# Patient Record
Sex: MALE | Race: WHITE | NOT HISPANIC OR LATINO | Employment: OTHER | RURAL
[De-identification: names, ages, dates, MRNs, and addresses within clinical notes are randomized per-mention and may not be internally consistent; named-entity substitution may affect disease eponyms.]

---

## 2021-02-09 LAB — GLUCOSE SERPL-MCNC: 98 MG/DL

## 2021-05-20 RX ORDER — ATENOLOL 25 MG/1
25 TABLET ORAL DAILY
COMMUNITY
End: 2021-07-07

## 2021-05-20 RX ORDER — TRAZODONE HYDROCHLORIDE 150 MG/1
150 TABLET ORAL NIGHTLY
COMMUNITY
End: 2021-08-10 | Stop reason: SDUPTHER

## 2021-05-20 RX ORDER — HYDROCODONE BITARTRATE AND ACETAMINOPHEN 10; 325 MG/1; MG/1
1 TABLET ORAL EVERY 6 HOURS
COMMUNITY
End: 2021-11-11 | Stop reason: SDUPTHER

## 2021-05-20 RX ORDER — LOSARTAN POTASSIUM AND HYDROCHLOROTHIAZIDE 12.5; 5 MG/1; MG/1
1 TABLET ORAL DAILY
COMMUNITY
End: 2021-08-09

## 2021-05-20 RX ORDER — ESCITALOPRAM OXALATE 20 MG/1
20 TABLET ORAL DAILY
COMMUNITY
End: 2021-07-07

## 2021-05-20 RX ORDER — CYCLOBENZAPRINE HCL 10 MG
10 TABLET ORAL EVERY 8 HOURS
COMMUNITY
End: 2021-07-07

## 2021-05-24 ENCOUNTER — OFFICE VISIT (OUTPATIENT)
Dept: PAIN MEDICINE | Facility: CLINIC | Age: 66
End: 2021-05-24
Payer: MEDICARE

## 2021-05-24 VITALS
BODY MASS INDEX: 34.27 KG/M2 | RESPIRATION RATE: 18 BRPM | SYSTOLIC BLOOD PRESSURE: 127 MMHG | WEIGHT: 267 LBS | HEIGHT: 74 IN | HEART RATE: 68 BPM | DIASTOLIC BLOOD PRESSURE: 78 MMHG

## 2021-05-24 DIAGNOSIS — R10.84 GENERALIZED ABDOMINAL PAIN: Primary | Chronic | ICD-10-CM

## 2021-05-24 DIAGNOSIS — Z79.899 ENCOUNTER FOR LONG-TERM (CURRENT) USE OF OTHER MEDICATIONS: ICD-10-CM

## 2021-05-24 DIAGNOSIS — M79.10 MYALGIA: Chronic | ICD-10-CM

## 2021-05-24 DIAGNOSIS — M47.812 CERVICAL SPONDYLOSIS WITHOUT MYELOPATHY: Chronic | ICD-10-CM

## 2021-05-24 LAB
CTP QC/QA: YES
POC (AMP) AMPHETAMINE: NEGATIVE
POC (BAR) BARBITURATES: NEGATIVE
POC (BUP) BUPRENORPHINE: NEGATIVE
POC (BZO) BENZODIAZEPINES: NEGATIVE
POC (COC) COCAINE: NEGATIVE
POC (MDMA) METHYLENEDIOXYMETHAMPHETAMINE 3,4: NEGATIVE
POC (MET) METHAMPHETAMINE: NEGATIVE
POC (MOP) OPIATES: ABNORMAL
POC (MTD) METHADONE: NEGATIVE
POC (OXY) OXYCODONE: NEGATIVE
POC (PCP) PHENCYCLIDINE: NEGATIVE
POC (TCA) TRICYCLIC ANTIDEPRESSANTS: NEGATIVE
POC TEMPERATURE (URINE): 92
POC THC: NEGATIVE

## 2021-05-24 PROCEDURE — 99214 OFFICE O/P EST MOD 30 MIN: CPT | Mod: S$PBB,,, | Performed by: PHYSICIAN ASSISTANT

## 2021-05-24 PROCEDURE — 99214 PR OFFICE/OUTPT VISIT, EST, LEVL IV, 30-39 MIN: ICD-10-PCS | Mod: S$PBB,,, | Performed by: PHYSICIAN ASSISTANT

## 2021-05-24 PROCEDURE — 80305 DRUG TEST PRSMV DIR OPT OBS: CPT | Mod: PBBFAC | Performed by: PHYSICIAN ASSISTANT

## 2021-05-24 PROCEDURE — 99214 OFFICE O/P EST MOD 30 MIN: CPT | Mod: PBBFAC | Performed by: PHYSICIAN ASSISTANT

## 2021-05-24 RX ORDER — HYDROCODONE BITARTRATE AND ACETAMINOPHEN 10; 325 MG/1; MG/1
1 TABLET ORAL EVERY 6 HOURS
Qty: 120 TABLET | Refills: 0 | Status: SHIPPED | OUTPATIENT
Start: 2021-07-27 | End: 2021-08-26

## 2021-05-24 RX ORDER — HYDROCODONE BITARTRATE AND ACETAMINOPHEN 10; 325 MG/1; MG/1
1 TABLET ORAL EVERY 6 HOURS
Qty: 120 TABLET | Refills: 0 | Status: SHIPPED | OUTPATIENT
Start: 2021-05-28 | End: 2021-06-27

## 2021-05-24 RX ORDER — HYDROCODONE BITARTRATE AND ACETAMINOPHEN 10; 325 MG/1; MG/1
1 TABLET ORAL EVERY 6 HOURS
Qty: 120 TABLET | Refills: 0 | Status: SHIPPED | OUTPATIENT
Start: 2021-06-25 | End: 2021-07-25

## 2021-08-10 ENCOUNTER — OFFICE VISIT (OUTPATIENT)
Dept: FAMILY MEDICINE | Facility: CLINIC | Age: 66
End: 2021-08-10
Payer: MEDICARE

## 2021-08-10 VITALS
OXYGEN SATURATION: 99 % | BODY MASS INDEX: 34.27 KG/M2 | HEIGHT: 74 IN | RESPIRATION RATE: 18 BRPM | WEIGHT: 267 LBS | DIASTOLIC BLOOD PRESSURE: 72 MMHG | HEART RATE: 88 BPM | TEMPERATURE: 98 F | SYSTOLIC BLOOD PRESSURE: 124 MMHG

## 2021-08-10 DIAGNOSIS — G47.00 INSOMNIA, UNSPECIFIED TYPE: ICD-10-CM

## 2021-08-10 DIAGNOSIS — M47.812 CERVICAL SPONDYLOSIS WITHOUT MYELOPATHY: ICD-10-CM

## 2021-08-10 DIAGNOSIS — I10 HYPERTENSION, UNSPECIFIED TYPE: Primary | ICD-10-CM

## 2021-08-10 DIAGNOSIS — R45.4 OUTBURSTS OF ANGER: ICD-10-CM

## 2021-08-10 DIAGNOSIS — E78.5 HYPERLIPIDEMIA, UNSPECIFIED HYPERLIPIDEMIA TYPE: ICD-10-CM

## 2021-08-10 PROCEDURE — 99213 OFFICE O/P EST LOW 20 MIN: CPT | Mod: ,,, | Performed by: NURSE PRACTITIONER

## 2021-08-10 PROCEDURE — 99213 PR OFFICE/OUTPT VISIT, EST, LEVL III, 20-29 MIN: ICD-10-PCS | Mod: ,,, | Performed by: NURSE PRACTITIONER

## 2021-08-10 RX ORDER — TRAZODONE HYDROCHLORIDE 150 MG/1
150 TABLET ORAL NIGHTLY
Qty: 90 TABLET | Refills: 1 | Status: SHIPPED | OUTPATIENT
Start: 2021-08-10 | End: 2022-02-10 | Stop reason: SDUPTHER

## 2021-08-10 RX ORDER — CYCLOBENZAPRINE HCL 10 MG
10 TABLET ORAL EVERY 8 HOURS PRN
Qty: 90 TABLET | Refills: 2 | Status: SHIPPED | OUTPATIENT
Start: 2021-08-10 | End: 2021-12-15

## 2021-08-10 RX ORDER — ATENOLOL 25 MG/1
25 TABLET ORAL DAILY
Qty: 90 TABLET | Refills: 1 | Status: SHIPPED | OUTPATIENT
Start: 2021-08-10 | End: 2022-02-10 | Stop reason: SDUPTHER

## 2021-08-10 RX ORDER — LOSARTAN POTASSIUM AND HYDROCHLOROTHIAZIDE 12.5; 5 MG/1; MG/1
1 TABLET ORAL DAILY
Qty: 90 TABLET | Refills: 1 | Status: SHIPPED | OUTPATIENT
Start: 2021-08-10 | End: 2022-02-10 | Stop reason: SDUPTHER

## 2021-08-10 RX ORDER — ESCITALOPRAM OXALATE 20 MG/1
TABLET ORAL
Qty: 90 TABLET | Refills: 1 | Status: SHIPPED | OUTPATIENT
Start: 2021-08-10 | End: 2022-02-10 | Stop reason: SDUPTHER

## 2021-08-10 RX ORDER — LOVASTATIN 40 MG/1
40 TABLET ORAL NIGHTLY
Qty: 90 TABLET | Refills: 1 | Status: SHIPPED | OUTPATIENT
Start: 2021-08-10 | End: 2022-02-10 | Stop reason: SDUPTHER

## 2021-08-25 ENCOUNTER — OFFICE VISIT (OUTPATIENT)
Dept: PAIN MEDICINE | Facility: CLINIC | Age: 66
End: 2021-08-25
Payer: MEDICARE

## 2021-08-25 VITALS
HEART RATE: 71 BPM | WEIGHT: 261.63 LBS | DIASTOLIC BLOOD PRESSURE: 80 MMHG | SYSTOLIC BLOOD PRESSURE: 118 MMHG | HEIGHT: 74 IN | BODY MASS INDEX: 33.58 KG/M2

## 2021-08-25 DIAGNOSIS — Z79.899 ENCOUNTER FOR LONG-TERM (CURRENT) USE OF OTHER MEDICATIONS: Primary | Chronic | ICD-10-CM

## 2021-08-25 DIAGNOSIS — R10.84 GENERALIZED ABDOMINAL PAIN: Chronic | ICD-10-CM

## 2021-08-25 LAB
CTP QC/QA: YES
POC (AMP) AMPHETAMINE: NEGATIVE
POC (BAR) BARBITURATES: NEGATIVE
POC (BUP) BUPRENORPHINE: NEGATIVE
POC (BZO) BENZODIAZEPINES: NEGATIVE
POC (COC) COCAINE: NEGATIVE
POC (MDMA) METHYLENEDIOXYMETHAMPHETAMINE 3,4: NEGATIVE
POC (MET) METHAMPHETAMINE: NEGATIVE
POC (MOP) OPIATES: ABNORMAL
POC (MTD) METHADONE: NEGATIVE
POC (OXY) OXYCODONE: NEGATIVE
POC (PCP) PHENCYCLIDINE: NEGATIVE
POC (TCA) TRICYCLIC ANTIDEPRESSANTS: NEGATIVE
POC TEMPERATURE (URINE): 94
POC THC: NEGATIVE

## 2021-08-25 PROCEDURE — 99214 PR OFFICE/OUTPT VISIT, EST, LEVL IV, 30-39 MIN: ICD-10-PCS | Mod: S$PBB,,, | Performed by: PAIN MEDICINE

## 2021-08-25 PROCEDURE — 99214 OFFICE O/P EST MOD 30 MIN: CPT | Mod: PBBFAC | Performed by: PAIN MEDICINE

## 2021-08-25 PROCEDURE — 99214 OFFICE O/P EST MOD 30 MIN: CPT | Mod: S$PBB,,, | Performed by: PAIN MEDICINE

## 2021-08-25 PROCEDURE — 80305 DRUG TEST PRSMV DIR OPT OBS: CPT | Mod: PBBFAC | Performed by: PAIN MEDICINE

## 2021-08-25 RX ORDER — HYDROCODONE BITARTRATE AND ACETAMINOPHEN 10; 325 MG/1; MG/1
1 TABLET ORAL EVERY 6 HOURS PRN
Qty: 120 TABLET | Refills: 0 | Status: SHIPPED | OUTPATIENT
Start: 2021-08-26 | End: 2021-09-25

## 2021-08-25 RX ORDER — HYDROCODONE BITARTRATE AND ACETAMINOPHEN 10; 325 MG/1; MG/1
1 TABLET ORAL EVERY 6 HOURS PRN
Qty: 120 TABLET | Refills: 0 | Status: SHIPPED | OUTPATIENT
Start: 2021-09-24 | End: 2021-09-20 | Stop reason: SDUPTHER

## 2021-08-25 RX ORDER — HYDROCODONE BITARTRATE AND ACETAMINOPHEN 10; 325 MG/1; MG/1
1 TABLET ORAL EVERY 6 HOURS PRN
Qty: 120 TABLET | Refills: 0 | Status: SHIPPED | OUTPATIENT
Start: 2021-10-25 | End: 2021-09-20 | Stop reason: SDUPTHER

## 2021-09-20 ENCOUNTER — OFFICE VISIT (OUTPATIENT)
Dept: FAMILY MEDICINE | Facility: CLINIC | Age: 66
End: 2021-09-20
Payer: MEDICARE

## 2021-09-20 VITALS
WEIGHT: 262 LBS | SYSTOLIC BLOOD PRESSURE: 124 MMHG | DIASTOLIC BLOOD PRESSURE: 86 MMHG | BODY MASS INDEX: 33.62 KG/M2 | OXYGEN SATURATION: 99 % | HEART RATE: 74 BPM | RESPIRATION RATE: 18 BRPM | TEMPERATURE: 98 F | HEIGHT: 74 IN

## 2021-09-20 DIAGNOSIS — Z00.00 ENCOUNTER FOR SUBSEQUENT ANNUAL WELLNESS VISIT (AWV) IN MEDICARE PATIENT: Primary | ICD-10-CM

## 2021-09-20 DIAGNOSIS — I10 HYPERTENSION, UNSPECIFIED TYPE: ICD-10-CM

## 2021-09-20 DIAGNOSIS — E78.5 HYPERLIPIDEMIA, UNSPECIFIED HYPERLIPIDEMIA TYPE: ICD-10-CM

## 2021-09-20 DIAGNOSIS — Z12.5 ENCOUNTER FOR SCREENING FOR MALIGNANT NEOPLASM OF PROSTATE: ICD-10-CM

## 2021-09-20 PROCEDURE — 80061 LIPID PANEL: CPT | Mod: ,,, | Performed by: CLINICAL MEDICAL LABORATORY

## 2021-09-20 PROCEDURE — G0439 PR MEDICARE ANNUAL WELLNESS SUBSEQUENT VISIT: ICD-10-PCS | Mod: ,,, | Performed by: NURSE PRACTITIONER

## 2021-09-20 PROCEDURE — 80061 LIPID PANEL: ICD-10-PCS | Mod: ,,, | Performed by: CLINICAL MEDICAL LABORATORY

## 2021-09-20 PROCEDURE — G0103 PSA SCREENING: HCPCS | Mod: ,,, | Performed by: CLINICAL MEDICAL LABORATORY

## 2021-09-20 PROCEDURE — G0439 PPPS, SUBSEQ VISIT: HCPCS | Mod: ,,, | Performed by: NURSE PRACTITIONER

## 2021-09-20 PROCEDURE — G0103 PSA, SCREENING: ICD-10-PCS | Mod: ,,, | Performed by: CLINICAL MEDICAL LABORATORY

## 2021-09-21 LAB
CHOLEST SERPL-MCNC: 160 MG/DL (ref 0–200)
CHOLEST/HDLC SERPL: 3 {RATIO}
HDLC SERPL-MCNC: 53 MG/DL (ref 40–60)
LDLC SERPL CALC-MCNC: 87 MG/DL
LDLC/HDLC SERPL: 1.6 {RATIO}
NONHDLC SERPL-MCNC: 107 MG/DL
PSA SERPL-MCNC: 0.35 NG/ML (ref 0–4.1)
TRIGL SERPL-MCNC: 101 MG/DL (ref 35–150)
VLDLC SERPL-MCNC: 20 MG/DL

## 2021-11-15 ENCOUNTER — OFFICE VISIT (OUTPATIENT)
Dept: PAIN MEDICINE | Facility: CLINIC | Age: 66
End: 2021-11-15
Payer: MEDICARE

## 2021-11-15 VITALS
WEIGHT: 257 LBS | HEART RATE: 66 BPM | SYSTOLIC BLOOD PRESSURE: 125 MMHG | RESPIRATION RATE: 18 BRPM | HEIGHT: 74 IN | BODY MASS INDEX: 32.98 KG/M2 | DIASTOLIC BLOOD PRESSURE: 71 MMHG

## 2021-11-15 DIAGNOSIS — R10.84 GENERALIZED ABDOMINAL PAIN: Primary | Chronic | ICD-10-CM

## 2021-11-15 DIAGNOSIS — Z79.899 ENCOUNTER FOR LONG-TERM (CURRENT) USE OF OTHER MEDICATIONS: ICD-10-CM

## 2021-11-15 DIAGNOSIS — M47.812 CERVICAL SPONDYLOSIS WITHOUT MYELOPATHY: Chronic | ICD-10-CM

## 2021-11-15 PROCEDURE — 99214 PR OFFICE/OUTPT VISIT, EST, LEVL IV, 30-39 MIN: ICD-10-PCS | Mod: S$PBB,,, | Performed by: PHYSICIAN ASSISTANT

## 2021-11-15 PROCEDURE — 99214 OFFICE O/P EST MOD 30 MIN: CPT | Mod: PBBFAC | Performed by: PHYSICIAN ASSISTANT

## 2021-11-15 PROCEDURE — 99214 OFFICE O/P EST MOD 30 MIN: CPT | Mod: S$PBB,,, | Performed by: PHYSICIAN ASSISTANT

## 2021-11-15 PROCEDURE — 80305 DRUG TEST PRSMV DIR OPT OBS: CPT | Mod: PBBFAC | Performed by: PHYSICIAN ASSISTANT

## 2021-11-15 RX ORDER — HYDROCODONE BITARTRATE AND ACETAMINOPHEN 10; 325 MG/1; MG/1
1 TABLET ORAL EVERY 6 HOURS
Qty: 120 TABLET | Refills: 0 | Status: SHIPPED | OUTPATIENT
Start: 2022-01-21 | End: 2022-02-15 | Stop reason: SDUPTHER

## 2021-11-15 RX ORDER — HYDROCODONE BITARTRATE AND ACETAMINOPHEN 10; 325 MG/1; MG/1
1 TABLET ORAL EVERY 6 HOURS
Qty: 120 TABLET | Refills: 0 | Status: SHIPPED | OUTPATIENT
Start: 2021-11-24 | End: 2021-12-24

## 2021-11-15 RX ORDER — HYDROCODONE BITARTRATE AND ACETAMINOPHEN 10; 325 MG/1; MG/1
1 TABLET ORAL EVERY 6 HOURS
Qty: 120 TABLET | Refills: 0 | Status: SHIPPED | OUTPATIENT
Start: 2021-12-23 | End: 2022-01-22

## 2021-12-15 DIAGNOSIS — M47.812 CERVICAL SPONDYLOSIS WITHOUT MYELOPATHY: ICD-10-CM

## 2021-12-15 RX ORDER — CYCLOBENZAPRINE HCL 10 MG
TABLET ORAL
Qty: 90 TABLET | Refills: 2 | Status: SHIPPED | OUTPATIENT
Start: 2021-12-15 | End: 2022-02-10 | Stop reason: SDUPTHER

## 2022-02-09 NOTE — PROGRESS NOTES
2/10/2022     SOFIA Fitzpatrick   [facility]  [address]  [place] [address]     PATIENT NAME: Simon Voss  : 1955  DATE: 2/10/22  MRN: [id number]      Billing Provider: SOFIA Fitzpatrick  Level of Service:   Patient PCP Information     Provider PCP Type    SOFIA Fitzpatrick General          Reason for Visit / Chief Complaint: Hypertension (6 month follow up, labs, refills) and Hyperlipidemia       Update PCP  Update Chief Complaint         History of Present Illness / Problem Focused Workflow     Simon Voss presents to the clinic 6 month follow up hypertension hyperlipidemia anxiety/depression    Review of Systems     Review of Systems   Constitutional: Negative for appetite change, fatigue and fever.   Eyes: Negative for visual disturbance.   Respiratory: Negative for cough, chest tightness, shortness of breath and wheezing.    Cardiovascular: Negative for chest pain, palpitations, leg swelling and claudication.   Gastrointestinal: Positive for abdominal pain. Negative for change in bowel habit, nausea, vomiting and change in bowel habit.   Genitourinary: Negative for difficulty urinating and frequency.   Neurological: Negative for weakness and headaches.        Medical / Social / Family History     Past Medical History:   Diagnosis Date    Anxiety     Cervical spondylosis without myelopathy     Chronic pain syndrome     Depression     Generalized abdominal pain     High cholesterol     Hypertension        Past Surgical History:   Procedure Laterality Date    APPENDECTOMY         Social History    reports that he has quit smoking. His smokeless tobacco use includes snuff and chew. He reports previous alcohol use. He reports that he does not use drugs.    Family History  's family history includes Cancer in his mother; Diabetes in his maternal grandmother.    Medications and Allergies     Medications  Outpatient Medications  "Marked as Taking for the 2/10/22 encounter (Office Visit) with SOFIA Fitzpatrick   Medication Sig Dispense Refill    HYDROcodone-acetaminophen (NORCO)  mg per tablet Take 1 tablet by mouth every 6 (six) hours. 120 tablet 0    [DISCONTINUED] atenoloL (TENORMIN) 25 MG tablet Take 1 tablet (25 mg total) by mouth once daily. 90 tablet 1    [DISCONTINUED] cyclobenzaprine (FLEXERIL) 10 MG tablet TAKE ONE TABLET BY MOUTH EVERY 8 HOURS AS NEEDED 90 tablet 2    [DISCONTINUED] EScitalopram oxalate (LEXAPRO) 20 MG tablet Take 1.5 tablets by mouth daily x6 weeks, then decrease back to 1 tablet daily 90 tablet 1    [DISCONTINUED] losartan-hydrochlorothiazide 50-12.5 mg (HYZAAR) 50-12.5 mg per tablet Take 1 tablet by mouth once daily. 90 tablet 1    [DISCONTINUED] lovastatin (MEVACOR) 40 MG tablet Take 1 tablet (40 mg total) by mouth nightly. 90 tablet 1    [DISCONTINUED] traZODone (DESYREL) 150 MG tablet Take 1 tablet (150 mg total) by mouth every evening. 90 tablet 1       Allergies  Review of patient's allergies indicates:  No Known Allergies    Physical Examination     Vitals:    02/10/22 0817   BP: 130/80   BP Location: Right arm   Patient Position: Sitting   BP Method: Medium (Manual)   Pulse: 90   Resp: 18   Temp: 98.8 °F (37.1 °C)   TempSrc: Oral   SpO2: 99%   Weight: 117.5 kg (259 lb)   Height: 6' 2" (1.88 m)      Physical Exam  Vitals and nursing note reviewed.   Constitutional:       Appearance: Normal appearance. He is obese.   HENT:      Right Ear: Tympanic membrane, ear canal and external ear normal.      Left Ear: Tympanic membrane, ear canal and external ear normal.      Nose: Nose normal.      Mouth/Throat:      Mouth: Mucous membranes are moist.   Eyes:      Extraocular Movements: Extraocular movements intact.      Conjunctiva/sclera: Conjunctivae normal.      Pupils: Pupils are equal, round, and reactive to light.   Cardiovascular:      Rate and Rhythm: Normal rate and regular rhythm. "      Pulses: Normal pulses.      Heart sounds: Normal heart sounds.   Pulmonary:      Effort: Pulmonary effort is normal.      Breath sounds: Normal breath sounds.   Musculoskeletal:         General: Normal range of motion.      Cervical back: Normal range of motion.   Skin:     General: Skin is warm and dry.   Neurological:      General: No focal deficit present.      Mental Status: He is alert and oriented to person, place, and time.   Psychiatric:         Mood and Affect: Mood normal.         Behavior: Behavior normal.          CMP  Glucose   Date Value Ref Range Status   02/09/2021 98 mg/dL Final     Lab Results   Component Value Date    CHOL 160 09/20/2021     Lab Results   Component Value Date    HDL 53 09/20/2021     Lab Results   Component Value Date    LDLCALC 87 09/20/2021     Lab Results   Component Value Date    TRIG 101 09/20/2021     Lab Results   Component Value Date    CHOLHDL 3.0 09/20/2021     Lab Results   Component Value Date    PSA 0.352 09/20/2021       Assessment and Plan (including Health Maintenance)      Problem List  Smart Sets  Document Outside HM   :    Plan:   - work on diet, specifically cutting back bread, breaded things, cereal, pasta, potatoes, rice  - try to exercise at least 150min a week divided however you want  - if you can do weight, even very light weight do them  - try not to use to much salt, if any, remember that things that are preserved or frozen often contain large amounts of salt as a preserve      Health Maintenance Due   Topic Date Due    Hepatitis C Screening  Never done    Sign Pain Contract  Never done    Abdominal Aortic Aneurysm Screening  Never done    Influenza Vaccine (1) Never done    Pneumococcal Vaccines (Age 65+) (2 of 2 - PPSV23) 11/10/2021       Problem List Items Addressed This Visit        Neuro    Cervical spondylosis without myelopathy (Chronic)    Relevant Medications    cyclobenzaprine (FLEXERIL) 10 MG tablet      Other Visit Diagnoses      Hypertension, unspecified type    -  Primary    Relevant Medications    atenoloL (TENORMIN) 25 MG tablet    losartan-hydrochlorothiazide 50-12.5 mg (HYZAAR) 50-12.5 mg per tablet    Other Relevant Orders    CBC Auto Differential    Comprehensive Metabolic Panel    Hyperlipidemia, unspecified hyperlipidemia type        Relevant Medications    lovastatin (MEVACOR) 40 MG tablet    Other Relevant Orders    Lipid Panel    CK    Outbursts of anger        Relevant Medications    EScitalopram oxalate (LEXAPRO) 20 MG tablet    Insomnia, unspecified type        Relevant Medications    traZODone (DESYREL) 150 MG tablet        Hypertension, unspecified type  -     CBC Auto Differential; Future; Expected date: 02/10/2022  -     Comprehensive Metabolic Panel; Future; Expected date: 02/10/2022  -     atenoloL (TENORMIN) 25 MG tablet; Take 1 tablet (25 mg total) by mouth once daily.  Dispense: 90 tablet; Refill: 1  -     losartan-hydrochlorothiazide 50-12.5 mg (HYZAAR) 50-12.5 mg per tablet; Take 1 tablet by mouth once daily.  Dispense: 90 tablet; Refill: 1    Hyperlipidemia, unspecified hyperlipidemia type  -     Lipid Panel; Future; Expected date: 02/10/2022  -     CK; Future; Expected date: 02/10/2022  -     lovastatin (MEVACOR) 40 MG tablet; Take 1 tablet (40 mg total) by mouth nightly.  Dispense: 90 tablet; Refill: 1    Cervical spondylosis without myelopathy  -     cyclobenzaprine (FLEXERIL) 10 MG tablet; Take 1 tablet (10 mg total) by mouth every 8 (eight) hours as needed.  Dispense: 90 tablet; Refill: 0    Outbursts of anger  -     EScitalopram oxalate (LEXAPRO) 20 MG tablet; Take 1.5 tablets by mouth daily x6 weeks, then decrease back to 1 tablet daily  Dispense: 90 tablet; Refill: 1    Insomnia, unspecified type  -     traZODone (DESYREL) 150 MG tablet; Take 1 tablet (150 mg total) by mouth every evening.  Dispense: 90 tablet; Refill: 1       Health Maintenance Topics with due status: Not Due       Topic Last Completion  Date    TETANUS VACCINE 11/10/2020    PROSTATE-SPECIFIC ANTIGEN 09/20/2021    Lipid Panel 09/20/2021       Procedures     Future Appointments   Date Time Provider Department Center   2/17/2022 10:15 AM NANDO Wagner [facility]   8/10/2022  8:00 AM SOFIA Fitz[place] [facility] [place]   9/21/2022  8:30 AM AWV NURSE [facility]        Follow up in about 6 months (around 8/10/2022), or if symptoms worsen or fail to improve.     Signature:  SOFIA Fitzpatrick    Date of encounter: 2/10/22

## 2022-02-10 ENCOUNTER — OFFICE VISIT (OUTPATIENT)
Dept: FAMILY MEDICINE | Facility: CLINIC | Age: 67
End: 2022-02-10
Payer: MEDICARE

## 2022-02-10 VITALS
HEIGHT: 74 IN | DIASTOLIC BLOOD PRESSURE: 80 MMHG | HEART RATE: 90 BPM | RESPIRATION RATE: 18 BRPM | TEMPERATURE: 99 F | OXYGEN SATURATION: 99 % | BODY MASS INDEX: 33.24 KG/M2 | WEIGHT: 259 LBS | SYSTOLIC BLOOD PRESSURE: 130 MMHG

## 2022-02-10 DIAGNOSIS — R73.01 ELEVATED FASTING GLUCOSE: ICD-10-CM

## 2022-02-10 DIAGNOSIS — G47.00 INSOMNIA, UNSPECIFIED TYPE: ICD-10-CM

## 2022-02-10 DIAGNOSIS — M47.812 CERVICAL SPONDYLOSIS WITHOUT MYELOPATHY: ICD-10-CM

## 2022-02-10 DIAGNOSIS — E78.5 HYPERLIPIDEMIA, UNSPECIFIED HYPERLIPIDEMIA TYPE: ICD-10-CM

## 2022-02-10 DIAGNOSIS — R45.4 OUTBURSTS OF ANGER: ICD-10-CM

## 2022-02-10 DIAGNOSIS — I10 HYPERTENSION, UNSPECIFIED TYPE: Primary | ICD-10-CM

## 2022-02-10 LAB
ALBUMIN SERPL BCP-MCNC: 3.8 G/DL (ref 3.5–5)
ALBUMIN/GLOB SERPL: 1.2 {RATIO}
ALP SERPL-CCNC: 46 U/L (ref 45–115)
ALT SERPL W P-5'-P-CCNC: 30 U/L (ref 16–61)
ANION GAP SERPL CALCULATED.3IONS-SCNC: 8 MMOL/L (ref 7–16)
AST SERPL W P-5'-P-CCNC: 19 U/L (ref 15–37)
BASOPHILS # BLD AUTO: 0.11 K/UL (ref 0–0.2)
BASOPHILS NFR BLD AUTO: 1.2 % (ref 0–1)
BILIRUB SERPL-MCNC: 0.5 MG/DL (ref 0–1.2)
BUN SERPL-MCNC: 20 MG/DL (ref 7–18)
BUN/CREAT SERPL: 14 (ref 6–20)
CALCIUM SERPL-MCNC: 8.7 MG/DL (ref 8.5–10.1)
CHLORIDE SERPL-SCNC: 104 MMOL/L (ref 98–107)
CHOLEST SERPL-MCNC: 156 MG/DL (ref 0–200)
CHOLEST/HDLC SERPL: 3.2 {RATIO}
CK SERPL-CCNC: 248 U/L (ref 39–308)
CO2 SERPL-SCNC: 29 MMOL/L (ref 21–32)
CREAT SERPL-MCNC: 1.4 MG/DL (ref 0.7–1.3)
DIFFERENTIAL METHOD BLD: ABNORMAL
EOSINOPHIL # BLD AUTO: 0.4 K/UL (ref 0–0.5)
EOSINOPHIL NFR BLD AUTO: 4.5 % (ref 1–4)
ERYTHROCYTE [DISTWIDTH] IN BLOOD BY AUTOMATED COUNT: 12.4 % (ref 11.5–14.5)
GLOBULIN SER-MCNC: 3.3 G/DL (ref 2–4)
GLUCOSE SERPL-MCNC: 116 MG/DL (ref 74–106)
HCT VFR BLD AUTO: 48.1 % (ref 40–54)
HDLC SERPL-MCNC: 49 MG/DL (ref 40–60)
HGB BLD-MCNC: 15.9 G/DL (ref 13.5–18)
IMM GRANULOCYTES # BLD AUTO: 0.06 K/UL (ref 0–0.04)
IMM GRANULOCYTES NFR BLD: 0.7 % (ref 0–0.4)
LDLC SERPL CALC-MCNC: 90 MG/DL
LDLC/HDLC SERPL: 1.8 {RATIO}
LYMPHOCYTES # BLD AUTO: 2.33 K/UL (ref 1–4.8)
LYMPHOCYTES NFR BLD AUTO: 26.2 % (ref 27–41)
MCH RBC QN AUTO: 30.1 PG (ref 27–31)
MCHC RBC AUTO-ENTMCNC: 33.1 G/DL (ref 32–36)
MCV RBC AUTO: 91.1 FL (ref 80–96)
MONOCYTES # BLD AUTO: 0.78 K/UL (ref 0–0.8)
MONOCYTES NFR BLD AUTO: 8.8 % (ref 2–6)
MPC BLD CALC-MCNC: 10.8 FL (ref 9.4–12.4)
NEUTROPHILS # BLD AUTO: 5.21 K/UL (ref 1.8–7.7)
NEUTROPHILS NFR BLD AUTO: 58.6 % (ref 53–65)
NONHDLC SERPL-MCNC: 107 MG/DL
NRBC # BLD AUTO: 0 X10E3/UL
NRBC, AUTO (.00): 0 %
PLATELET # BLD AUTO: 197 K/UL (ref 150–400)
POTASSIUM SERPL-SCNC: 4.1 MMOL/L (ref 3.5–5.1)
PROT SERPL-MCNC: 7.1 G/DL (ref 6.4–8.2)
RBC # BLD AUTO: 5.28 M/UL (ref 4.6–6.2)
SODIUM SERPL-SCNC: 137 MMOL/L (ref 136–145)
TRIGL SERPL-MCNC: 86 MG/DL (ref 35–150)
VLDLC SERPL-MCNC: 17 MG/DL
WBC # BLD AUTO: 8.89 K/UL (ref 4.5–11)

## 2022-02-10 PROCEDURE — 85025 COMPLETE CBC W/AUTO DIFF WBC: CPT | Mod: ,,, | Performed by: CLINICAL MEDICAL LABORATORY

## 2022-02-10 PROCEDURE — 99214 PR OFFICE/OUTPT VISIT, EST, LEVL IV, 30-39 MIN: ICD-10-PCS | Mod: ,,, | Performed by: NURSE PRACTITIONER

## 2022-02-10 PROCEDURE — 85025 CBC WITH DIFFERENTIAL: ICD-10-PCS | Mod: ,,, | Performed by: CLINICAL MEDICAL LABORATORY

## 2022-02-10 PROCEDURE — 80061 LIPID PANEL: CPT | Mod: ,,, | Performed by: CLINICAL MEDICAL LABORATORY

## 2022-02-10 PROCEDURE — 82550 CK: ICD-10-PCS | Mod: ,,, | Performed by: CLINICAL MEDICAL LABORATORY

## 2022-02-10 PROCEDURE — 80053 COMPREHENSIVE METABOLIC PANEL: ICD-10-PCS | Mod: ,,, | Performed by: CLINICAL MEDICAL LABORATORY

## 2022-02-10 PROCEDURE — 82550 ASSAY OF CK (CPK): CPT | Mod: ,,, | Performed by: CLINICAL MEDICAL LABORATORY

## 2022-02-10 PROCEDURE — 80053 COMPREHEN METABOLIC PANEL: CPT | Mod: ,,, | Performed by: CLINICAL MEDICAL LABORATORY

## 2022-02-10 PROCEDURE — 99214 OFFICE O/P EST MOD 30 MIN: CPT | Mod: ,,, | Performed by: NURSE PRACTITIONER

## 2022-02-10 PROCEDURE — 80061 LIPID PANEL: ICD-10-PCS | Mod: ,,, | Performed by: CLINICAL MEDICAL LABORATORY

## 2022-02-10 RX ORDER — ESCITALOPRAM OXALATE 20 MG/1
TABLET ORAL
Qty: 90 TABLET | Refills: 1 | Status: SHIPPED | OUTPATIENT
Start: 2022-02-10 | End: 2022-08-10 | Stop reason: SDUPTHER

## 2022-02-10 RX ORDER — ATENOLOL 25 MG/1
25 TABLET ORAL DAILY
Qty: 90 TABLET | Refills: 1 | Status: SHIPPED | OUTPATIENT
Start: 2022-02-10 | End: 2022-08-10 | Stop reason: SDUPTHER

## 2022-02-10 RX ORDER — TRAZODONE HYDROCHLORIDE 150 MG/1
150 TABLET ORAL NIGHTLY
Qty: 90 TABLET | Refills: 1 | Status: SHIPPED | OUTPATIENT
Start: 2022-02-10 | End: 2022-08-10 | Stop reason: SDUPTHER

## 2022-02-10 RX ORDER — LOSARTAN POTASSIUM AND HYDROCHLOROTHIAZIDE 12.5; 5 MG/1; MG/1
1 TABLET ORAL DAILY
Qty: 90 TABLET | Refills: 1 | Status: SHIPPED | OUTPATIENT
Start: 2022-02-10 | End: 2022-08-10 | Stop reason: SDUPTHER

## 2022-02-10 RX ORDER — LOVASTATIN 40 MG/1
40 TABLET ORAL NIGHTLY
Qty: 90 TABLET | Refills: 1 | Status: SHIPPED | OUTPATIENT
Start: 2022-02-10 | End: 2023-01-10 | Stop reason: SDUPTHER

## 2022-02-10 RX ORDER — CYCLOBENZAPRINE HCL 10 MG
10 TABLET ORAL EVERY 8 HOURS PRN
Qty: 90 TABLET | Refills: 0 | Status: SHIPPED | OUTPATIENT
Start: 2022-02-10 | End: 2022-04-26

## 2022-02-10 NOTE — PATIENT INSTRUCTIONS
Get labs prior to visit in 6 months work on diet, specifically cutting back bread, breaded things, cereal, pasta, potatoes, rice  - try to exercise at least 150min a week divided however you want  - if you can do weight, even very light weight do them  - try not to use to much salt, if any, remember that things that are preserved or frozen often contain large amounts of salt as a preserve

## 2022-02-11 PROCEDURE — 83036 HEMOGLOBIN GLYCOSYLATED A1C: CPT | Mod: ,,, | Performed by: CLINICAL MEDICAL LABORATORY

## 2022-02-11 PROCEDURE — 83036 HEMOGLOBIN A1C: ICD-10-PCS | Mod: ,,, | Performed by: CLINICAL MEDICAL LABORATORY

## 2022-02-11 NOTE — PROGRESS NOTES
Fasting glucose elevated and bun cr up, avoid nsaids keep blood pressure and glucose controlled, add A1C

## 2022-02-12 LAB
EST. AVERAGE GLUCOSE BLD GHB EST-MCNC: 107 MG/DL
HBA1C MFR BLD HPLC: 5.8 % (ref 4.5–6.6)

## 2022-02-14 NOTE — PROGRESS NOTES
Glucose elevated watch diet prediabetic.  - work on diet, specifically cutting back bread, breaded things, cereal, pasta, potatoes, rice  - try to exercise at least 150min a week divided however you want  - if you can do weight, even very light weight do them  - try not to use to much salt, if any, remember that things that are preserved or frozen often contain large amounts of salt as a preserve

## 2022-02-15 NOTE — PROGRESS NOTES
Disclaimer:  This note has been generated using voice recognition software.  There may be type of graft focal areas that have been missed during a proof reading      Subjective:      Patient ID: Simon Voss is a 66 y.o. male.    Chief Complaint: Abdominal Pain      Abdominal Pain  This is a chronic problem. The current episode started more than 1 year ago. The problem occurs daily. The problem has been waxing and waning. Associated symptoms include arthralgias. Pertinent negatives include no diarrhea, dysuria, fever, frequency or vomiting.   Muscle Pain  Associated symptoms include abdominal pain and arthralgias. Pertinent negatives include no change in bowel habit, chest pain, chills, coughing, diaphoresis, fever, rash, sore throat, vertigo or vomiting.     Review of Systems   Constitutional: Negative for appetite change, chills, diaphoresis, fever and unexpected weight change.   HENT: Negative for drooling, ear discharge, ear pain, facial swelling, nosebleeds, sore throat, trouble swallowing, voice change and goiter.    Eyes: Negative for photophobia, pain, discharge, redness and visual disturbance.   Respiratory: Negative for apnea, cough, choking, chest tightness, shortness of breath, wheezing and stridor.    Cardiovascular: Negative for chest pain, palpitations and leg swelling.   Gastrointestinal: Positive for abdominal pain. Negative for abdominal distention, change in bowel habit, diarrhea, rectal pain, vomiting, fecal incontinence and change in bowel habit.   Endocrine: Negative for cold intolerance, heat intolerance, polydipsia, polyphagia and polyuria.   Genitourinary: Negative for bladder incontinence, dysuria, flank pain and frequency.   Musculoskeletal: Positive for arthralgias, back pain, leg pain and neck stiffness.   Integumentary:  Negative for color change, pallor and rash.   Neurological: Negative for dizziness, vertigo, seizures, syncope, facial asymmetry, speech difficulty,  "light-headedness, disturbances in coordination, memory loss and coordination difficulties.   Hematological: Negative for adenopathy. Does not bruise/bleed easily.   Psychiatric/Behavioral: Negative for agitation, behavioral problems, confusion, decreased concentration, dysphoric mood, hallucinations, self-injury and suicidal ideas. The patient is not nervous/anxious and is not hyperactive.             Objective:  Vitals:    02/17/22 1036   BP: 119/86   Pulse: 78   Resp: 19   Weight: 118.8 kg (262 lb)   Height: 6' 2" (1.88 m)   PainSc:   7         Physical Exam  Vitals and nursing note reviewed. Exam conducted with a chaperone present.   Constitutional:       General: He is awake.      Appearance: Normal appearance. He is not diaphoretic.   HENT:      Head: Normocephalic and atraumatic.      Nose: Nose normal.      Mouth/Throat:      Mouth: Mucous membranes are moist.      Pharynx: Oropharynx is clear.   Eyes:      Conjunctiva/sclera: Conjunctivae normal.      Pupils: Pupils are equal, round, and reactive to light.   Cardiovascular:      Rate and Rhythm: Normal rate.   Pulmonary:      Effort: Pulmonary effort is normal. No respiratory distress.   Abdominal:      Palpations: Abdomen is soft.      Tenderness: There is abdominal tenderness.   Musculoskeletal:      Cervical back: Normal range of motion and neck supple.      Thoracic back: Tenderness present.      Lumbar back: Tenderness present. Decreased range of motion.   Skin:     General: Skin is warm and dry.   Neurological:      General: No focal deficit present.      Mental Status: He is alert and oriented to person, place, and time. Mental status is at baseline.      Cranial Nerves: Cranial nerves are intact. No cranial nerve deficit (II-XII).   Psychiatric:         Mood and Affect: Mood normal.         Behavior: Behavior normal. Behavior is cooperative.         Thought Content: Thought content normal.           No image results found.       Office Visit on " 02/10/2022   Component Date Value Ref Range Status    Sodium 02/10/2022 137  136 - 145 mmol/L Final    Potassium 02/10/2022 4.1  3.5 - 5.1 mmol/L Final    Chloride 02/10/2022 104  98 - 107 mmol/L Final    CO2 02/10/2022 29  21 - 32 mmol/L Final    Anion Gap 02/10/2022 8  7 - 16 mmol/L Final    Glucose 02/10/2022 116* 74 - 106 mg/dL Final    BUN 02/10/2022 20* 7 - 18 mg/dL Final    Creatinine 02/10/2022 1.40* 0.70 - 1.30 mg/dL Final    BUN/Creatinine Ratio 02/10/2022 14  6 - 20 Final    Calcium 02/10/2022 8.7  8.5 - 10.1 mg/dL Final    Total Protein 02/10/2022 7.1  6.4 - 8.2 g/dL Final    Albumin 02/10/2022 3.8  3.5 - 5.0 g/dL Final    Globulin 02/10/2022 3.3  2.0 - 4.0 g/dL Final    A/G Ratio 02/10/2022 1.2   Final    Bilirubin, Total 02/10/2022 0.5  0.0 - 1.2 mg/dL Final    Alk Phos 02/10/2022 46  45 - 115 U/L Final    ALT 02/10/2022 30  16 - 61 U/L Final    AST 02/10/2022 19  15 - 37 U/L Final    eGFR 02/10/2022 54* >=60 mL/min/1.73m² Final    Triglycerides 02/10/2022 86  35 - 150 mg/dL Final    Cholesterol 02/10/2022 156  0 - 200 mg/dL Final    HDL Cholesterol 02/10/2022 49  40 - 60 mg/dL Final    Cholesterol/HDL Ratio (Risk Factor) 02/10/2022 3.2   Final    Non-HDL 02/10/2022 107  mg/dL Final    LDL Calculated 02/10/2022 90  mg/dL Final    LDL/HDL 02/10/2022 1.8   Final    VLDL 02/10/2022 17  mg/dL Final    CK 02/10/2022 248  39 - 308 U/L Final    WBC 02/10/2022 8.89  4.50 - 11.00 K/uL Final    RBC 02/10/2022 5.28  4.60 - 6.20 M/uL Final    Hemoglobin 02/10/2022 15.9  13.5 - 18.0 g/dL Final    Hematocrit 02/10/2022 48.1  40.0 - 54.0 % Final    MCV 02/10/2022 91.1  80.0 - 96.0 fL Final    MCH 02/10/2022 30.1  27.0 - 31.0 pg Final    MCHC 02/10/2022 33.1  32.0 - 36.0 g/dL Final    RDW 02/10/2022 12.4  11.5 - 14.5 % Final    Platelet Count 02/10/2022 197  150 - 400 K/uL Final    MPV 02/10/2022 10.8  9.4 - 12.4 fL Final    Neutrophils % 02/10/2022 58.6  53.0 - 65.0 % Final     Lymphocytes % 02/10/2022 26.2* 27.0 - 41.0 % Final    Monocytes % 02/10/2022 8.8* 2.0 - 6.0 % Final    Eosinophils % 02/10/2022 4.5* 1.0 - 4.0 % Final    Basophils % 02/10/2022 1.2* 0.0 - 1.0 % Final    Immature Granulocytes % 02/10/2022 0.7* 0.0 - 0.4 % Final    nRBC, Auto 02/10/2022 0.0  <=0.0 % Final    Neutrophils, Abs 02/10/2022 5.21  1.80 - 7.70 K/uL Final    Lymphocytes, Absolute 02/10/2022 2.33  1.00 - 4.80 K/uL Final    Monocytes, Absolute 02/10/2022 0.78  0.00 - 0.80 K/uL Final    Eosinophils, Absolute 02/10/2022 0.40  0.00 - 0.50 K/uL Final    Basophils, Absolute 02/10/2022 0.11  0.00 - 0.20 K/uL Final    Immature Granulocytes, Absolute 02/10/2022 0.06* 0.00 - 0.04 K/uL Final    nRBC, Absolute 02/10/2022 0.00  <=0.00 x10e3/uL Final    Diff Type 02/10/2022 Auto   Final    Hemoglobin A1C 02/11/2022 5.8  4.5 - 6.6 % Final    Estimated Average Glucose 02/11/2022 107  mg/dL Final   Office Visit on 11/15/2021   Component Date Value Ref Range Status    POC Amphetamines 11/15/2021 Negative  Negative, Inconclusive Final    POC Barbiturates 11/15/2021 Negative  Negative, Inconclusive Final    POC Benzodiazepines 11/15/2021 Negative  Negative, Inconclusive Final    POC Cocaine 11/15/2021 Negative  Negative, Inconclusive Final    POC THC 11/15/2021 Negative  Negative, Inconclusive Final    POC Methadone 11/15/2021 Negative  Negative, Inconclusive Final    POC Methamphetamine 11/15/2021 Negative  Negative, Inconclusive Final    POC Opiates 11/15/2021 Presumptive Positive* Negative, Inconclusive Final    POC Oxycodone 11/15/2021 Negative  Negative, Inconclusive Final    POC Phencyclidine 11/15/2021 Negative  Negative, Inconclusive Final    POC Methylenedioxymethamphetamine * 11/15/2021 Negative  Negative, Inconclusive Final    POC Tricyclic Antidepressants 11/15/2021 Negative  Negative, Inconclusive Final    POC Buprenorphine 11/15/2021 Negative   Final     Acceptable  11/15/2021 Yes   Final    POC Temperature (Urine) 11/15/2021 94   Final   Office Visit on 09/20/2021   Component Date Value Ref Range Status    Glucose 02/09/2021 98  mg/dL Final    PSA Total 09/20/2021 0.352  0.000 - 4.100 ng/mL Final    Triglycerides 09/20/2021 101  35 - 150 mg/dL Final    Cholesterol 09/20/2021 160  0 - 200 mg/dL Final    HDL Cholesterol 09/20/2021 53  40 - 60 mg/dL Final    Cholesterol/HDL Ratio (Risk Factor) 09/20/2021 3.0   Final    Non-HDL 09/20/2021 107  mg/dL Final    LDL Calculated 09/20/2021 87  mg/dL Final    LDL/HDL 09/20/2021 1.6   Final    VLDL 09/20/2021 20  mg/dL Final   Office Visit on 08/25/2021   Component Date Value Ref Range Status    POC Amphetamines 08/25/2021 Negative  Negative, Inconclusive Final    POC Barbiturates 08/25/2021 Negative  Negative, Inconclusive Final    POC Benzodiazepines 08/25/2021 Negative  Negative, Inconclusive Final    POC Cocaine 08/25/2021 Negative  Negative, Inconclusive Final    POC THC 08/25/2021 Negative  Negative, Inconclusive Final    POC Methadone 08/25/2021 Negative  Negative, Inconclusive Final    POC Methamphetamine 08/25/2021 Negative  Negative, Inconclusive Final    POC Opiates 08/25/2021 Presumptive Positive* Negative, Inconclusive Final    POC Oxycodone 08/25/2021 Negative* Negative, Inconclusive Final    POC Phencyclidine 08/25/2021 Negative  Negative, Inconclusive Final    POC Methylenedioxymethamphetamine * 08/25/2021 Negative  Negative, Inconclusive Final    POC Tricyclic Antidepressants 08/25/2021 Negative  Negative, Inconclusive Final    POC Buprenorphine 08/25/2021 Negative   Final     Acceptable 08/25/2021 Yes   Final    POC Temperature (Urine) 08/25/2021 94   Final           Assessment:      1. Generalized abdominal pain    2. Cervical spondylosis without myelopathy    3. Encounter for long-term (current) use of other medications          Orders Placed This Encounter   Procedures    POCT  Urine Drug Screen Presump     Interpretive Information:     Negative:  No drug detected at the cut off level.   Positive:  This result represents presumptive positive for the   tested drug, other substances may yield a positive response other   than the analyte of interest. This result should be utilized for   diagnostic purpose only. Confirmation testing will be performed upon physician request only.            A's of Opioid Risk Assessment  Activity:Patient can perform ADL.   Analgesia:Patients pain is partially controlled by current medication. Patient has tried OTC medications such as Tylenol and Ibuprofen with out relief.   Adverse Effects: Patient denies constipation or sedation.  Aberrant Behavior:  reviewed with no aberrant drug seeking/taking behavior.  Overdose reversal drug naloxone discussed    Drug screen reviewed      Requested Prescriptions     Signed Prescriptions Disp Refills    HYDROcodone-acetaminophen (NORCO)  mg per tablet 120 tablet 0     Sig: Take 1 tablet by mouth every 6 (six) hours.    HYDROcodone-acetaminophen (NORCO)  mg per tablet 120 tablet 0     Sig: Take 1 tablet by mouth every 6 (six) hours.    HYDROcodone-acetaminophen (NORCO)  mg per tablet 120 tablet 0     Sig: Take 1 tablet by mouth every 6 (six) hours.         Plan:    He states he would like to continue with conservative management    He states current medications helping control his discomfort    No new complaints    Continue home exercise program as directed    Continue current medication    Follow-up 3 months    Dr. Kong, September 2022    Bring original prescription medication bottles/container/box with labels to each visit

## 2022-02-17 ENCOUNTER — OFFICE VISIT (OUTPATIENT)
Dept: PAIN MEDICINE | Facility: CLINIC | Age: 67
End: 2022-02-17
Payer: MEDICARE

## 2022-02-17 VITALS
DIASTOLIC BLOOD PRESSURE: 86 MMHG | BODY MASS INDEX: 33.62 KG/M2 | SYSTOLIC BLOOD PRESSURE: 119 MMHG | HEIGHT: 74 IN | RESPIRATION RATE: 19 BRPM | HEART RATE: 78 BPM | WEIGHT: 262 LBS

## 2022-02-17 DIAGNOSIS — R10.84 GENERALIZED ABDOMINAL PAIN: Primary | Chronic | ICD-10-CM

## 2022-02-17 DIAGNOSIS — M47.812 CERVICAL SPONDYLOSIS WITHOUT MYELOPATHY: Chronic | ICD-10-CM

## 2022-02-17 DIAGNOSIS — Z79.899 ENCOUNTER FOR LONG-TERM (CURRENT) USE OF OTHER MEDICATIONS: ICD-10-CM

## 2022-02-17 PROCEDURE — 99214 OFFICE O/P EST MOD 30 MIN: CPT | Mod: PBBFAC | Performed by: PHYSICIAN ASSISTANT

## 2022-02-17 PROCEDURE — 80305 DRUG TEST PRSMV DIR OPT OBS: CPT | Mod: PBBFAC | Performed by: PHYSICIAN ASSISTANT

## 2022-02-17 PROCEDURE — 99214 PR OFFICE/OUTPT VISIT, EST, LEVL IV, 30-39 MIN: ICD-10-PCS | Mod: S$PBB,,, | Performed by: PHYSICIAN ASSISTANT

## 2022-02-17 PROCEDURE — 99214 OFFICE O/P EST MOD 30 MIN: CPT | Mod: S$PBB,,, | Performed by: PHYSICIAN ASSISTANT

## 2022-02-17 RX ORDER — HYDROCODONE BITARTRATE AND ACETAMINOPHEN 10; 325 MG/1; MG/1
1 TABLET ORAL EVERY 6 HOURS
Qty: 120 TABLET | Refills: 0 | Status: SHIPPED | OUTPATIENT
Start: 2022-04-21 | End: 2022-05-17 | Stop reason: SDUPTHER

## 2022-02-17 RX ORDER — HYDROCODONE BITARTRATE AND ACETAMINOPHEN 10; 325 MG/1; MG/1
1 TABLET ORAL EVERY 6 HOURS
Qty: 120 TABLET | Refills: 0 | Status: SHIPPED | OUTPATIENT
Start: 2022-03-22 | End: 2022-04-21

## 2022-02-17 RX ORDER — HYDROCODONE BITARTRATE AND ACETAMINOPHEN 10; 325 MG/1; MG/1
1 TABLET ORAL EVERY 6 HOURS
Qty: 120 TABLET | Refills: 0 | Status: SHIPPED | OUTPATIENT
Start: 2022-02-18 | End: 2022-03-20

## 2022-02-17 NOTE — PATIENT INSTRUCTIONS
Patient Education       Abdominal Pain, Adult ED   General Information   You came to the Emergency Department (ED) for abdominal or belly pain. The doctor feels that the risk of a serious cause for your belly pain is low.  Many things can cause belly pain. Some are serious things like bleeding or an infection. Less serious things, like an upset stomach, can also cause belly pain.  The doctors may not be able to find all serious causes of belly pain the first time they see you. It is important that you follow up with your doctor. You may be waiting on some test results. The staff will notify you if there are concerning results.  What care is needed at home?   · Call your regular doctor to let them know you were in the ED. Make a follow-up appointment if you were told to.  · Keep a diary about your pain to help your doctor learn more about the cause. Write down the foods you eat to see if they may be the cause of your pain. Also write down what you were doing before and during the pain.  · Eat small meals more often. Eat more fiber if hard stools are a problem.  · Avoid foods or drinks that make your pain worse. Some people are bothered by:  ? Drinks that are fizzy or have caffeine.  ? Fried, greasy, or fatty foods.  ? Orange juice.  ? Milk or cheese can bother some peoples stomach as well.  · When you have pain, you can:  ? Try to have a bowel movement.  ? Lie down and rest.  ? Avoid solid foods for a few hours. If you are hungry, try liquids like broth or water. When you feel better, try mild foods like rice, crackers, bananas, applesauce, or toast.  · Dont take over-the-counter medicines, such as antacids or laxatives, unless they are ordered.  · Check with the doctor before you take any herbal medicines or supplements.  When do I need to get emergency help?   · Call for an ambulance right away if:   ? You have sudden severe belly pain, or the pain is constant.  ? You have trouble breathing or chest pain along  with your belly pain.  ? You start throwing up blood or pass a lot of blood in your stool.  ? Your belly becomes very hard or swollen.  ? You get a fever 102.2°F (39°C) or higher or shaking chills.  · Return to the ED if:   ? You have signs of severe fluid loss, such as:  § No urine for more than 8 hours.  § You feel very light-headed or like you are going to pass out.  § You feel weak, like you are going to fall.  ? Your pain gets worse, comes more often or moves to one area of the belly  ? You have an upset stomach or throwing up that isnt getting better and are having trouble keeping down food and drink.  ? Your stools are black or tar colored.  When do I need to call the doctor?   · If the pain is not gone or getting better in 1 to 2 days.  · You have a fever of 100.4°F (38°C) or higher, chills.  · You develop early signs of fluid loss, such as:  ? Your urine is very dark colored.  ? Your mouth is dry.  ? You have muscle cramps.  ? You have a lack of energy.  ? You feel light-headed when you get up.  · You have pain with passing urine or have blood in your urine.  · Your stools have a small amount (less than 1 teaspoon or 5 mL) of blood in them.  · You feel that something is not right in your belly.  · You have new or worsening symptoms.  Last Reviewed Date   2021-05-07  Consumer Information Use and Disclaimer   This information is not specific medical advice and does not replace information you receive from your health care provider. This is only a brief summary of general information. It does NOT include all information about conditions, illnesses, injuries, tests, procedures, treatments, therapies, discharge instructions or life-style choices that may apply to you. You must talk with your health care provider for complete information about your health and treatment options. This information should not be used to decide whether or not to accept your health care providers advice, instructions or  recommendations. Only your health care provider has the knowledge and training to provide advice that is right for you.  Copyright   Copyright © 2021 [employer] and its affiliates and/or licensors. All rights reserved.

## 2022-03-11 DIAGNOSIS — Z71.89 COMPLEX CARE COORDINATION: ICD-10-CM

## 2022-05-17 ENCOUNTER — OFFICE VISIT (OUTPATIENT)
Dept: PAIN MEDICINE | Facility: CLINIC | Age: 67
End: 2022-05-17
Payer: MEDICARE

## 2022-05-17 VITALS
DIASTOLIC BLOOD PRESSURE: 81 MMHG | HEART RATE: 77 BPM | BODY MASS INDEX: 33.62 KG/M2 | SYSTOLIC BLOOD PRESSURE: 138 MMHG | WEIGHT: 262 LBS | HEIGHT: 74 IN | RESPIRATION RATE: 18 BRPM

## 2022-05-17 DIAGNOSIS — R10.84 GENERALIZED ABDOMINAL PAIN: Primary | Chronic | ICD-10-CM

## 2022-05-17 DIAGNOSIS — M47.812 CERVICAL SPONDYLOSIS WITHOUT MYELOPATHY: Chronic | ICD-10-CM

## 2022-05-17 DIAGNOSIS — Z79.899 ENCOUNTER FOR LONG-TERM (CURRENT) USE OF OTHER MEDICATIONS: ICD-10-CM

## 2022-05-17 PROCEDURE — 99214 OFFICE O/P EST MOD 30 MIN: CPT | Mod: PBBFAC | Performed by: PHYSICIAN ASSISTANT

## 2022-05-17 PROCEDURE — 80305 DRUG TEST PRSMV DIR OPT OBS: CPT | Mod: PBBFAC | Performed by: PHYSICIAN ASSISTANT

## 2022-05-17 PROCEDURE — 99214 OFFICE O/P EST MOD 30 MIN: CPT | Mod: S$PBB,,, | Performed by: PHYSICIAN ASSISTANT

## 2022-05-17 PROCEDURE — 99214 PR OFFICE/OUTPT VISIT, EST, LEVL IV, 30-39 MIN: ICD-10-PCS | Mod: S$PBB,,, | Performed by: PHYSICIAN ASSISTANT

## 2022-05-17 RX ORDER — HYDROCODONE BITARTRATE AND ACETAMINOPHEN 10; 325 MG/1; MG/1
1 TABLET ORAL EVERY 6 HOURS
Qty: 120 TABLET | Refills: 0 | Status: SHIPPED | OUTPATIENT
Start: 2022-06-20 | End: 2022-07-20

## 2022-05-17 RX ORDER — HYDROCODONE BITARTRATE AND ACETAMINOPHEN 10; 325 MG/1; MG/1
1 TABLET ORAL EVERY 6 HOURS
Qty: 120 TABLET | Refills: 0 | Status: SHIPPED | OUTPATIENT
Start: 2022-07-20 | End: 2022-08-16 | Stop reason: SDUPTHER

## 2022-05-17 RX ORDER — HYDROCODONE BITARTRATE AND ACETAMINOPHEN 10; 325 MG/1; MG/1
1 TABLET ORAL EVERY 6 HOURS
Qty: 120 TABLET | Refills: 0 | Status: SHIPPED | OUTPATIENT
Start: 2022-05-20 | End: 2022-06-19

## 2022-05-17 NOTE — PROGRESS NOTES
Subjective:      Patient ID: Simon Voss is a 66 y.o. male.    Chief Complaint: Abdominal Pain, Joint Pain, and Neck Pain      Abdominal Pain  This is a chronic problem. The current episode started more than 1 year ago. The problem occurs daily. The problem has been unchanged. Associated symptoms include arthralgias. Pertinent negatives include no diarrhea, dysuria, fever, frequency or vomiting.   Muscle Pain  Associated symptoms include abdominal pain and arthralgias. Pertinent negatives include no change in bowel habit, chest pain, chills, coughing, diaphoresis, fever, rash, sore throat, vertigo or vomiting.     Review of Systems   Constitutional: Negative for activity change, appetite change, chills, diaphoresis and fever.   HENT: Negative for drooling, ear discharge, ear pain, facial swelling, mouth sores, nosebleeds, sore throat, trouble swallowing, voice change and goiter.    Eyes: Negative for photophobia, pain, discharge, redness and visual disturbance.   Respiratory: Negative for apnea, cough, choking, chest tightness, shortness of breath, wheezing and stridor.    Cardiovascular: Negative for chest pain, palpitations and leg swelling.   Gastrointestinal: Positive for abdominal pain. Negative for abdominal distention, change in bowel habit, diarrhea, rectal pain, vomiting, fecal incontinence and change in bowel habit.   Endocrine: Negative for cold intolerance, heat intolerance, polydipsia, polyphagia and polyuria.   Genitourinary: Negative for bladder incontinence, dysuria, flank pain and frequency.   Musculoskeletal: Positive for arthralgias, back pain, leg pain and neck stiffness.   Integumentary:  Negative for color change, pallor and rash.   Neurological: Negative for dizziness, vertigo, seizures, syncope, facial asymmetry, speech difficulty, light-headedness, disturbances in coordination, memory loss and coordination difficulties.   Hematological: Negative for adenopathy. Does not bruise/bleed  "easily.   Psychiatric/Behavioral: Negative for agitation, behavioral problems, confusion, decreased concentration, hallucinations, self-injury and suicidal ideas. The patient is not nervous/anxious and is not hyperactive.             Objective:  Vitals:    05/17/22 0949   BP: 138/81   Pulse: 77   Resp: 18   Weight: 118.8 kg (262 lb)   Height: 6' 2" (1.88 m)   PainSc:   7         Physical Exam  Vitals and nursing note reviewed. Exam conducted with a chaperone present.   Constitutional:       General: He is awake.      Appearance: Normal appearance. He is not diaphoretic.   HENT:      Head: Normocephalic and atraumatic.      Nose: Nose normal.      Mouth/Throat:      Mouth: Mucous membranes are moist.      Pharynx: Oropharynx is clear.   Eyes:      Conjunctiva/sclera: Conjunctivae normal.      Pupils: Pupils are equal, round, and reactive to light.   Cardiovascular:      Rate and Rhythm: Normal rate.   Pulmonary:      Effort: Pulmonary effort is normal. No respiratory distress.   Abdominal:      Palpations: Abdomen is soft.      Tenderness: There is abdominal tenderness.   Musculoskeletal:      Cervical back: Normal range of motion and neck supple.      Thoracic back: Tenderness present.      Lumbar back: Tenderness present. Decreased range of motion.   Skin:     General: Skin is warm and dry.   Neurological:      General: No focal deficit present.      Mental Status: He is alert and oriented to person, place, and time. Mental status is at baseline.      Cranial Nerves: Cranial nerves are intact. No cranial nerve deficit (II-XII).   Psychiatric:         Mood and Affect: Mood normal.         Behavior: Behavior normal. Behavior is cooperative.         Thought Content: Thought content normal.           No image results found.       Office Visit on 02/17/2022   Component Date Value Ref Range Status    POC Amphetamines 02/17/2022 Negative  Negative, Inconclusive Final    POC Barbiturates 02/17/2022 Negative  Negative, " Inconclusive Final    POC Benzodiazepines 02/17/2022 Negative  Negative, Inconclusive Final    POC Cocaine 02/17/2022 Negative  Negative, Inconclusive Final    POC THC 02/17/2022 Negative  Negative, Inconclusive Final    POC Methadone 02/17/2022 Negative  Negative, Inconclusive Final    POC Methamphetamine 02/17/2022 Negative  Negative, Inconclusive Final    POC Opiates 02/17/2022 Presumptive Positive (A) Negative, Inconclusive Final    POC Oxycodone 02/17/2022 Negative  Negative, Inconclusive Final    POC Phencyclidine 02/17/2022 Negative  Negative, Inconclusive Final    POC Methylenedioxymethamphetamine * 02/17/2022 Negative  Negative, Inconclusive Final    POC Tricyclic Antidepressants 02/17/2022 Negative  Negative, Inconclusive Final    POC Buprenorphine 02/17/2022 Negative   Final     Acceptable 02/17/2022 Yes   Final    POC Temperature (Urine) 02/17/2022 94   Final   Office Visit on 02/10/2022   Component Date Value Ref Range Status    Sodium 02/10/2022 137  136 - 145 mmol/L Final    Potassium 02/10/2022 4.1  3.5 - 5.1 mmol/L Final    Chloride 02/10/2022 104  98 - 107 mmol/L Final    CO2 02/10/2022 29  21 - 32 mmol/L Final    Anion Gap 02/10/2022 8  7 - 16 mmol/L Final    Glucose 02/10/2022 116 (A) 74 - 106 mg/dL Final    BUN 02/10/2022 20 (A) 7 - 18 mg/dL Final    Creatinine 02/10/2022 1.40 (A) 0.70 - 1.30 mg/dL Final    BUN/Creatinine Ratio 02/10/2022 14  6 - 20 Final    Calcium 02/10/2022 8.7  8.5 - 10.1 mg/dL Final    Total Protein 02/10/2022 7.1  6.4 - 8.2 g/dL Final    Albumin 02/10/2022 3.8  3.5 - 5.0 g/dL Final    Globulin 02/10/2022 3.3  2.0 - 4.0 g/dL Final    A/G Ratio 02/10/2022 1.2   Final    Bilirubin, Total 02/10/2022 0.5  0.0 - 1.2 mg/dL Final    Alk Phos 02/10/2022 46  45 - 115 U/L Final    ALT 02/10/2022 30  16 - 61 U/L Final    AST 02/10/2022 19  15 - 37 U/L Final    eGFR 02/10/2022 54 (A) >=60 mL/min/1.73m² Final    Triglycerides 02/10/2022 86   35 - 150 mg/dL Final    Cholesterol 02/10/2022 156  0 - 200 mg/dL Final    HDL Cholesterol 02/10/2022 49  40 - 60 mg/dL Final    Cholesterol/HDL Ratio (Risk Factor) 02/10/2022 3.2   Final    Non-HDL 02/10/2022 107  mg/dL Final    LDL Calculated 02/10/2022 90  mg/dL Final    LDL/HDL 02/10/2022 1.8   Final    VLDL 02/10/2022 17  mg/dL Final    CK 02/10/2022 248  39 - 308 U/L Final    WBC 02/10/2022 8.89  4.50 - 11.00 K/uL Final    RBC 02/10/2022 5.28  4.60 - 6.20 M/uL Final    Hemoglobin 02/10/2022 15.9  13.5 - 18.0 g/dL Final    Hematocrit 02/10/2022 48.1  40.0 - 54.0 % Final    MCV 02/10/2022 91.1  80.0 - 96.0 fL Final    MCH 02/10/2022 30.1  27.0 - 31.0 pg Final    MCHC 02/10/2022 33.1  32.0 - 36.0 g/dL Final    RDW 02/10/2022 12.4  11.5 - 14.5 % Final    Platelet Count 02/10/2022 197  150 - 400 K/uL Final    MPV 02/10/2022 10.8  9.4 - 12.4 fL Final    Neutrophils % 02/10/2022 58.6  53.0 - 65.0 % Final    Lymphocytes % 02/10/2022 26.2 (A) 27.0 - 41.0 % Final    Monocytes % 02/10/2022 8.8 (A) 2.0 - 6.0 % Final    Eosinophils % 02/10/2022 4.5 (A) 1.0 - 4.0 % Final    Basophils % 02/10/2022 1.2 (A) 0.0 - 1.0 % Final    Immature Granulocytes % 02/10/2022 0.7 (A) 0.0 - 0.4 % Final    nRBC, Auto 02/10/2022 0.0  <=0.0 % Final    Neutrophils, Abs 02/10/2022 5.21  1.80 - 7.70 K/uL Final    Lymphocytes, Absolute 02/10/2022 2.33  1.00 - 4.80 K/uL Final    Monocytes, Absolute 02/10/2022 0.78  0.00 - 0.80 K/uL Final    Eosinophils, Absolute 02/10/2022 0.40  0.00 - 0.50 K/uL Final    Basophils, Absolute 02/10/2022 0.11  0.00 - 0.20 K/uL Final    Immature Granulocytes, Absolute 02/10/2022 0.06 (A) 0.00 - 0.04 K/uL Final    nRBC, Absolute 02/10/2022 0.00  <=0.00 x10e3/uL Final    Diff Type 02/10/2022 Auto   Final    Hemoglobin A1C 02/11/2022 5.8  4.5 - 6.6 % Final    Estimated Average Glucose 02/11/2022 107  mg/dL Final           Assessment:      1. Generalized abdominal pain    2. Encounter for  long-term (current) use of other medications    3. Cervical spondylosis without myelopathy          Orders Placed This Encounter   Procedures    POCT Urine Drug Screen Presump     Interpretive Information:     Negative:  No drug detected at the cut off level.   Positive:  This result represents presumptive positive for the   tested drug, other substances may yield a positive response other   than the analyte of interest. This result should be utilized for   diagnostic purpose only. Confirmation testing will be performed upon physician request only.            A's of Opioid Risk Assessment  Activity:Patient can perform ADL.   Analgesia:Patients pain is partially controlled by current medication. Patient has tried OTC medications such as Tylenol and Ibuprofen with out relief.   Adverse Effects: Patient denies constipation or sedation.  Aberrant Behavior:  reviewed with no aberrant drug seeking/taking behavior.  Overdose reversal drug naloxone discussed    Drug screen reviewed      Requested Prescriptions     Signed Prescriptions Disp Refills    HYDROcodone-acetaminophen (NORCO)  mg per tablet 120 tablet 0     Sig: Take 1 tablet by mouth every 6 (six) hours.    HYDROcodone-acetaminophen (NORCO)  mg per tablet 120 tablet 0     Sig: Take 1 tablet by mouth every 6 (six) hours.    HYDROcodone-acetaminophen (NORCO)  mg per tablet 120 tablet 0     Sig: Take 1 tablet by mouth every 6 (six) hours.         Plan:    No new complaints he states he is doing quite well current medication    He states he continues his activities daily living quite well with current medication    He would like to continue with conservative management    Continue home exercise program as directed    Continue current medication    Follow-up 3 months    Dr. Kong, September 2022    Bring original prescription medication bottles/container/box with labels to each visit

## 2022-08-10 ENCOUNTER — OFFICE VISIT (OUTPATIENT)
Dept: FAMILY MEDICINE | Facility: CLINIC | Age: 67
End: 2022-08-10
Payer: MEDICARE

## 2022-08-10 VITALS
RESPIRATION RATE: 18 BRPM | WEIGHT: 257 LBS | BODY MASS INDEX: 32.98 KG/M2 | HEIGHT: 74 IN | TEMPERATURE: 98 F | HEART RATE: 98 BPM | SYSTOLIC BLOOD PRESSURE: 124 MMHG | OXYGEN SATURATION: 99 % | DIASTOLIC BLOOD PRESSURE: 78 MMHG

## 2022-08-10 DIAGNOSIS — I10 HYPERTENSION, UNSPECIFIED TYPE: Primary | ICD-10-CM

## 2022-08-10 DIAGNOSIS — M47.812 CERVICAL SPONDYLOSIS WITHOUT MYELOPATHY: ICD-10-CM

## 2022-08-10 DIAGNOSIS — R45.4 OUTBURSTS OF ANGER: ICD-10-CM

## 2022-08-10 DIAGNOSIS — E78.5 HYPERLIPIDEMIA, UNSPECIFIED HYPERLIPIDEMIA TYPE: ICD-10-CM

## 2022-08-10 DIAGNOSIS — G47.00 INSOMNIA, UNSPECIFIED TYPE: ICD-10-CM

## 2022-08-10 PROCEDURE — 99213 OFFICE O/P EST LOW 20 MIN: CPT | Mod: ,,, | Performed by: NURSE PRACTITIONER

## 2022-08-10 PROCEDURE — 99213 PR OFFICE/OUTPT VISIT, EST, LEVL III, 20-29 MIN: ICD-10-PCS | Mod: ,,, | Performed by: NURSE PRACTITIONER

## 2022-08-10 RX ORDER — ATENOLOL 25 MG/1
25 TABLET ORAL DAILY
Qty: 90 TABLET | Refills: 1 | Status: SHIPPED | OUTPATIENT
Start: 2022-08-10 | End: 2023-02-08 | Stop reason: SDUPTHER

## 2022-08-10 RX ORDER — LOSARTAN POTASSIUM AND HYDROCHLOROTHIAZIDE 12.5; 5 MG/1; MG/1
1 TABLET ORAL DAILY
Qty: 90 TABLET | Refills: 1 | Status: SHIPPED | OUTPATIENT
Start: 2022-08-10 | End: 2023-02-08 | Stop reason: SDUPTHER

## 2022-08-10 RX ORDER — TRAZODONE HYDROCHLORIDE 150 MG/1
150 TABLET ORAL NIGHTLY
Qty: 90 TABLET | Refills: 1 | Status: SHIPPED | OUTPATIENT
Start: 2022-08-10 | End: 2023-02-08 | Stop reason: SDUPTHER

## 2022-08-10 RX ORDER — CYCLOBENZAPRINE HCL 10 MG
10 TABLET ORAL EVERY 8 HOURS PRN
Qty: 90 TABLET | Refills: 1 | Status: SHIPPED | OUTPATIENT
Start: 2022-08-10 | End: 2022-10-10

## 2022-08-10 RX ORDER — ESCITALOPRAM OXALATE 20 MG/1
20 TABLET ORAL DAILY
Qty: 90 TABLET | Refills: 1 | Status: SHIPPED | OUTPATIENT
Start: 2022-08-10 | End: 2023-02-08 | Stop reason: SDUPTHER

## 2022-08-10 NOTE — PROGRESS NOTES
8/10/2022     SOFIA Fitzpatrick   [facility]  [address]  [place] [address]     PATIENT NAME: Simon Voss  : 1955  DATE: 8/10/22  MRN: [id number]      Billing Provider: SOFIA Fitzpatrick  Level of Service:   Patient PCP Information     Provider PCP Type    SOFIA Fitzpatrick General          Reason for Visit / Chief Complaint: Hypertension and Hyperlipidemia (6 month follow up, had fasting labs done earlier this week)       Update PCP  Update Chief Complaint         History of Present Illness / Problem Focused Workflow     Simon Voss presents to the clinic 6 month follow up hypertension and lipids had labs this week. He has been checking blood pressure well controlled is walking daily and cutting back on portions to help with weight loss.       Lab Results   Component Value Date    CHOL 131 2022    CHOL 156 02/10/2022    CHOL 160 2021     Lab Results   Component Value Date    HDL 45 2022    HDL 49 02/10/2022    HDL 53 2021     Lab Results   Component Value Date    LDLCALC 68 2022    LDLCALC 90 02/10/2022    LDLCALC 87 2021     Lab Results   Component Value Date    TRIG 90 2022    TRIG 86 02/10/2022    TRIG 101 2021     Lab Results   Component Value Date    CHOLHDL 2.9 2022    CHOLHDL 3.2 02/10/2022    CHOLHDL 3.0 2021     CMP  Sodium   Date Value Ref Range Status   2022 139 136 - 145 mmol/L Final     Potassium   Date Value Ref Range Status   2022 4.3 3.5 - 5.1 mmol/L Final     Chloride   Date Value Ref Range Status   2022 108 (H) 98 - 107 mmol/L Final     CO2   Date Value Ref Range Status   2022 27 21 - 32 mmol/L Final     Glucose   Date Value Ref Range Status   2022 113 (H) 74 - 106 mg/dL Final   2021 98 mg/dL Final     BUN   Date Value Ref Range Status   2022 22 (H) 7 - 18 mg/dL Final     Creatinine   Date Value Ref Range Status    08/08/2022 1.22 0.70 - 1.30 mg/dL Final     Calcium   Date Value Ref Range Status   08/08/2022 8.7 8.5 - 10.1 mg/dL Final     Total Protein   Date Value Ref Range Status   02/10/2022 7.1 6.4 - 8.2 g/dL Final     Albumin   Date Value Ref Range Status   02/10/2022 3.8 3.5 - 5.0 g/dL Final     Bilirubin, Total   Date Value Ref Range Status   02/10/2022 0.5 0.0 - 1.2 mg/dL Final     Alk Phos   Date Value Ref Range Status   02/10/2022 46 45 - 115 U/L Final     AST   Date Value Ref Range Status   02/10/2022 19 15 - 37 U/L Final     ALT   Date Value Ref Range Status   02/10/2022 30 16 - 61 U/L Final     Anion Gap   Date Value Ref Range Status   08/08/2022 8 7 - 16 mmol/L Final     eGFR   Date Value Ref Range Status   02/10/2022 54 (L) >=60 mL/min/1.73m² Final     Wt Readings from Last 3 Encounters:   08/10/22 0813 116.6 kg (257 lb)   05/17/22 0949 118.8 kg (262 lb)   02/17/22 1036 118.8 kg (262 lb)       Review of Systems     Review of Systems   Constitutional: Negative for appetite change, fatigue and fever.   Eyes: Negative for visual disturbance.   Respiratory: Negative for cough, chest tightness, shortness of breath and wheezing.    Cardiovascular: Negative for chest pain, palpitations, leg swelling and claudication.   Gastrointestinal: Positive for abdominal pain. Negative for change in bowel habit, nausea, vomiting and change in bowel habit.   Genitourinary: Negative for difficulty urinating and frequency.   Neurological: Negative for weakness and headaches.        Medical / Social / Family History     Past Medical History:   Diagnosis Date    Anxiety     Cervical spondylosis without myelopathy     Chronic pain syndrome     Depression     Generalized abdominal pain     High cholesterol     Hypertension        Past Surgical History:   Procedure Laterality Date    APPENDECTOMY         Social History    reports that he has quit smoking. His smokeless tobacco use includes snuff and chew. He reports previous  "alcohol use. He reports that he does not use drugs.    Family History  MrPer's family history includes Cancer in his mother; Diabetes in his maternal grandmother.    Medications and Allergies     Medications  Outpatient Medications Marked as Taking for the 8/10/22 encounter (Office Visit) with SOFIA Fitzpatrick   Medication Sig Dispense Refill    atenoloL (TENORMIN) 25 MG tablet Take 1 tablet (25 mg total) by mouth once daily. 90 tablet 1    cyclobenzaprine (FLEXERIL) 10 MG tablet TAKE ONE TABLET BY MOUTH EVERY 8 HOURS AS NEEDED 90 tablet 0    EScitalopram oxalate (LEXAPRO) 20 MG tablet Take 1.5 tablets by mouth daily x6 weeks, then decrease back to 1 tablet daily 90 tablet 1    HYDROcodone-acetaminophen (NORCO)  mg per tablet Take 1 tablet by mouth every 6 (six) hours. 120 tablet 0    losartan-hydrochlorothiazide 50-12.5 mg (HYZAAR) 50-12.5 mg per tablet Take 1 tablet by mouth once daily. 90 tablet 1    lovastatin (MEVACOR) 40 MG tablet Take 1 tablet (40 mg total) by mouth nightly. 90 tablet 1    traZODone (DESYREL) 150 MG tablet Take 1 tablet (150 mg total) by mouth every evening. 90 tablet 1       Allergies  Review of patient's allergies indicates:  No Known Allergies    Physical Examination     Vitals:    08/10/22 0813   BP: 124/78   BP Location: Left arm   Patient Position: Sitting   Pulse: 98   Resp: 18   Temp: 98.3 °F (36.8 °C)   TempSrc: Oral   SpO2: 99%   Weight: 116.6 kg (257 lb)   Height: 6' 2" (1.88 m)      Physical Exam  Vitals and nursing note reviewed.   Constitutional:       Appearance: Normal appearance. He is obese.   HENT:      Right Ear: Tympanic membrane, ear canal and external ear normal.      Left Ear: Tympanic membrane, ear canal and external ear normal.      Nose: Nose normal.      Mouth/Throat:      Mouth: Mucous membranes are moist.   Eyes:      Extraocular Movements: Extraocular movements intact.      Conjunctiva/sclera: Conjunctivae normal.      Pupils: Pupils are " equal, round, and reactive to light.   Cardiovascular:      Rate and Rhythm: Normal rate and regular rhythm.      Pulses: Normal pulses.      Heart sounds: Normal heart sounds.   Pulmonary:      Effort: Pulmonary effort is normal.      Breath sounds: Normal breath sounds.   Musculoskeletal:         General: Normal range of motion.      Cervical back: Normal range of motion.   Skin:     General: Skin is warm and dry.   Neurological:      General: No focal deficit present.      Mental Status: He is alert and oriented to person, place, and time.   Psychiatric:         Mood and Affect: Mood normal.         Behavior: Behavior normal.          Assessment and Plan (including Health Maintenance)      Problem List  Smart [id number]  Document Outside HM   :    Plan:   - work on diet, specifically cutting back bread, breaded things, cereal, pasta, potatoes, rice  - try to exercise at least 150min a week divided however you want  - if you can do weight, even very light weight do them  - try not to use to much salt, if any, remember that things that are preserved or frozen often contain large amounts of salt as a preserve      Health Maintenance Due   Topic Date Due    Sign Pain Contract  Never done    Abdominal Aortic Aneurysm Screening  Never done    Pneumococcal Vaccines (Age 65+) (2 - PPSV23 or PCV20) 11/10/2021       Problem List Items Addressed This Visit        Neuro    Cervical spondylosis without myelopathy (Chronic)      Other Visit Diagnoses     Hypertension, unspecified type    -  Primary    Hyperlipidemia, unspecified hyperlipidemia type        Outbursts of anger        Insomnia, unspecified type            Hypertension, unspecified type    Hyperlipidemia, unspecified hyperlipidemia type    Cervical spondylosis without myelopathy    Outbursts of anger    Insomnia, unspecified type       Health Maintenance Topics with due status: Not Due       Topic Last Completion Date    TETANUS VACCINE 11/10/2020    PROSTATE-SPECIFIC  ANTIGEN 09/20/2021    Lipid Panel 08/08/2022    Influenza Vaccine Not Due       Procedures     Future Appointments   Date Time Provider Department Center   8/17/2022 10:00 AM [facility]   9/21/2022  2:30 PM AWV NURSE, [facility] VERITO Jaquez        Follow up in about 6 months (around 2/10/2023), or if symptoms worsen or fail to improve.     Signature:  SOFIA Fitzpatrick    Date of encounter: 8/10/22

## 2022-08-16 NOTE — PROGRESS NOTES
Subjective:      Patient ID: Simon Voss is a 66 y.o. male.    Chief Complaint: Abdominal Pain, Neck Pain, and Back Pain      Abdominal Pain  This is a chronic problem. The current episode started more than 1 year ago. The problem occurs daily. The problem has been waxing and waning. Associated symptoms include arthralgias. Pertinent negatives include no diarrhea, dysuria, fever, frequency or headaches.   Pain  This is a chronic problem. The current episode started more than 1 year ago. The problem occurs daily. The problem has been waxing and waning. Associated symptoms include arthralgias and neck pain. Pertinent negatives include no fatigue, fever or headaches.     Review of Systems   Constitutional: Negative for activity change, appetite change, fatigue and fever.   HENT: Negative for drooling, ear discharge, ear pain, facial swelling, mouth sores, nosebleeds, trouble swallowing, voice change and goiter.    Eyes: Negative for photophobia, pain, discharge, redness and visual disturbance.   Respiratory: Negative for apnea, choking, chest tightness, shortness of breath, wheezing and stridor.    Cardiovascular: Negative for palpitations and leg swelling.   Gastrointestinal: Negative for abdominal distention, diarrhea, rectal pain and fecal incontinence.   Endocrine: Negative for cold intolerance, heat intolerance, polydipsia, polyphagia and polyuria.   Genitourinary: Negative for bladder incontinence, dysuria, flank pain and frequency.   Musculoskeletal: Positive for arthralgias, back pain, leg pain, neck pain and neck stiffness.   Integumentary:  Negative for color change and pallor.   Neurological: Negative for dizziness, seizures, syncope, facial asymmetry, speech difficulty, light-headedness, headaches, disturbances in coordination, memory loss and coordination difficulties.   Hematological: Negative for adenopathy. Does not bruise/bleed easily.   Psychiatric/Behavioral: Negative for agitation,  "behavioral problems, confusion, decreased concentration, hallucinations, self-injury and suicidal ideas. The patient is not nervous/anxious and is not hyperactive.            Past Surgical History:   Procedure Laterality Date    APPENDECTOMY            Objective:  Vitals:    08/17/22 1127   BP: 132/87   Pulse: 63   Resp: 18   SpO2: 99%   Weight: 116.6 kg (257 lb)   Height: 6' 2" (1.88 m)   PainSc:   7         Physical Exam  Vitals and nursing note reviewed. Exam conducted with a chaperone present.   Constitutional:       General: He is awake.      Appearance: Normal appearance. He is not diaphoretic.   HENT:      Head: Normocephalic and atraumatic.      Nose: Nose normal.      Mouth/Throat:      Mouth: Mucous membranes are moist.      Pharynx: Oropharynx is clear.   Eyes:      Conjunctiva/sclera: Conjunctivae normal.      Pupils: Pupils are equal, round, and reactive to light.   Cardiovascular:      Rate and Rhythm: Normal rate.   Pulmonary:      Effort: Pulmonary effort is normal. No respiratory distress.   Abdominal:      Palpations: Abdomen is soft.      Tenderness: There is abdominal tenderness.   Musculoskeletal:      Cervical back: Normal range of motion and neck supple.      Thoracic back: Tenderness present.      Lumbar back: Tenderness present. Decreased range of motion.   Skin:     General: Skin is warm and dry.   Neurological:      General: No focal deficit present.      Mental Status: He is alert and oriented to person, place, and time. Mental status is at baseline.      Cranial Nerves: No cranial nerve deficit (II-XII).   Psychiatric:         Mood and Affect: Mood normal.         Behavior: Behavior normal. Behavior is cooperative.         Thought Content: Thought content normal.           No image results found.       Lab Visit on 08/08/2022   Component Date Value Ref Range Status    Triglycerides 08/08/2022 90  35 - 150 mg/dL Final    Cholesterol 08/08/2022 131  0 - 200 mg/dL Final    HDL " Cholesterol 08/08/2022 45  40 - 60 mg/dL Final    Cholesterol/HDL Ratio (Risk Factor) 08/08/2022 2.9   Final    Non-HDL 08/08/2022 86  mg/dL Final    LDL Calculated 08/08/2022 68  mg/dL Final    LDL/HDL 08/08/2022 1.5   Final    VLDL 08/08/2022 18  mg/dL Final    Sodium 08/08/2022 139  136 - 145 mmol/L Final    Potassium 08/08/2022 4.3  3.5 - 5.1 mmol/L Final    Chloride 08/08/2022 108 (A) 98 - 107 mmol/L Final    CO2 08/08/2022 27  21 - 32 mmol/L Final    Anion Gap 08/08/2022 8  7 - 16 mmol/L Final    Glucose 08/08/2022 113 (A) 74 - 106 mg/dL Final    BUN 08/08/2022 22 (A) 7 - 18 mg/dL Final    Creatinine 08/08/2022 1.22  0.70 - 1.30 mg/dL Final    BUN/Creatinine Ratio 08/08/2022 18  6 - 20 Final    Calcium 08/08/2022 8.7  8.5 - 10.1 mg/dL Final    eGFR 08/08/2022 65  >=60 mL/min/1.73m² Final   Office Visit on 05/17/2022   Component Date Value Ref Range Status    POC Amphetamines 05/17/2022 Negative  Negative, Inconclusive Final    POC Barbiturates 05/17/2022 Negative  Negative, Inconclusive Final    POC Benzodiazepines 05/17/2022 Negative  Negative, Inconclusive Final    POC Cocaine 05/17/2022 Negative  Negative, Inconclusive Final    POC THC 05/17/2022 Negative  Negative, Inconclusive Final    POC Methadone 05/17/2022 Negative  Negative, Inconclusive Final    POC Methamphetamine 05/17/2022 Negative  Negative, Inconclusive Final    POC Opiates 05/17/2022 Presumptive Positive (A) Negative, Inconclusive Final    POC Oxycodone 05/17/2022 Negative  Negative, Inconclusive Final    POC Phencyclidine 05/17/2022 Negative  Negative, Inconclusive Final    POC Methylenedioxymethamphetamine * 05/17/2022 Negative  Negative, Inconclusive Final    POC Tricyclic Antidepressants 05/17/2022 Negative  Negative, Inconclusive Final    POC Buprenorphine 05/17/2022 Negative   Final     Acceptable 05/17/2022 Yes   Final    POC Temperature (Urine) 05/17/2022 92   Final           Assessment:       1. Generalized abdominal pain    2. Cervical spondylosis without myelopathy    3. Encounter for long-term (current) use of other medications          Orders Placed This Encounter   Procedures    POCT Urine Drug Screen Presump     Interpretive Information:     Negative:  No drug detected at the cut off level.   Positive:  This result represents presumptive positive for the   tested drug, other substances may yield a positive response other   than the analyte of interest. This result should be utilized for   diagnostic purpose only. Confirmation testing will be performed upon physician request only.            A's of Opioid Risk Assessment  Activity:Patient can perform ADL.   Analgesia:Patients pain is partially controlled by current medication. Patient has tried OTC medications such as Tylenol and Ibuprofen with out relief.   Adverse Effects: Patient denies constipation or sedation.  Aberrant Behavior:  reviewed with no aberrant drug seeking/taking behavior.  Overdose reversal drug naloxone discussed    Drug screen reviewed      Requested Prescriptions     Signed Prescriptions Disp Refills    HYDROcodone-acetaminophen (NORCO)  mg per tablet 120 tablet 0     Sig: Take 1 tablet by mouth every 6 (six) hours.    HYDROcodone-acetaminophen (NORCO)  mg per tablet 120 tablet 0     Sig: Take 1 tablet by mouth every 6 (six) hours.    HYDROcodone-acetaminophen (NORCO)  mg per tablet 120 tablet 0     Sig: Take 1 tablet by mouth every 6 (six) hours.         Plan:    Chronic abdominal pain After mesh placement after surgery    He states current medications helping control his chronic neck and joint pain chronic abdominal pain    He would like to continue with conservative management    Continue home exercise program as directed    Continue current medication    Follow-up 3 months    Dr. Kong, September 2022    Pill count    Physical therapy    Bring original prescription medication  bottles/container/box with labels to each visit

## 2022-08-17 ENCOUNTER — OFFICE VISIT (OUTPATIENT)
Dept: PAIN MEDICINE | Facility: CLINIC | Age: 67
End: 2022-08-17
Payer: MEDICARE

## 2022-08-17 VITALS
SYSTOLIC BLOOD PRESSURE: 132 MMHG | DIASTOLIC BLOOD PRESSURE: 87 MMHG | WEIGHT: 257 LBS | OXYGEN SATURATION: 99 % | HEART RATE: 63 BPM | RESPIRATION RATE: 18 BRPM | BODY MASS INDEX: 32.98 KG/M2 | HEIGHT: 74 IN

## 2022-08-17 DIAGNOSIS — R10.84 GENERALIZED ABDOMINAL PAIN: Primary | Chronic | ICD-10-CM

## 2022-08-17 DIAGNOSIS — Z79.899 ENCOUNTER FOR LONG-TERM (CURRENT) USE OF OTHER MEDICATIONS: ICD-10-CM

## 2022-08-17 DIAGNOSIS — M47.812 CERVICAL SPONDYLOSIS WITHOUT MYELOPATHY: Chronic | ICD-10-CM

## 2022-08-17 PROCEDURE — 99214 OFFICE O/P EST MOD 30 MIN: CPT | Mod: PBBFAC | Performed by: PHYSICIAN ASSISTANT

## 2022-08-17 PROCEDURE — 99214 PR OFFICE/OUTPT VISIT, EST, LEVL IV, 30-39 MIN: ICD-10-PCS | Mod: S$PBB,,, | Performed by: PHYSICIAN ASSISTANT

## 2022-08-17 PROCEDURE — 99214 OFFICE O/P EST MOD 30 MIN: CPT | Mod: S$PBB,,, | Performed by: PHYSICIAN ASSISTANT

## 2022-08-17 PROCEDURE — 80305 DRUG TEST PRSMV DIR OPT OBS: CPT | Mod: PBBFAC | Performed by: PHYSICIAN ASSISTANT

## 2022-08-17 RX ORDER — HYDROCODONE BITARTRATE AND ACETAMINOPHEN 10; 325 MG/1; MG/1
1 TABLET ORAL EVERY 6 HOURS
Qty: 120 TABLET | Refills: 0 | Status: SHIPPED | OUTPATIENT
Start: 2022-09-16 | End: 2022-10-16

## 2022-08-17 RX ORDER — HYDROCODONE BITARTRATE AND ACETAMINOPHEN 10; 325 MG/1; MG/1
1 TABLET ORAL EVERY 6 HOURS
Qty: 120 TABLET | Refills: 0 | Status: SHIPPED | OUTPATIENT
Start: 2022-10-18 | End: 2022-11-14 | Stop reason: SDUPTHER

## 2022-08-17 RX ORDER — HYDROCODONE BITARTRATE AND ACETAMINOPHEN 10; 325 MG/1; MG/1
1 TABLET ORAL EVERY 6 HOURS
Qty: 120 TABLET | Refills: 0 | Status: SHIPPED | OUTPATIENT
Start: 2022-08-19 | End: 2022-09-18

## 2022-10-09 DIAGNOSIS — Z71.89 COMPLEX CARE COORDINATION: ICD-10-CM

## 2022-11-14 NOTE — PROGRESS NOTES
Subjective:         Patient ID: Simon Voss is a 66 y.o. male.    Chief Complaint: Abdominal Pain      Pain  This is a chronic problem. The current episode started more than 1 year ago. The problem occurs daily. The problem has been unchanged. Associated symptoms include abdominal pain, arthralgias and neck pain. Pertinent negatives include no anorexia, chills, diaphoresis, fatigue, fever, headaches, swollen glands, urinary symptoms or vertigo.   Review of Systems   Constitutional:  Negative for activity change, appetite change, chills, diaphoresis, fatigue and fever.   HENT:  Negative for drooling, ear discharge, ear pain, facial swelling, mouth sores, nosebleeds, trouble swallowing, voice change and goiter.    Eyes:  Negative for photophobia, pain, discharge, redness and visual disturbance.   Respiratory:  Negative for apnea, choking, chest tightness, shortness of breath, wheezing and stridor.    Cardiovascular:  Negative for palpitations and leg swelling.   Gastrointestinal:  Positive for abdominal pain. Negative for abdominal distention, anorexia, diarrhea, rectal pain and fecal incontinence.   Endocrine: Negative for cold intolerance, heat intolerance, polydipsia, polyphagia and polyuria.   Genitourinary:  Negative for bladder incontinence, dysuria, flank pain and frequency.   Musculoskeletal:  Positive for arthralgias, back pain, leg pain, neck pain and neck stiffness.   Integumentary:  Negative for color change and pallor.   Neurological:  Negative for dizziness, vertigo, seizures, syncope, facial asymmetry, speech difficulty, light-headedness, headaches, coordination difficulties, memory loss and coordination difficulties.   Hematological:  Negative for adenopathy. Does not bruise/bleed easily.   Psychiatric/Behavioral:  Negative for agitation, behavioral problems, confusion, decreased concentration, hallucinations, self-injury and suicidal ideas. The patient is not nervous/anxious and is not  "hyperactive.          Past Medical History:   Diagnosis Date    Anxiety     Cervical spondylosis without myelopathy     Chronic pain syndrome     Depression     Generalized abdominal pain     High cholesterol     Hypertension      Past Surgical History:   Procedure Laterality Date    APPENDECTOMY       Social History     Socioeconomic History    Marital status:    Tobacco Use    Smoking status: Former    Smokeless tobacco: Current     Types: Snuff, Chew   Substance and Sexual Activity    Alcohol use: Not Currently    Drug use: Never    Sexual activity: Not Currently     Family History   Problem Relation Age of Onset    Cancer Mother     Diabetes Maternal Grandmother      Review of patient's allergies indicates:  No Known Allergies     Objective:  Vitals:    11/17/22 1022   BP: 128/80   Pulse: 72   Resp: 18   Weight: 114.3 kg (252 lb)   Height: 6' 2" (1.88 m)   PainSc:   7         Physical Exam  Vitals and nursing note reviewed. Exam conducted with a chaperone present.   Constitutional:       General: He is awake. He is not in acute distress.     Appearance: Normal appearance. He is not toxic-appearing or diaphoretic.   HENT:      Head: Normocephalic and atraumatic.      Nose: Nose normal.      Mouth/Throat:      Mouth: Mucous membranes are moist.      Pharynx: Oropharynx is clear.   Eyes:      Conjunctiva/sclera: Conjunctivae normal.      Pupils: Pupils are equal, round, and reactive to light.   Cardiovascular:      Rate and Rhythm: Normal rate.   Pulmonary:      Effort: Pulmonary effort is normal. No respiratory distress.   Abdominal:      Palpations: Abdomen is soft.      Tenderness: There is abdominal tenderness.   Musculoskeletal:      Cervical back: Normal range of motion and neck supple.      Thoracic back: Tenderness present.      Lumbar back: Tenderness present. Decreased range of motion.   Skin:     General: Skin is warm and dry.   Neurological:      General: No focal deficit present.      Mental " Status: He is alert and oriented to person, place, and time. Mental status is at baseline.      Cranial Nerves: No cranial nerve deficit (II-XII).   Psychiatric:         Mood and Affect: Mood normal.         Behavior: Behavior normal. Behavior is cooperative.         Thought Content: Thought content normal.         No image results found.       Office Visit on 08/17/2022   Component Date Value Ref Range Status    POC Amphetamines 08/17/2022 Negative  Negative, Inconclusive Final    POC Barbiturates 08/17/2022 Negative  Negative, Inconclusive Final    POC Benzodiazepines 08/17/2022 Negative  Negative, Inconclusive Final    POC Cocaine 08/17/2022 Negative  Negative, Inconclusive Final    POC THC 08/17/2022 Negative  Negative, Inconclusive Final    POC Methadone 08/17/2022 Negative  Negative, Inconclusive Final    POC Methamphetamine 08/17/2022 Negative  Negative, Inconclusive Final    POC Opiates 08/17/2022 Presumptive Positive (A)  Negative, Inconclusive Final    POC Oxycodone 08/17/2022 Negative  Negative, Inconclusive Final    POC Phencyclidine 08/17/2022 Negative  Negative, Inconclusive Final    POC Methylenedioxymethamphetamine * 08/17/2022 Negative  Negative, Inconclusive Final    POC Tricyclic Antidepressants 08/17/2022 Negative  Negative, Inconclusive Final    POC Buprenorphine 08/17/2022 Negative   Final     Acceptable 08/17/2022 Yes   Final    POC Temperature (Urine) 08/17/2022 94   Final   Lab Visit on 08/08/2022   Component Date Value Ref Range Status    Triglycerides 08/08/2022 90  35 - 150 mg/dL Final    Cholesterol 08/08/2022 131  0 - 200 mg/dL Final    HDL Cholesterol 08/08/2022 45  40 - 60 mg/dL Final    Cholesterol/HDL Ratio (Risk Factor) 08/08/2022 2.9   Final    Non-HDL 08/08/2022 86  mg/dL Final    LDL Calculated 08/08/2022 68  mg/dL Final    LDL/HDL 08/08/2022 1.5   Final    VLDL 08/08/2022 18  mg/dL Final    Sodium 08/08/2022 139  136 - 145 mmol/L Final    Potassium 08/08/2022 4.3   3.5 - 5.1 mmol/L Final    Chloride 08/08/2022 108 (H)  98 - 107 mmol/L Final    CO2 08/08/2022 27  21 - 32 mmol/L Final    Anion Gap 08/08/2022 8  7 - 16 mmol/L Final    Glucose 08/08/2022 113 (H)  74 - 106 mg/dL Final    BUN 08/08/2022 22 (H)  7 - 18 mg/dL Final    Creatinine 08/08/2022 1.22  0.70 - 1.30 mg/dL Final    BUN/Creatinine Ratio 08/08/2022 18  6 - 20 Final    Calcium 08/08/2022 8.7  8.5 - 10.1 mg/dL Final    eGFR 08/08/2022 65  >=60 mL/min/1.73m² Final         Orders Placed This Encounter   Procedures    POCT Urine Drug Screen Presump     Interpretive Information:     Negative:  No drug detected at the cut off level.   Positive:  This result represents presumptive positive for the   tested drug, other substances may yield a positive response other   than the analyte of interest. This result should be utilized for   diagnostic purpose only. Confirmation testing will be performed upon physician request only.          Requested Prescriptions     Signed Prescriptions Disp Refills    HYDROcodone-acetaminophen (NORCO)  mg per tablet 120 tablet 0     Sig: Take 1 tablet by mouth every 6 (six) hours.    HYDROcodone-acetaminophen (NORCO)  mg per tablet 120 tablet 0     Sig: Take 1 tablet by mouth every 6 (six) hours.    HYDROcodone-acetaminophen (NORCO)  mg per tablet 120 tablet 0     Sig: Take 1 tablet by mouth every 6 (six) hours.       Assessment:     1. Cervical spondylosis without myelopathy    2. Generalized abdominal pain    3. Encounter for long-term (current) use of other medications         A's of Opioid Risk Assessment  Activity:Patient can perform ADL.   Analgesia:Patients pain is partially controlled by current medication. Patient has tried OTC medications such as Tylenol and Ibuprofen with out relief.   Adverse Effects: Patient denies constipation or sedation.  Aberrant Behavior:  reviewed with no aberrant drug seeking/taking behavior.  Overdose reversal drug naloxone  discussed    Drug screen reviewed      Plan:    Chronic abdominal pain After mesh placement after surgery    No new complaints today he states current medication continues to help with his discomfort    He would like to continue with conservative management    Continue home exercise program as directed    Continue current medication    Follow-up 3 months    Dr. Kong, September 2022    Bring original prescription medication bottles/container/box with labels to each visit    Pill count    Physical therapy    Massage therapy declines

## 2022-11-17 ENCOUNTER — OFFICE VISIT (OUTPATIENT)
Dept: PAIN MEDICINE | Facility: CLINIC | Age: 67
End: 2022-11-17
Payer: MEDICARE

## 2022-11-17 VITALS
RESPIRATION RATE: 18 BRPM | DIASTOLIC BLOOD PRESSURE: 80 MMHG | HEIGHT: 74 IN | WEIGHT: 252 LBS | SYSTOLIC BLOOD PRESSURE: 128 MMHG | HEART RATE: 72 BPM | BODY MASS INDEX: 32.34 KG/M2

## 2022-11-17 DIAGNOSIS — M47.812 CERVICAL SPONDYLOSIS WITHOUT MYELOPATHY: Primary | Chronic | ICD-10-CM

## 2022-11-17 DIAGNOSIS — R10.84 GENERALIZED ABDOMINAL PAIN: Chronic | ICD-10-CM

## 2022-11-17 DIAGNOSIS — Z79.899 ENCOUNTER FOR LONG-TERM (CURRENT) USE OF OTHER MEDICATIONS: ICD-10-CM

## 2022-11-17 PROCEDURE — 80305 DRUG TEST PRSMV DIR OPT OBS: CPT | Mod: PBBFAC | Performed by: PHYSICIAN ASSISTANT

## 2022-11-17 PROCEDURE — 99214 OFFICE O/P EST MOD 30 MIN: CPT | Mod: PBBFAC | Performed by: PHYSICIAN ASSISTANT

## 2022-11-17 PROCEDURE — 99214 OFFICE O/P EST MOD 30 MIN: CPT | Mod: S$PBB,,, | Performed by: PHYSICIAN ASSISTANT

## 2022-11-17 PROCEDURE — 99214 PR OFFICE/OUTPT VISIT, EST, LEVL IV, 30-39 MIN: ICD-10-PCS | Mod: S$PBB,,, | Performed by: PHYSICIAN ASSISTANT

## 2022-11-17 RX ORDER — HYDROCODONE BITARTRATE AND ACETAMINOPHEN 10; 325 MG/1; MG/1
1 TABLET ORAL EVERY 6 HOURS
Qty: 120 TABLET | Refills: 0 | Status: SHIPPED | OUTPATIENT
Start: 2023-01-16 | End: 2023-02-15

## 2022-11-17 RX ORDER — HYDROCODONE BITARTRATE AND ACETAMINOPHEN 10; 325 MG/1; MG/1
1 TABLET ORAL EVERY 6 HOURS
Qty: 120 TABLET | Refills: 0 | Status: SHIPPED | OUTPATIENT
Start: 2022-11-17 | End: 2022-12-17

## 2022-11-17 RX ORDER — HYDROCODONE BITARTRATE AND ACETAMINOPHEN 10; 325 MG/1; MG/1
1 TABLET ORAL EVERY 6 HOURS
Qty: 120 TABLET | Refills: 0 | Status: SHIPPED | OUTPATIENT
Start: 2022-12-16 | End: 2023-01-15

## 2023-01-10 DIAGNOSIS — M47.812 CERVICAL SPONDYLOSIS WITHOUT MYELOPATHY: ICD-10-CM

## 2023-01-10 DIAGNOSIS — E78.5 HYPERLIPIDEMIA, UNSPECIFIED HYPERLIPIDEMIA TYPE: ICD-10-CM

## 2023-01-10 RX ORDER — LOVASTATIN 40 MG/1
40 TABLET ORAL NIGHTLY
Qty: 90 TABLET | Refills: 1 | Status: SHIPPED | OUTPATIENT
Start: 2023-01-10 | End: 2023-02-08 | Stop reason: SDUPTHER

## 2023-01-10 RX ORDER — CYCLOBENZAPRINE HCL 10 MG
10 TABLET ORAL 3 TIMES DAILY
Qty: 270 TABLET | Refills: 1 | Status: SHIPPED | OUTPATIENT
Start: 2023-01-10 | End: 2023-02-08 | Stop reason: SDUPTHER

## 2023-02-08 ENCOUNTER — OFFICE VISIT (OUTPATIENT)
Dept: FAMILY MEDICINE | Facility: CLINIC | Age: 68
End: 2023-02-08
Payer: MEDICARE

## 2023-02-08 VITALS
TEMPERATURE: 98 F | RESPIRATION RATE: 20 BRPM | DIASTOLIC BLOOD PRESSURE: 80 MMHG | HEART RATE: 72 BPM | HEIGHT: 74 IN | SYSTOLIC BLOOD PRESSURE: 122 MMHG | WEIGHT: 253.06 LBS | OXYGEN SATURATION: 98 % | BODY MASS INDEX: 32.48 KG/M2

## 2023-02-08 DIAGNOSIS — Z00.00 ENCOUNTER FOR SUBSEQUENT ANNUAL WELLNESS VISIT (AWV) IN MEDICARE PATIENT: Primary | ICD-10-CM

## 2023-02-08 DIAGNOSIS — G89.4 CHRONIC PAIN SYNDROME: ICD-10-CM

## 2023-02-08 DIAGNOSIS — Z12.5 ENCOUNTER FOR SCREENING FOR MALIGNANT NEOPLASM OF PROSTATE: ICD-10-CM

## 2023-02-08 DIAGNOSIS — M47.812 CERVICAL SPONDYLOSIS WITHOUT MYELOPATHY: Chronic | ICD-10-CM

## 2023-02-08 DIAGNOSIS — I10 HYPERTENSION, UNSPECIFIED TYPE: ICD-10-CM

## 2023-02-08 DIAGNOSIS — G47.00 INSOMNIA, UNSPECIFIED TYPE: ICD-10-CM

## 2023-02-08 DIAGNOSIS — E78.5 HYPERLIPIDEMIA, UNSPECIFIED HYPERLIPIDEMIA TYPE: ICD-10-CM

## 2023-02-08 DIAGNOSIS — F32.A DEPRESSION, UNSPECIFIED DEPRESSION TYPE: ICD-10-CM

## 2023-02-08 LAB
ALBUMIN SERPL BCP-MCNC: 4 G/DL (ref 3.5–5)
ALBUMIN/GLOB SERPL: 1.3 {RATIO}
ALP SERPL-CCNC: 52 U/L (ref 45–115)
ALT SERPL W P-5'-P-CCNC: 31 U/L (ref 16–61)
ANION GAP SERPL CALCULATED.3IONS-SCNC: 7 MMOL/L (ref 7–16)
AST SERPL W P-5'-P-CCNC: 23 U/L (ref 15–37)
BILIRUB SERPL-MCNC: 0.4 MG/DL (ref ?–1.2)
BUN SERPL-MCNC: 20 MG/DL (ref 7–18)
BUN/CREAT SERPL: 15 (ref 6–20)
CALCIUM SERPL-MCNC: 9.3 MG/DL (ref 8.5–10.1)
CHLORIDE SERPL-SCNC: 107 MMOL/L (ref 98–107)
CHOLEST SERPL-MCNC: 161 MG/DL (ref 0–200)
CHOLEST/HDLC SERPL: 3 {RATIO}
CO2 SERPL-SCNC: 29 MMOL/L (ref 21–32)
CREAT SERPL-MCNC: 1.32 MG/DL (ref 0.7–1.3)
EGFR (NO RACE VARIABLE) (RUSH/TITUS): 59 ML/MIN/1.73M²
GLOBULIN SER-MCNC: 3.2 G/DL (ref 2–4)
GLUCOSE SERPL-MCNC: 103 MG/DL (ref 74–106)
HDLC SERPL-MCNC: 53 MG/DL (ref 40–60)
LDLC SERPL CALC-MCNC: 91 MG/DL
LDLC/HDLC SERPL: 1.7 {RATIO}
NONHDLC SERPL-MCNC: 108 MG/DL
POTASSIUM SERPL-SCNC: 4.4 MMOL/L (ref 3.5–5.1)
PROT SERPL-MCNC: 7.2 G/DL (ref 6.4–8.2)
PSA SERPL-MCNC: 0.37 NG/ML
SODIUM SERPL-SCNC: 139 MMOL/L (ref 136–145)
TRIGL SERPL-MCNC: 86 MG/DL (ref 35–150)
VLDLC SERPL-MCNC: 17 MG/DL

## 2023-02-08 PROCEDURE — 80061 LIPID PANEL: ICD-10-PCS | Mod: ,,, | Performed by: CLINICAL MEDICAL LABORATORY

## 2023-02-08 PROCEDURE — G0439 PPPS, SUBSEQ VISIT: HCPCS | Mod: ,,, | Performed by: NURSE PRACTITIONER

## 2023-02-08 PROCEDURE — G0103 PSA SCREENING: HCPCS | Mod: ,,, | Performed by: CLINICAL MEDICAL LABORATORY

## 2023-02-08 PROCEDURE — G0439 PR MEDICARE ANNUAL WELLNESS SUBSEQUENT VISIT: ICD-10-PCS | Mod: ,,, | Performed by: NURSE PRACTITIONER

## 2023-02-08 PROCEDURE — 80053 COMPREHEN METABOLIC PANEL: CPT | Mod: ,,, | Performed by: CLINICAL MEDICAL LABORATORY

## 2023-02-08 PROCEDURE — 80061 LIPID PANEL: CPT | Mod: ,,, | Performed by: CLINICAL MEDICAL LABORATORY

## 2023-02-08 PROCEDURE — 80053 COMPREHENSIVE METABOLIC PANEL: ICD-10-PCS | Mod: ,,, | Performed by: CLINICAL MEDICAL LABORATORY

## 2023-02-08 PROCEDURE — G0103 PSA, SCREENING: ICD-10-PCS | Mod: ,,, | Performed by: CLINICAL MEDICAL LABORATORY

## 2023-02-08 RX ORDER — CYCLOBENZAPRINE HCL 10 MG
10 TABLET ORAL 3 TIMES DAILY
Qty: 270 TABLET | Refills: 1 | Status: SHIPPED | OUTPATIENT
Start: 2023-02-08 | End: 2023-06-06 | Stop reason: SDUPTHER

## 2023-02-08 RX ORDER — TRAZODONE HYDROCHLORIDE 150 MG/1
150 TABLET ORAL NIGHTLY
Qty: 90 TABLET | Refills: 1 | Status: SHIPPED | OUTPATIENT
Start: 2023-02-08 | End: 2023-12-11 | Stop reason: SDUPTHER

## 2023-02-08 RX ORDER — LOSARTAN POTASSIUM AND HYDROCHLOROTHIAZIDE 12.5; 5 MG/1; MG/1
1 TABLET ORAL DAILY
Qty: 90 TABLET | Refills: 1 | Status: SHIPPED | OUTPATIENT
Start: 2023-02-08 | End: 2023-06-06 | Stop reason: SDUPTHER

## 2023-02-08 RX ORDER — LOVASTATIN 40 MG/1
40 TABLET ORAL NIGHTLY
Qty: 90 TABLET | Refills: 1 | Status: SHIPPED | OUTPATIENT
Start: 2023-02-08 | End: 2023-06-06 | Stop reason: SDUPTHER

## 2023-02-08 RX ORDER — ESCITALOPRAM OXALATE 20 MG/1
20 TABLET ORAL DAILY
Qty: 90 TABLET | Refills: 1 | Status: SHIPPED | OUTPATIENT
Start: 2023-02-08 | End: 2023-06-06 | Stop reason: SDUPTHER

## 2023-02-08 RX ORDER — ATENOLOL 25 MG/1
25 TABLET ORAL DAILY
Qty: 90 TABLET | Refills: 1 | Status: SHIPPED | OUTPATIENT
Start: 2023-02-08 | End: 2023-06-06 | Stop reason: SDUPTHER

## 2023-02-08 NOTE — ASSESSMENT & PLAN NOTE
Well controlled continue current medications follow low sodium diet and get regular physical activity

## 2023-02-08 NOTE — PROGRESS NOTES
[facility] AWV   [facility]     PATIENT NAME: Simon Voss   : 1955    AGE: 67 y.o. DATE: 2023   MRN: [id number]        Reason for Visit / Chief Complaint: Medicare AWV (Medicare SUBS AWV )        Simon Voss presents for a Subsequent Medicare AWV today. He has questions about medical marijuana law discussed and will discuss with her pain treatment provider     The following components were reviewed and updated:    Medical/Social/Family History:  Past Medical History:   Diagnosis Date    Anxiety     Cervical spondylosis without myelopathy     Chronic pain syndrome     Depression     Generalized abdominal pain     High cholesterol     Hypertension         Family History   Problem Relation Age of Onset    Cancer Mother     Diabetes Maternal Grandmother         Social History     Tobacco Use   Smoking Status Former    Packs/day: 0.50    Years: 14.00    Pack years: 7.00    Types: Cigarettes    Start date:     Quit date:     Years since quittin.1   Smokeless Tobacco Current    Types: Snuff, Chew   Tobacco Comments    1/2 pack daily      Social History     Substance and Sexual Activity   Alcohol Use Not Currently       Family History   Problem Relation Age of Onset    Cancer Mother     Diabetes Maternal Grandmother        Past Surgical History:   Procedure Laterality Date    APPENDECTOMY           Allergies and Current Medications   Review of patient's allergies indicates:  No Known Allergies    Current Outpatient Medications:     HYDROcodone-acetaminophen (NORCO)  mg per tablet, Take 1 tablet by mouth every 6 (six) hours., Disp: 120 tablet, Rfl: 0    atenoloL (TENORMIN) 25 MG tablet, Take 1 tablet (25 mg total) by mouth once daily., Disp: 90 tablet, Rfl: 1    cyclobenzaprine (FLEXERIL) 10 MG tablet, Take 1 tablet (10 mg total) by mouth 3 (three) times daily., Disp: 270 tablet, Rfl: 1    EScitalopram oxalate (LEXAPRO) 20 MG tablet, Take 1 tablet (20 mg  total) by mouth once daily., Disp: 90 tablet, Rfl: 1    losartan-hydrochlorothiazide 50-12.5 mg (HYZAAR) 50-12.5 mg per tablet, Take 1 tablet by mouth once daily., Disp: 90 tablet, Rfl: 1    lovastatin (MEVACOR) 40 MG tablet, Take 1 tablet (40 mg total) by mouth nightly., Disp: 90 tablet, Rfl: 1    traZODone (DESYREL) 150 MG tablet, Take 1 tablet (150 mg total) by mouth every evening., Disp: 90 tablet, Rfl: 1    Health Risk Assessment   Fall Risk: No   Obesity: BMI 32.49     Advance Directive:  Does not have an advanced directive. Verbal education and written education included in today's AVS.   Depression: PHQ9  0   HTN:  yes, DASH diet, exercise, weight management, med compliance, home BP monitoring, and follow-up discussed.   T2DM: NA   Tobacco use: Former Smoker. Quit 1974. Current Smokeless User. Pt chews 1/2 pack daily. Pt instructed on tobacco cessation and written education given at [facility] today. Pt declined referral to [place] Quit[place] at this time and not ready to quit.   STI: NA    Alcohol misuse: Denies   Statin Use: Yes      Health Risk Assessment  What is your age?: 65-69  Are you male or female?: Male  During the past four weeks, how much have you been bothered by emotional problems such as feeling anxious, depressed, irritable, sad, or downhearted and blue?: Not at all  During the past five weeks, has your physical and/or emotional health limited your social activities with family, friends, neighbors, or groups?: Not at all  During the past four weeks, how much bodily pain have you generally had?: Moderate pain  During the past four weeks, was someone available to help if you needed and wanted help?: Yes, as much as I wanted  During the past four weeks, what was the hardest physical activity you could do for at least two minutes?: Moderate  Can you get to places out of walking distance without help?  (For example, can you travel alone on buses or taxis, or drive your own car?): Yes  Can you go  shopping for groceries or clothes without someone's help?: Yes  Can you prepare your own meals?: Yes  Can you do your own housework without help?: Yes  Because of any health problems, do you need the help of another person with your personal care needs such as eating, bathing, dressing, or getting around the house?: No  Can you handle your own money without help?: Yes  During the past four weeks, how would you rate your health in general?: Fair  How have things been going for you during the past four weeks?: Very well  Are you having difficulties driving your car?: No  Do you always fasten your seat belt when you are in a car?: Yes, usually  How often in the past four weeks have you been bothered by falling or dizzy when standing up?: Never  How often in the past four weeks have you been bothered by sexual problems?: Never  How often in the past four weeks have you been bothered by trouble eating well?: Never  How often in the past four weeks have you been bothered by teeth or denture problems?: Never  How often in the past four weeks have you been bothered with problems using the telephone?: Never  How often in the past four weeks have you been bothered by tiredness or fatigue?: Seldom  Have you fallen two or more times in the past year?: No  Are you afraid of falling?: No  Are you a smoker?: Yes, I'm not ready to quit  During the past four weeks, how many drinks of wine, beer, or other alcoholic beverages did you have?: No alcohol at all  Do you exercise for about 20 minutes three or more days a week?: Yes, most of the time  Have you been given any information to help you with hazards in your house that might hurt you?: No  Have you been given any information to help you with keeping track of your medications?: No  How often do you have trouble taking medicines the way you've been told to take them?: I always take them as prescribed  How confident are you that you can control and manage most of your health  problems?: Very confident  What is your race? (Check all that apply.):     Health Maintenance   Last eye exam: Pt reports has been years and declined referral today. Pt reports will set up his own appt when ready.   Last CV screen with lipids: 08/08/2022   Diabetes screening with fasting glucose or A1c: 02/11/2022   Colonoscopy: 2019. Pt reports had [place] and unsure of hospital name but told to repeat in 10 years and normal.   Flu Vaccine: Declined   Pneumonia vaccines: 13-11/10/2020, declined 23 or 20 today   COVID vaccine: Declined   Hep B vaccine: NA   DEXA: NA   Last pap/pelvic: NA   Last Mammogram: NA   Last PSA screen: 09/20/2021   AAA screening: Declined (once in lifetime for males 65-75 who have smoked > 100 cigarettes in lifetime)  HIV Screeing: NA  Hepatitis C Screen: Declined  Low Dose CT Scan: NA    Health Maintenance Topics with due status: Not Due       Topic Last Completion Date    TETANUS VACCINE 11/10/2020    Lipid Panel 08/08/2022     Health Maintenance Due   Topic Date Due    Sign Pain Contract  Never done    PROSTATE-SPECIFIC ANTIGEN  09/20/2022    Hemoglobin A1c (Prediabetes)  02/11/2023       Incontinence  Bowel: No  Bladder: No    Lab results available in Epic or see dates from [facility] above:   Lab Results   Component Value Date    CHOL 131 08/08/2022    CHOL 156 02/10/2022    CHOL 160 09/20/2021     Lab Results   Component Value Date    HDL 45 08/08/2022    HDL 49 02/10/2022    HDL 53 09/20/2021     Lab Results   Component Value Date    LDLCALC 68 08/08/2022    LDLCALC 90 02/10/2022    LDLCALC 87 09/20/2021     Lab Results   Component Value Date    TRIG 90 08/08/2022    TRIG 86 02/10/2022    TRIG 101 09/20/2021     Lab Results   Component Value Date    CHOLHDL 2.9 08/08/2022    CHOLHDL 3.2 02/10/2022    CHOLHDL 3.0 09/20/2021       Lab Results   Component Value Date    HGBA1C 5.8 02/11/2022       Sodium   Date Value Ref Range Status   08/08/2022 139 136 - 145 mmol/L Final  "    Potassium   Date Value Ref Range Status   08/08/2022 4.3 3.5 - 5.1 mmol/L Final     Chloride   Date Value Ref Range Status   08/08/2022 108 (H) 98 - 107 mmol/L Final     CO2   Date Value Ref Range Status   08/08/2022 27 21 - 32 mmol/L Final     Glucose   Date Value Ref Range Status   08/08/2022 113 (H) 74 - 106 mg/dL Final   02/09/2021 98 mg/dL Final     BUN   Date Value Ref Range Status   08/08/2022 22 (H) 7 - 18 mg/dL Final     Creatinine   Date Value Ref Range Status   08/08/2022 1.22 0.70 - 1.30 mg/dL Final     Calcium   Date Value Ref Range Status   08/08/2022 8.7 8.5 - 10.1 mg/dL Final     Total Protein   Date Value Ref Range Status   02/10/2022 7.1 6.4 - 8.2 g/dL Final     Albumin   Date Value Ref Range Status   02/10/2022 3.8 3.5 - 5.0 g/dL Final     Bilirubin, Total   Date Value Ref Range Status   02/10/2022 0.5 0.0 - 1.2 mg/dL Final     Alk Phos   Date Value Ref Range Status   02/10/2022 46 45 - 115 U/L Final     AST   Date Value Ref Range Status   02/10/2022 19 15 - 37 U/L Final     ALT   Date Value Ref Range Status   02/10/2022 30 16 - 61 U/L Final     Anion Gap   Date Value Ref Range Status   08/08/2022 8 7 - 16 mmol/L Final     eGFR   Date Value Ref Range Status   02/10/2022 54 (L) >=60 mL/min/1.73m² Final         Lab Results   Component Value Date    PSA 0.352 09/20/2021    (Delete this line if female pt)        Care Team   PCP: SOFIA Puente              Pain: Dr. Low      **See Completed Assessments for Annual Wellness visit within the encounter summary    The following assessments were completed & reviewed:  Depression Screening  Cognitive function Screening  Timed Get Up Test  Whisper Test  Vision Screen  Health Risk Assessment  Checklist of ADLs and IADLs      Objective  Vitals:    02/08/23 0828   BP: 122/80   Pulse: 72   Resp: 20   Temp: 98.1 °F (36.7 °C)   SpO2: 98%   Weight: 114.8 kg (253 lb 1 oz)   Height: 6' 2" (1.88 m)   PainSc:   7   PainLoc: Abdomen      Body mass index is " 32.49 kg/m².  Ideal body weight: 82.2 kg (181 lb 3.5 oz)       Physical Exam  Vitals and nursing note reviewed.   Constitutional:       Appearance: Normal appearance.   HENT:      Right Ear: Tympanic membrane, ear canal and external ear normal.      Left Ear: Tympanic membrane, ear canal and external ear normal.      Nose: Nose normal.      Mouth/Throat:      Mouth: Mucous membranes are moist.      Pharynx: Oropharynx is clear.   Eyes:      Conjunctiva/sclera: Conjunctivae normal.      Pupils: Pupils are equal, round, and reactive to light.   Cardiovascular:      Rate and Rhythm: Normal rate and regular rhythm.      Heart sounds: Normal heart sounds.   Pulmonary:      Effort: Pulmonary effort is normal.      Breath sounds: Normal breath sounds. No wheezing, rhonchi or rales.   Musculoskeletal:      Cervical back: Normal range of motion and neck supple. No rigidity or tenderness.   Lymphadenopathy:      Cervical: No cervical adenopathy.   Skin:     General: Skin is warm and dry.      Capillary Refill: Capillary refill takes less than 2 seconds.   Neurological:      General: No focal deficit present.      Mental Status: He is alert and oriented to person, place, and time.   Psychiatric:         Mood and Affect: Mood normal.         Behavior: Behavior normal.         Assessment:     1. Encounter for subsequent annual wellness visit (AWV) in Medicare patient    2. BMI 32.0-32.9,adult    3. Hyperlipidemia, unspecified hyperlipidemia type  - lovastatin (MEVACOR) 40 MG tablet; Take 1 tablet (40 mg total) by mouth nightly.  Dispense: 90 tablet; Refill: 1  - Lipid Panel; Future  - Lipid Panel    4. Hypertension, unspecified type  - losartan-hydrochlorothiazide 50-12.5 mg (HYZAAR) 50-12.5 mg per tablet; Take 1 tablet by mouth once daily.  Dispense: 90 tablet; Refill: 1  - atenoloL (TENORMIN) 25 MG tablet; Take 1 tablet (25 mg total) by mouth once daily.  Dispense: 90 tablet; Refill: 1  - Comprehensive Metabolic Panel;  Future  - Comprehensive Metabolic Panel    5. Cervical spondylosis without myelopathy  - cyclobenzaprine (FLEXERIL) 10 MG tablet; Take 1 tablet (10 mg total) by mouth 3 (three) times daily.  Dispense: 270 tablet; Refill: 1    6. Depression, unspecified depression type  - EScitalopram oxalate (LEXAPRO) 20 MG tablet; Take 1 tablet (20 mg total) by mouth once daily.  Dispense: 90 tablet; Refill: 1    7. Chronic pain syndrome    8. Insomnia, unspecified type  - traZODone (DESYREL) 150 MG tablet; Take 1 tablet (150 mg total) by mouth every evening.  Dispense: 90 tablet; Refill: 1    9. Encounter for screening for malignant neoplasm of prostate  - PSA, Screening; Future  - PSA, Screening       Problem List Items Addressed This Visit          Neuro    Cervical spondylosis without myelopathy (Chronic)    Current Assessment & Plan     Controlled with medication         Relevant Medications    cyclobenzaprine (FLEXERIL) 10 MG tablet    Chronic pain syndrome    Current Assessment & Plan     Symptoms stable controlled with medication followed by pain treatment            Psychiatric    Depression    Current Assessment & Plan     Stable tolerating medication well does not desire to discontinue at this time         Relevant Medications    EScitalopram oxalate (LEXAPRO) 20 MG tablet       Cardiac/Vascular    Hypertension    Current Assessment & Plan     Well controlled continue current medications follow low sodium diet and get regular physical activity         Relevant Medications    losartan-hydrochlorothiazide 50-12.5 mg (HYZAAR) 50-12.5 mg per tablet    atenoloL (TENORMIN) 25 MG tablet    Other Relevant Orders    Comprehensive Metabolic Panel     Other Visit Diagnoses       Encounter for subsequent annual wellness visit (AWV) in Medicare patient    -  Primary    BMI 32.0-32.9,adult        Hyperlipidemia, unspecified hyperlipidemia type        Relevant Medications    lovastatin (MEVACOR) 40 MG tablet    Other Relevant Orders     Lipid Panel    Insomnia, unspecified type        trazadone effective    Relevant Medications    traZODone (DESYREL) 150 MG tablet    Encounter for screening for malignant neoplasm of prostate        Relevant Orders    PSA, Screening                 Plan:  Encounter for subsequent annual wellness visit (AWV) in Medicare patient    BMI 32.0-32.9,adult    Hyperlipidemia, unspecified hyperlipidemia type  -     lovastatin (MEVACOR) 40 MG tablet; Take 1 tablet (40 mg total) by mouth nightly.  Dispense: 90 tablet; Refill: 1  -     Lipid Panel; Future; Expected date: 02/08/2023    Hypertension, unspecified type  -     losartan-hydrochlorothiazide 50-12.5 mg (HYZAAR) 50-12.5 mg per tablet; Take 1 tablet by mouth once daily.  Dispense: 90 tablet; Refill: 1  -     atenoloL (TENORMIN) 25 MG tablet; Take 1 tablet (25 mg total) by mouth once daily.  Dispense: 90 tablet; Refill: 1  -     Comprehensive Metabolic Panel; Future; Expected date: 02/08/2023    Cervical spondylosis without myelopathy  -     cyclobenzaprine (FLEXERIL) 10 MG tablet; Take 1 tablet (10 mg total) by mouth 3 (three) times daily.  Dispense: 270 tablet; Refill: 1    Depression, unspecified depression type  -     EScitalopram oxalate (LEXAPRO) 20 MG tablet; Take 1 tablet (20 mg total) by mouth once daily.  Dispense: 90 tablet; Refill: 1    Chronic pain syndrome    Insomnia, unspecified type  Comments:  trazadone effective  Orders:  -     traZODone (DESYREL) 150 MG tablet; Take 1 tablet (150 mg total) by mouth every evening.  Dispense: 90 tablet; Refill: 1    Encounter for screening for malignant neoplasm of prostate  -     PSA, Screening; Future; Expected date: 05/08/2023          Referrals:       Advised to call office if does not hear from anyone with referral appt within 2-3 weeks to check on status of referral. Voiced understanding.        Discussed and provided with a screening schedule and personal prevention plan in accordance with USPSTF age appropriate  recommendations and Medicare screening guidelines.   Education, counseling, and referrals were provided as needed.  After Visit Summary printed and given to patient which includes written education and a list of any referrals if indicated.     F/u plan for yearly AWV.    Signature:     SOFIA Fitzpatrick

## 2023-02-08 NOTE — PATIENT INSTRUCTIONS
Counseling and Referral of Other Preventative  (Italic type indicates deductible and co-insurance are waived)    Patient Name: Simon Voss  Today's Date: 2/8/2023    Health Maintenance         Date Due Completion Date    COVID-19 Vaccine (1) Never done ---    Sign Pain Contract Never done ---    Shingles Vaccine (1 of 2) Never done ---    Abdominal Aortic Aneurysm Screening Never done ---    Pneumococcal Vaccines (Age 65+) (2 - PPSV23 if available, else PCV20) 11/10/2021 11/10/2020    Influenza Vaccine (1) Never done ---    PROSTATE-SPECIFIC ANTIGEN 09/20/2022 9/20/2021    Override on 8/7/2020: Done    Hemoglobin A1c (Prediabetes) 02/11/2023 2/11/2022    Hepatitis C Screening 08/10/2023 (Originally 1955) ---    Colorectal Cancer Screening 10/01/2029 (Originally 1955) ---    Lipid Panel 08/08/2023 8/8/2022    Override on 8/7/2020: Done (chol: 151 tri: 90 hdl: 48 ldl: 85)    TETANUS VACCINE 11/10/2030 11/10/2020          No orders of the defined types were placed in this encounter.

## 2023-02-09 NOTE — PROGRESS NOTES
BUN Cr went up stay well hydrated avoid NSAIDS keep blood pressure well controlled repeat 3 month PSA normal and lipids controlled

## 2023-02-15 ENCOUNTER — OFFICE VISIT (OUTPATIENT)
Dept: PAIN MEDICINE | Facility: CLINIC | Age: 68
End: 2023-02-15
Payer: MEDICARE

## 2023-02-15 VITALS
SYSTOLIC BLOOD PRESSURE: 123 MMHG | BODY MASS INDEX: 32.47 KG/M2 | WEIGHT: 253 LBS | OXYGEN SATURATION: 98 % | RESPIRATION RATE: 18 BRPM | HEIGHT: 74 IN | HEART RATE: 79 BPM | DIASTOLIC BLOOD PRESSURE: 83 MMHG

## 2023-02-15 DIAGNOSIS — R10.84 GENERALIZED ABDOMINAL PAIN: Chronic | ICD-10-CM

## 2023-02-15 DIAGNOSIS — M79.10 MYALGIA: Chronic | ICD-10-CM

## 2023-02-15 DIAGNOSIS — Z79.899 ENCOUNTER FOR LONG-TERM (CURRENT) USE OF OTHER MEDICATIONS: Primary | ICD-10-CM

## 2023-02-15 LAB

## 2023-02-15 PROCEDURE — 99214 PR OFFICE/OUTPT VISIT, EST, LEVL IV, 30-39 MIN: ICD-10-PCS | Mod: S$PBB,,, | Performed by: PAIN MEDICINE

## 2023-02-15 PROCEDURE — 99214 OFFICE O/P EST MOD 30 MIN: CPT | Mod: S$PBB,,, | Performed by: PAIN MEDICINE

## 2023-02-15 PROCEDURE — 99214 OFFICE O/P EST MOD 30 MIN: CPT | Mod: PBBFAC | Performed by: PAIN MEDICINE

## 2023-02-15 PROCEDURE — 80305 DRUG TEST PRSMV DIR OPT OBS: CPT | Mod: PBBFAC | Performed by: PAIN MEDICINE

## 2023-02-15 PROCEDURE — G0481 DRUG TEST DEF 8-14 CLASSES: HCPCS | Performed by: PAIN MEDICINE

## 2023-02-15 RX ORDER — HYDROCODONE BITARTRATE AND ACETAMINOPHEN 10; 325 MG/1; MG/1
1 TABLET ORAL EVERY 6 HOURS PRN
Qty: 120 TABLET | Refills: 0 | Status: SHIPPED | OUTPATIENT
Start: 2023-03-17 | End: 2023-02-15 | Stop reason: CLARIF

## 2023-02-15 RX ORDER — HYDROCODONE BITARTRATE AND ACETAMINOPHEN 10; 325 MG/1; MG/1
1 TABLET ORAL EVERY 6 HOURS PRN
Qty: 120 TABLET | Refills: 0 | Status: SHIPPED | OUTPATIENT
Start: 2023-02-15 | End: 2023-02-15 | Stop reason: CLARIF

## 2023-02-15 RX ORDER — HYDROCODONE BITARTRATE AND ACETAMINOPHEN 10; 325 MG/1; MG/1
1 TABLET ORAL EVERY 6 HOURS PRN
Qty: 120 TABLET | Refills: 0 | Status: SHIPPED | OUTPATIENT
Start: 2023-02-15 | End: 2023-05-12 | Stop reason: SDUPTHER

## 2023-02-15 RX ORDER — HYDROCODONE BITARTRATE AND ACETAMINOPHEN 10; 325 MG/1; MG/1
1 TABLET ORAL EVERY 6 HOURS PRN
Qty: 120 TABLET | Refills: 0 | Status: SHIPPED | OUTPATIENT
Start: 2023-04-16 | End: 2023-02-15 | Stop reason: CLARIF

## 2023-02-15 NOTE — PROGRESS NOTES
Disclaimer: This note has been generated using voice-recognition software. There may be typographical errors that have been missed during proof-reading        Patient ID: Simon Voss is a 67 y.o. male.      Chief Complaint: Abdominal Pain and Joint Pain      67-year-old male returns for re-evaluation of chronic abdominal pain following mesh surgery and  a ruptured appendix with gangrenous bowel,  visceral scarring and neuropathic pain formation.  He has been maintained on #120 [place] for an extended period of time.  He is not a candidate for surgical intervention.  Physical therapy is not indicated.  His pain remains diffusely throughout the abdomen.  He denies any changes since his last office visit.  Norco is providing some relief.  His last refill was January 16, 2023 but is urine drug screen point care was negative for opiates,  therefore I will obtain a definitive UDS and await results prior to refilling additional opiates.            Pain Assessment  Pain Assessment: 0-10  Pain Score:   6  Pain Location: Abdomen  Pain Descriptors: Sharp  Pain Frequency: Intermittent  Pain Intervention(s): Home medication, Rest      A's of Opioid Risk Assessment  Activity:Patient can perform ADL.   Analgesia:Patients pain  controlled by current medication.   Adverse Effects: Patient denies constipation or sedation.  Aberrant Behavior:  reviewed with no aberrant drug seeking/taking behavior.      Patient denies any suicidal or homicidal ideations    Physical Therapy/Home Exercise: not applicable          Review of Systems   Constitutional: Negative.    HENT: Negative.     Eyes: Negative.    Respiratory: Negative.     Cardiovascular: Negative.    Gastrointestinal:  Positive for abdominal pain.   Endocrine: Negative.    Genitourinary: Negative.    Musculoskeletal: Negative.    Integumentary:  Negative.   Allergic/Immunologic: Negative.    Neurological: Negative.    Hematological: Negative.    Psychiatric/Behavioral:  Negative.             Past Medical History:   Diagnosis Date    Anxiety     Cervical spondylosis without myelopathy     Chronic pain syndrome     Depression     Generalized abdominal pain     High cholesterol     Hypertension      Past Surgical History:   Procedure Laterality Date    APPENDECTOMY       Social History     Socioeconomic History    Marital status:    Tobacco Use    Smoking status: Former     Packs/day: 0.50     Years: 14.00     Pack years: 7.00     Types: Cigarettes     Start date:      Quit date:      Years since quittin.1    Smokeless tobacco: Current     Types: Snuff, Chew    Tobacco comments:     1/2 pack daily   Substance and Sexual Activity    Alcohol use: Not Currently    Drug use: Yes     Types: Hydrocodone    Sexual activity: Not Currently     Family History   Problem Relation Age of Onset    Cancer Mother     Diabetes Maternal Grandmother      Review of patient's allergies indicates:  No Known Allergies      Objective:  Vitals:    02/15/23 1143   BP: 123/83   Pulse: 79   Resp: 18        Physical Exam  Vitals and nursing note reviewed.   Constitutional:       General: He is not in acute distress.     Appearance: Normal appearance. He is not ill-appearing, toxic-appearing or diaphoretic.   HENT:      Head: Normocephalic and atraumatic.      Nose: Nose normal.      Mouth/Throat:      Mouth: Mucous membranes are moist.   Eyes:      Extraocular Movements: Extraocular movements intact.      Pupils: Pupils are equal, round, and reactive to light.   Cardiovascular:      Rate and Rhythm: Normal rate and regular rhythm.      Heart sounds: Normal heart sounds.   Pulmonary:      Effort: Pulmonary effort is normal. No respiratory distress.      Breath sounds: Normal breath sounds. No stridor. No wheezing or rhonchi.   Abdominal:      General: Bowel sounds are normal.      Palpations: Abdomen is soft.   Musculoskeletal:         General: No swelling, tenderness or deformity. Normal range  of motion.      Cervical back: Normal and normal range of motion. No spasms or tenderness. No pain with movement. Normal range of motion.      Thoracic back: Normal.      Lumbar back: No spasms, tenderness or bony tenderness. Normal range of motion. Negative right straight leg raise test and negative left straight leg raise test. No scoliosis.      Right lower leg: No edema.      Left lower leg: No edema.   Skin:     General: Skin is warm.   Neurological:      General: No focal deficit present.      Mental Status: He is alert and oriented to person, place, and time. Mental status is at baseline.      Cranial Nerves: No cranial nerve deficit.      Sensory: Sensation is intact. No sensory deficit.      Motor: No weakness.      Coordination: Coordination normal.      Gait: Gait normal.      Deep Tendon Reflexes: Reflexes are normal and symmetric.   Psychiatric:         Mood and Affect: Mood normal.         Behavior: Behavior normal.         Assessment:      1. Encounter for long-term (current) use of other medications    2. Generalized abdominal pain    3. Myalgia          Plan:  1. reviewed  2.Addiction, Dependency, Tolerance, Opioid abuse-misuse, Death, Diversion Discussed. Overdose reversal drug Naloxone discussed.  3.Refill/Continue medications for pain control and function       Requested Prescriptions     Signed Prescriptions Disp Refills    HYDROcodone-acetaminophen (NORCO)  mg per tablet 120 tablet 0     Sig: Take 1 tablet by mouth every 6 (six) hours as needed for Pain.     4.Urine drug screen point of care obtained and consistent with prescribed medications and medication refill date    Orders Placed This Encounter   Procedures    Drug Screen Definitive 14, Urine     Standing Status:   Future     Number of Occurrences:   1     Standing Expiration Date:   4/15/2024     Order Specific Question:   Specimen Source     Answer:   Urine    Miscellaneous Test, Sendout CORDANT 14-Drug Urine Definitive  Confirmation     Standing Status:   Future     Number of Occurrences:   1     Standing Expiration Date:   4/16/2024     Order Specific Question:   What is the name of the test you wish to perform?     Answer:   CORDANT 14-Drug Urine Definitive Confirmation    POCT Urine Drug Screen Presump     Interpretive Information:     Negative:  No drug detected at the cut off level.   Positive:  This result represents presumptive positive for the   tested drug, other substances may yield a positive response other   than the analyte of interest. This result should be utilized for   diagnostic purpose only. Confirmation testing will be performed upon physician request only.         5. Patient is not a candidate for nerve block injections.  His pain is tolerable with prescribed opioids  6. Follow with NANDO Null in 3 months for re-evaluation and medication refill         report:  Reviewed and consistent with medication use as prescribed.      The total time spent for evaluation and management on 02/16/2023 including reviewing separately obtained history, performing a medically appropriate exam and evaluation, documenting clinical information in the health record, independently interpreting results and communicating them to the patient/family/caregiver, and ordering medications/tests/procedures was between 15-29 minutes.    The above plan and management options were discussed at length with patient. Patient is in agreement with the above and verbalized understanding. It will be communicated with the referring physician via electronic record, fax, or mail.

## 2023-02-21 LAB — BEAKER SEE SCANNED REPORT: NORMAL

## 2023-05-09 DIAGNOSIS — Z71.89 COMPLEX CARE COORDINATION: ICD-10-CM

## 2023-05-12 NOTE — PROGRESS NOTES
Subjective:         Patient ID: Simon Voss is a 67 y.o. male.    Chief Complaint: Abdominal Pain      Pain  This is a chronic problem. The current episode started more than 1 year ago. The problem occurs daily. The problem has been waxing and waning. Associated symptoms include abdominal pain, arthralgias and neck pain. Pertinent negatives include no chest pain, chills, diaphoresis, fatigue, fever, headaches, swollen glands, urinary symptoms or vertigo.   Review of Systems   Constitutional:  Negative for activity change, appetite change, chills, diaphoresis, fatigue and fever.   HENT:  Negative for drooling, ear discharge, ear pain, facial swelling, mouth sores, nosebleeds, trouble swallowing, voice change and goiter.    Eyes:  Negative for photophobia, pain, discharge, redness and visual disturbance.   Respiratory:  Negative for apnea, choking, chest tightness, shortness of breath, wheezing and stridor.    Cardiovascular:  Negative for chest pain, palpitations and leg swelling.   Gastrointestinal:  Positive for abdominal pain. Negative for abdominal distention, diarrhea, rectal pain and fecal incontinence.   Endocrine: Negative for cold intolerance, heat intolerance, polydipsia, polyphagia and polyuria.   Genitourinary:  Negative for bladder incontinence, dysuria, flank pain and frequency.   Musculoskeletal:  Positive for arthralgias, back pain, leg pain, neck pain and neck stiffness.   Integumentary:  Negative for color change and pallor.   Neurological:  Negative for dizziness, vertigo, seizures, syncope, facial asymmetry, speech difficulty, light-headedness, headaches, coordination difficulties, memory loss and coordination difficulties.   Hematological:  Negative for adenopathy. Does not bruise/bleed easily.   Psychiatric/Behavioral:  Negative for agitation, behavioral problems, confusion, decreased concentration, hallucinations, self-injury and suicidal ideas. The patient is not nervous/anxious and  "is not hyperactive.          Past Medical History:   Diagnosis Date    Anxiety     Cervical spondylosis without myelopathy     Chronic pain syndrome     Depression     Generalized abdominal pain     High cholesterol     Hypertension      Past Surgical History:   Procedure Laterality Date    APPENDECTOMY       Social History     Socioeconomic History    Marital status:    Tobacco Use    Smoking status: Former     Packs/day: 0.50     Years: 14.00     Pack years: 7.00     Types: Cigarettes     Start date:      Quit date:      Years since quittin.4    Smokeless tobacco: Current     Types: Snuff, Chew    Tobacco comments:     1/2 pack daily   Substance and Sexual Activity    Alcohol use: Not Currently    Drug use: Yes     Types: Hydrocodone    Sexual activity: Not Currently     Family History   Problem Relation Age of Onset    Cancer Mother     Diabetes Maternal Grandmother      Review of patient's allergies indicates:  No Known Allergies     Objective:  Vitals:    05/15/23 1019   BP: 134/78   Pulse: 79   Resp: 18   Weight: 112 kg (247 lb)   Height: 6' 2" (1.88 m)   PainSc:   7         Physical Exam  Vitals and nursing note reviewed. Exam conducted with a chaperone present.   Constitutional:       General: He is awake. He is not in acute distress.     Appearance: Normal appearance. He is not toxic-appearing or diaphoretic.   HENT:      Head: Normocephalic and atraumatic.      Nose: Nose normal.      Mouth/Throat:      Mouth: Mucous membranes are moist.      Pharynx: Oropharynx is clear.   Eyes:      Conjunctiva/sclera: Conjunctivae normal.      Pupils: Pupils are equal, round, and reactive to light.   Cardiovascular:      Rate and Rhythm: Normal rate.   Pulmonary:      Effort: Pulmonary effort is normal. No respiratory distress.   Abdominal:      Palpations: Abdomen is soft.      Tenderness: There is abdominal tenderness.   Musculoskeletal:      Cervical back: Normal range of motion and neck supple. "      Thoracic back: Tenderness present.      Lumbar back: Tenderness present. Decreased range of motion.   Skin:     General: Skin is warm and dry.   Neurological:      General: No focal deficit present.      Mental Status: He is alert and oriented to person, place, and time. Mental status is at baseline.      Cranial Nerves: No cranial nerve deficit (II-XII).   Psychiatric:         Mood and Affect: Mood normal.         Behavior: Behavior normal. Behavior is cooperative.         Thought Content: Thought content normal.         No image results found.       Office Visit on 02/15/2023   Component Date Value Ref Range Status    POC Amphetamines 02/15/2023 Negative  Negative, Inconclusive Final    POC Barbiturates 02/15/2023 Negative  Negative, Inconclusive Final    POC Benzodiazepines 02/15/2023 Negative  Negative, Inconclusive Final    POC Cocaine 02/15/2023 Negative  Negative, Inconclusive Final    POC THC 02/15/2023 Negative  Negative, Inconclusive Final    POC Methadone 02/15/2023 Negative  Negative, Inconclusive Final    POC Methamphetamine 02/15/2023 Negative  Negative, Inconclusive Final    POC Opiates 02/15/2023 Negative  Negative, Inconclusive Final    POC Oxycodone 02/15/2023 Negative  Negative, Inconclusive Final    POC Phencyclidine 02/15/2023 Negative  Negative, Inconclusive Final    POC Methylenedioxymethamphetamine * 02/15/2023 Negative  Negative, Inconclusive Final    POC Tricyclic Antidepressants 02/15/2023 Negative  Negative, Inconclusive Final    POC Buprenorphine 02/15/2023 Negative   Final     Acceptable 02/15/2023 Yes   Final    POC Temperature (Urine) 02/15/2023 94   Final    See Scanned Report 02/15/2023 See Scanned Result   Final   Office Visit on 02/08/2023   Component Date Value Ref Range Status    PSA Total 02/08/2023 0.367  <=4.000 ng/mL Final    Triglycerides 02/08/2023 86  35 - 150 mg/dL Final    Cholesterol 02/08/2023 161  0 - 200 mg/dL Final    HDL Cholesterol 02/08/2023  53  40 - 60 mg/dL Final    Cholesterol/HDL Ratio (Risk Factor) 02/08/2023 3.0   Final    Non-HDL 02/08/2023 108  mg/dL Final    LDL Calculated 02/08/2023 91  mg/dL Final    LDL/HDL 02/08/2023 1.7   Final    VLDL 02/08/2023 17  mg/dL Final    Sodium 02/08/2023 139  136 - 145 mmol/L Final    Potassium 02/08/2023 4.4  3.5 - 5.1 mmol/L Final    Chloride 02/08/2023 107  98 - 107 mmol/L Final    CO2 02/08/2023 29  21 - 32 mmol/L Final    Anion Gap 02/08/2023 7  7 - 16 mmol/L Final    Glucose 02/08/2023 103  74 - 106 mg/dL Final    BUN 02/08/2023 20 (H)  7 - 18 mg/dL Final    Creatinine 02/08/2023 1.32 (H)  0.70 - 1.30 mg/dL Final    BUN/Creatinine Ratio 02/08/2023 15  6 - 20 Final    Calcium 02/08/2023 9.3  8.5 - 10.1 mg/dL Final    Total Protein 02/08/2023 7.2  6.4 - 8.2 g/dL Final    Albumin 02/08/2023 4.0  3.5 - 5.0 g/dL Final    Globulin 02/08/2023 3.2  2.0 - 4.0 g/dL Final    A/G Ratio 02/08/2023 1.3   Final    Bilirubin, Total 02/08/2023 0.4  >0.0 - 1.2 mg/dL Final    Alk Phos 02/08/2023 52  45 - 115 U/L Final    ALT 02/08/2023 31  16 - 61 U/L Final    AST 02/08/2023 23  15 - 37 U/L Final    eGFR 02/08/2023 59 (L)  >=60 mL/min/1.73m² Final   Office Visit on 11/17/2022   Component Date Value Ref Range Status    POC Amphetamines 11/17/2022 Negative  Negative, Inconclusive Final    POC Barbiturates 11/17/2022 Negative  Negative, Inconclusive Final    POC Benzodiazepines 11/17/2022 Negative  Negative, Inconclusive Final    POC Cocaine 11/17/2022 Negative  Negative, Inconclusive Final    POC THC 11/17/2022 Negative  Negative, Inconclusive Final    POC Methadone 11/17/2022 Negative  Negative, Inconclusive Final    POC Methamphetamine 11/17/2022 Negative  Negative, Inconclusive Final    POC Opiates 11/17/2022 Presumptive Positive (A)  Negative, Inconclusive Final    POC Oxycodone 11/17/2022 Negative  Negative, Inconclusive Final    POC Phencyclidine 11/17/2022 Negative  Negative, Inconclusive Final    POC  Methylenedioxymethamphetamine * 11/17/2022 Negative  Negative, Inconclusive Final    POC Tricyclic Antidepressants 11/17/2022 Negative  Negative, Inconclusive Final    POC Buprenorphine 11/17/2022 Negative   Final     Acceptable 11/17/2022 Yes   Final    POC Temperature (Urine) 11/17/2022 92   Final         Orders Placed This Encounter   Procedures    POCT Urine Drug Screen Presump     Interpretive Information:     Negative:  No drug detected at the cut off level.   Positive:  This result represents presumptive positive for the   tested drug, other substances may yield a positive response other   than the analyte of interest. This result should be utilized for   diagnostic purpose only. Confirmation testing will be performed upon physician request only.          Requested Prescriptions     Signed Prescriptions Disp Refills    HYDROcodone-acetaminophen (NORCO)  mg per tablet 120 tablet 0     Sig: Take 1 tablet by mouth every 6 (six) hours as needed for Pain.    HYDROcodone-acetaminophen (NORCO)  mg per tablet 120 tablet 0     Sig: Take 1 tablet by mouth every 6 (six) hours as needed for Pain.    HYDROcodone-acetaminophen (NORCO)  mg per tablet 120 tablet 0     Sig: Take 1 tablet by mouth every 6 (six) hours as needed for Pain.       Assessment:     1. Generalized abdominal pain    2. Cervical spondylosis without myelopathy    3. Encounter for long-term (current) use of other medications         A's of Opioid Risk Assessment  Activity:Patient can perform ADL.   Analgesia:Patients pain is partially controlled by current medication. Patient has tried OTC medications such as Tylenol and Ibuprofen with out relief.   Adverse Effects: Patient denies constipation or sedation.  Aberrant Behavior:  reviewed with no aberrant drug seeking/taking behavior.  Overdose reversal drug naloxone discussed    Drug screen reviewed      Plan:    Narcan March 2023     Pharmacy closed on weekends    Can  pick his July prescription of July 14     Will use July 16 refill date next visit    Chronic abdominal pain After mesh placement after surgery    He states he is doing well current medication    He would like to continue with conservative management    Continue home exercise program as directed    Continue current medication    Follow-up 3 months    Dr. Kong, February 2024    Bring original prescription medication bottles/container/box with labels to each visit

## 2023-05-15 ENCOUNTER — OFFICE VISIT (OUTPATIENT)
Dept: PAIN MEDICINE | Facility: CLINIC | Age: 68
End: 2023-05-15
Payer: MEDICARE

## 2023-05-15 VITALS
DIASTOLIC BLOOD PRESSURE: 78 MMHG | HEIGHT: 74 IN | BODY MASS INDEX: 31.7 KG/M2 | WEIGHT: 247 LBS | SYSTOLIC BLOOD PRESSURE: 134 MMHG | HEART RATE: 79 BPM | RESPIRATION RATE: 18 BRPM

## 2023-05-15 DIAGNOSIS — R10.84 GENERALIZED ABDOMINAL PAIN: Primary | Chronic | ICD-10-CM

## 2023-05-15 DIAGNOSIS — M47.812 CERVICAL SPONDYLOSIS WITHOUT MYELOPATHY: Chronic | ICD-10-CM

## 2023-05-15 DIAGNOSIS — Z79.899 ENCOUNTER FOR LONG-TERM (CURRENT) USE OF OTHER MEDICATIONS: ICD-10-CM

## 2023-05-15 PROCEDURE — 99214 OFFICE O/P EST MOD 30 MIN: CPT | Mod: PBBFAC | Performed by: PHYSICIAN ASSISTANT

## 2023-05-15 PROCEDURE — 99214 OFFICE O/P EST MOD 30 MIN: CPT | Mod: S$PBB,,, | Performed by: PHYSICIAN ASSISTANT

## 2023-05-15 PROCEDURE — 80305 DRUG TEST PRSMV DIR OPT OBS: CPT | Mod: PBBFAC | Performed by: PHYSICIAN ASSISTANT

## 2023-05-15 PROCEDURE — 99214 PR OFFICE/OUTPT VISIT, EST, LEVL IV, 30-39 MIN: ICD-10-PCS | Mod: S$PBB,,, | Performed by: PHYSICIAN ASSISTANT

## 2023-05-15 RX ORDER — HYDROCODONE BITARTRATE AND ACETAMINOPHEN 10; 325 MG/1; MG/1
1 TABLET ORAL EVERY 6 HOURS PRN
Qty: 120 TABLET | Refills: 0 | Status: SHIPPED | OUTPATIENT
Start: 2023-07-14 | End: 2023-08-08 | Stop reason: SDUPTHER

## 2023-05-15 RX ORDER — HYDROCODONE BITARTRATE AND ACETAMINOPHEN 10; 325 MG/1; MG/1
1 TABLET ORAL EVERY 6 HOURS PRN
Qty: 120 TABLET | Refills: 0 | Status: SHIPPED | OUTPATIENT
Start: 2023-06-16 | End: 2023-08-08 | Stop reason: SDUPTHER

## 2023-05-15 RX ORDER — HYDROCODONE BITARTRATE AND ACETAMINOPHEN 10; 325 MG/1; MG/1
1 TABLET ORAL EVERY 6 HOURS PRN
Qty: 120 TABLET | Refills: 0 | Status: SHIPPED | OUTPATIENT
Start: 2023-05-17 | End: 2023-06-16

## 2023-06-06 ENCOUNTER — OFFICE VISIT (OUTPATIENT)
Dept: FAMILY MEDICINE | Facility: CLINIC | Age: 68
End: 2023-06-06
Payer: MEDICARE

## 2023-06-06 VITALS
WEIGHT: 250 LBS | TEMPERATURE: 98 F | HEIGHT: 74 IN | HEART RATE: 59 BPM | DIASTOLIC BLOOD PRESSURE: 79 MMHG | SYSTOLIC BLOOD PRESSURE: 119 MMHG | BODY MASS INDEX: 32.08 KG/M2 | OXYGEN SATURATION: 98 % | RESPIRATION RATE: 18 BRPM

## 2023-06-06 DIAGNOSIS — R73.03 PREDIABETES: ICD-10-CM

## 2023-06-06 DIAGNOSIS — E78.5 HYPERLIPIDEMIA, UNSPECIFIED HYPERLIPIDEMIA TYPE: ICD-10-CM

## 2023-06-06 DIAGNOSIS — F32.A DEPRESSION, UNSPECIFIED DEPRESSION TYPE: ICD-10-CM

## 2023-06-06 DIAGNOSIS — M47.812 CERVICAL SPONDYLOSIS WITHOUT MYELOPATHY: Chronic | ICD-10-CM

## 2023-06-06 DIAGNOSIS — I10 HYPERTENSION, UNSPECIFIED TYPE: Primary | ICD-10-CM

## 2023-06-06 LAB
ANION GAP SERPL CALCULATED.3IONS-SCNC: 2 MMOL/L (ref 7–16)
BASOPHILS # BLD AUTO: 0.1 K/UL (ref 0–0.2)
BASOPHILS NFR BLD AUTO: 1.4 % (ref 0–1)
BUN SERPL-MCNC: 26 MG/DL (ref 7–18)
BUN/CREAT SERPL: 20 (ref 6–20)
CALCIUM SERPL-MCNC: 8.9 MG/DL (ref 8.5–10.1)
CHLORIDE SERPL-SCNC: 108 MMOL/L (ref 98–107)
CK SERPL-CCNC: 316 U/L (ref 39–308)
CO2 SERPL-SCNC: 33 MMOL/L (ref 21–32)
CREAT SERPL-MCNC: 1.28 MG/DL (ref 0.7–1.3)
DIFFERENTIAL METHOD BLD: ABNORMAL
EGFR (NO RACE VARIABLE) (RUSH/TITUS): 61 ML/MIN/1.73M2
EOSINOPHIL # BLD AUTO: 0.36 K/UL (ref 0–0.5)
EOSINOPHIL NFR BLD AUTO: 4.9 % (ref 1–4)
ERYTHROCYTE [DISTWIDTH] IN BLOOD BY AUTOMATED COUNT: 12.6 % (ref 11.5–14.5)
EST. AVERAGE GLUCOSE BLD GHB EST-MCNC: 104 MG/DL
GLUCOSE SERPL-MCNC: 106 MG/DL (ref 74–106)
HBA1C MFR BLD HPLC: 5.7 % (ref 4.5–6.6)
HCT VFR BLD AUTO: 43.7 % (ref 40–54)
HGB BLD-MCNC: 14 G/DL (ref 13.5–18)
IMM GRANULOCYTES # BLD AUTO: 0.03 K/UL (ref 0–0.04)
IMM GRANULOCYTES NFR BLD: 0.4 % (ref 0–0.4)
LYMPHOCYTES # BLD AUTO: 2.19 K/UL (ref 1–4.8)
LYMPHOCYTES NFR BLD AUTO: 30 % (ref 27–41)
MCH RBC QN AUTO: 29.9 PG (ref 27–31)
MCHC RBC AUTO-ENTMCNC: 32 G/DL (ref 32–36)
MCV RBC AUTO: 93.4 FL (ref 80–96)
MONOCYTES # BLD AUTO: 0.69 K/UL (ref 0–0.8)
MONOCYTES NFR BLD AUTO: 9.5 % (ref 2–6)
MPC BLD CALC-MCNC: 11.3 FL (ref 9.4–12.4)
NEUTROPHILS # BLD AUTO: 3.92 K/UL (ref 1.8–7.7)
NEUTROPHILS NFR BLD AUTO: 53.8 % (ref 53–65)
NRBC # BLD AUTO: 0 X10E3/UL
NRBC, AUTO (.00): 0 %
PLATELET # BLD AUTO: 171 K/UL (ref 150–400)
POTASSIUM SERPL-SCNC: 4.2 MMOL/L (ref 3.5–5.1)
RBC # BLD AUTO: 4.68 M/UL (ref 4.6–6.2)
SODIUM SERPL-SCNC: 139 MMOL/L (ref 136–145)
WBC # BLD AUTO: 7.29 K/UL (ref 4.5–11)

## 2023-06-06 PROCEDURE — 82550 CK: ICD-10-PCS | Mod: ,,, | Performed by: CLINICAL MEDICAL LABORATORY

## 2023-06-06 PROCEDURE — 80048 BASIC METABOLIC PNL TOTAL CA: CPT | Mod: ,,, | Performed by: CLINICAL MEDICAL LABORATORY

## 2023-06-06 PROCEDURE — 99213 PR OFFICE/OUTPT VISIT, EST, LEVL III, 20-29 MIN: ICD-10-PCS | Mod: ,,, | Performed by: NURSE PRACTITIONER

## 2023-06-06 PROCEDURE — 83036 HEMOGLOBIN A1C: ICD-10-PCS | Mod: ,,, | Performed by: CLINICAL MEDICAL LABORATORY

## 2023-06-06 PROCEDURE — 99213 OFFICE O/P EST LOW 20 MIN: CPT | Mod: ,,, | Performed by: NURSE PRACTITIONER

## 2023-06-06 PROCEDURE — 82550 ASSAY OF CK (CPK): CPT | Mod: ,,, | Performed by: CLINICAL MEDICAL LABORATORY

## 2023-06-06 PROCEDURE — 80048 BASIC METABOLIC PANEL: ICD-10-PCS | Mod: ,,, | Performed by: CLINICAL MEDICAL LABORATORY

## 2023-06-06 PROCEDURE — 85025 COMPLETE CBC W/AUTO DIFF WBC: CPT | Mod: ,,, | Performed by: CLINICAL MEDICAL LABORATORY

## 2023-06-06 PROCEDURE — 85025 CBC WITH DIFFERENTIAL: ICD-10-PCS | Mod: ,,, | Performed by: CLINICAL MEDICAL LABORATORY

## 2023-06-06 PROCEDURE — 83036 HEMOGLOBIN GLYCOSYLATED A1C: CPT | Mod: ,,, | Performed by: CLINICAL MEDICAL LABORATORY

## 2023-06-06 RX ORDER — ESCITALOPRAM OXALATE 20 MG/1
20 TABLET ORAL DAILY
Qty: 90 TABLET | Refills: 1 | Status: SHIPPED | OUTPATIENT
Start: 2023-06-06 | End: 2023-12-11 | Stop reason: SDUPTHER

## 2023-06-06 RX ORDER — CYCLOBENZAPRINE HCL 10 MG
10 TABLET ORAL 3 TIMES DAILY
Qty: 270 TABLET | Refills: 1 | Status: SHIPPED | OUTPATIENT
Start: 2023-06-06

## 2023-06-06 RX ORDER — ATENOLOL 25 MG/1
25 TABLET ORAL DAILY
Qty: 90 TABLET | Refills: 1 | Status: SHIPPED | OUTPATIENT
Start: 2023-06-06 | End: 2023-12-11 | Stop reason: SDUPTHER

## 2023-06-06 RX ORDER — LOVASTATIN 40 MG/1
40 TABLET ORAL NIGHTLY
Qty: 90 TABLET | Refills: 1 | Status: SHIPPED | OUTPATIENT
Start: 2023-06-06 | End: 2023-12-11 | Stop reason: SDUPTHER

## 2023-06-06 RX ORDER — LOSARTAN POTASSIUM AND HYDROCHLOROTHIAZIDE 12.5; 5 MG/1; MG/1
1 TABLET ORAL DAILY
Qty: 90 TABLET | Refills: 1 | Status: SHIPPED | OUTPATIENT
Start: 2023-06-06 | End: 2023-12-11 | Stop reason: SDUPTHER

## 2023-06-06 NOTE — ASSESSMENT & PLAN NOTE
Blood pressure is controlled. Current medical regimen is effective;   Will adjust according to results and monitor.

## 2023-06-06 NOTE — PROGRESS NOTES
SOFIA Fitzpatrick   [facility]  [address]  [place] [address]     PATIENT NAME: Simon Voss  : 1955  DATE: 23  MRN: [id number]      Billing Provider: SOFIA Fitzpatrick  Level of Service:   Patient PCP Information       Provider PCP Type    SOFIA Fitzpatrick General            Reason for Visit / Chief Complaint: Hypertension, Hyperlipidemia, and Health Maintenance (Abdominal Aortic Aneurysm Screening Never done/Hemoglobin A1c (Prediabetes) due on 2023)   Health Maintenance Due   Topic Date Due    Abdominal Aortic Aneurysm Screening  Never done    Hemoglobin A1c (Prediabetes)  2023          Update PCP  Update Chief Complaint         History of Present Illness / Problem Focused Workflow     Simon Voss presents to the clinic follow up prediabetes hypertension hyperlipidemia and depression feeling well no complaints    Hemoglobin A1C   Date Value Ref Range Status   2022 5.8 4.5 - 6.6 % Final     Comment:       Normal:               <5.7%  Pre-Diabetic:       5.7% to 6.4%  Diabetic:             >6.4%  Diabetic Goal:     <7%        CMP  Sodium   Date Value Ref Range Status   2023 139 136 - 145 mmol/L Final     Potassium   Date Value Ref Range Status   2023 4.4 3.5 - 5.1 mmol/L Final     Chloride   Date Value Ref Range Status   2023 107 98 - 107 mmol/L Final     CO2   Date Value Ref Range Status   2023 29 21 - 32 mmol/L Final     Glucose   Date Value Ref Range Status   2023 103 74 - 106 mg/dL Final   2021 98 mg/dL Final     BUN   Date Value Ref Range Status   2023 20 (H) 7 - 18 mg/dL Final     Creatinine   Date Value Ref Range Status   2023 1.32 (H) 0.70 - 1.30 mg/dL Final     Calcium   Date Value Ref Range Status   2023 9.3 8.5 - 10.1 mg/dL Final     Total Protein   Date Value Ref Range Status   2023 7.2 6.4 - 8.2 g/dL Final     Albumin   Date Value Ref Range  Status   02/08/2023 4.0 3.5 - 5.0 g/dL Final     Bilirubin, Total   Date Value Ref Range Status   02/08/2023 0.4 >0.0 - 1.2 mg/dL Final     Alk Phos   Date Value Ref Range Status   02/08/2023 52 45 - 115 U/L Final     AST   Date Value Ref Range Status   02/08/2023 23 15 - 37 U/L Final     ALT   Date Value Ref Range Status   02/08/2023 31 16 - 61 U/L Final     Anion Gap   Date Value Ref Range Status   02/08/2023 7 7 - 16 mmol/L Final     eGFR   Date Value Ref Range Status   02/08/2023 59 (L) >=60 mL/min/1.73m² Final        Lab Results   Component Value Date    WBC 8.89 02/10/2022    RBC 5.28 02/10/2022    HGB 15.9 02/10/2022    HCT 48.1 02/10/2022    MCV 91.1 02/10/2022    MCH 30.1 02/10/2022    MCHC 33.1 02/10/2022    RDW 12.4 02/10/2022     02/10/2022    MPV 10.8 02/10/2022    LYMPH 26.2 (L) 02/10/2022    LYMPH 2.33 02/10/2022    MONO 8.8 (H) 02/10/2022    EOS 0.40 02/10/2022    BASO 0.11 02/10/2022    EOSINOPHIL 4.5 (H) 02/10/2022    BASOPHIL 1.2 (H) 02/10/2022        Lab Results   Component Value Date    CHOL 161 02/08/2023    CHOL 131 08/08/2022    CHOL 156 02/10/2022     Lab Results   Component Value Date    HDL 53 02/08/2023    HDL 45 08/08/2022    HDL 49 02/10/2022     Lab Results   Component Value Date    LDLCALC 91 02/08/2023    LDLCALC 68 08/08/2022    LDLCALC 90 02/10/2022     Lab Results   Component Value Date    TRIG 86 02/08/2023    TRIG 90 08/08/2022    TRIG 86 02/10/2022     Lab Results   Component Value Date    CHOLHDL 3.0 02/08/2023    CHOLHDL 2.9 08/08/2022    CHOLHDL 3.2 02/10/2022        Wt Readings from Last 3 Encounters:   06/06/23 0949 113.4 kg (250 lb)   05/15/23 1019 112 kg (247 lb)   02/15/23 1143 114.8 kg (253 lb)        BP Readings from Last 3 Encounters:   06/06/23 119/79   05/15/23 134/78   02/15/23 123/83        Review of Systems     Review of Systems   Constitutional:  Negative for appetite change, fatigue and fever.   Eyes:  Negative for visual disturbance.   Respiratory:   Negative for cough, chest tightness, shortness of breath and wheezing.    Cardiovascular:  Negative for chest pain, palpitations, leg swelling and claudication.   Gastrointestinal:  Positive for abdominal pain. Negative for change in bowel habit, nausea, vomiting and change in bowel habit.   Genitourinary:  Negative for difficulty urinating and frequency.   Neurological:  Negative for weakness and headaches.      Medical / Social / Family History     Past Medical History:   Diagnosis Date    Anxiety     Cervical spondylosis without myelopathy     Chronic pain syndrome     Depression     Generalized abdominal pain     High cholesterol     Hypertension        Past Surgical History:   Procedure Laterality Date    APPENDECTOMY         Social History    reports that he quit smoking about 49 years ago. His smoking use included cigarettes. He started smoking about 63 years ago. He has a 7.00 pack-year smoking history. His smokeless tobacco use includes snuff and chew. He reports that he does not currently use alcohol. He reports current drug use. Drug: Hydrocodone.    Family History  's family history includes Cancer in his mother; Diabetes in his maternal grandmother.    Medications and Allergies     Medications  Outpatient Medications Marked as Taking for the 6/6/23 encounter (Office Visit) with SOFIA Fitzpatrick   Medication Sig Dispense Refill    [START ON 6/16/2023] HYDROcodone-acetaminophen (NORCO)  mg per tablet Take 1 tablet by mouth every 6 (six) hours as needed for Pain. 120 tablet 0    [START ON 7/14/2023] HYDROcodone-acetaminophen (NORCO)  mg per tablet Take 1 tablet by mouth every 6 (six) hours as needed for Pain. 120 tablet 0    traZODone (DESYREL) 150 MG tablet Take 1 tablet (150 mg total) by mouth every evening. 90 tablet 1    [DISCONTINUED] atenoloL (TENORMIN) 25 MG tablet Take 1 tablet (25 mg total) by mouth once daily. 90 tablet 1    [DISCONTINUED] cyclobenzaprine (FLEXERIL)  "10 MG tablet Take 1 tablet (10 mg total) by mouth 3 (three) times daily. 270 tablet 1    [DISCONTINUED] EScitalopram oxalate (LEXAPRO) 20 MG tablet Take 1 tablet (20 mg total) by mouth once daily. 90 tablet 1    [DISCONTINUED] losartan-hydrochlorothiazide 50-12.5 mg (HYZAAR) 50-12.5 mg per tablet Take 1 tablet by mouth once daily. 90 tablet 1    [DISCONTINUED] lovastatin (MEVACOR) 40 MG tablet Take 1 tablet (40 mg total) by mouth nightly. 90 tablet 1       Allergies  Review of patient's allergies indicates:  No Known Allergies    Physical Examination     Vitals:    06/06/23 0949   BP: 119/79   BP Location: Left arm   Patient Position: Sitting   BP Method: Large (Automatic)   Pulse: (!) 59   Resp: 18   Temp: 97.9 °F (36.6 °C)   TempSrc: Oral   SpO2: 98%   Weight: 113.4 kg (250 lb)   Height: 6' 2" (1.88 m)      Physical Exam  Vitals and nursing note reviewed.   Constitutional:       Appearance: Normal appearance.   HENT:      Right Ear: Tympanic membrane, ear canal and external ear normal.      Left Ear: Tympanic membrane, ear canal and external ear normal.      Nose: Nose normal.      Mouth/Throat:      Mouth: Mucous membranes are moist.      Pharynx: Oropharynx is clear.   Eyes:      Conjunctiva/sclera: Conjunctivae normal.      Pupils: Pupils are equal, round, and reactive to light.   Cardiovascular:      Rate and Rhythm: Normal rate and regular rhythm.      Heart sounds: Normal heart sounds.   Pulmonary:      Effort: Pulmonary effort is normal.      Breath sounds: Normal breath sounds. No wheezing, rhonchi or rales.   Musculoskeletal:      Cervical back: Normal range of motion and neck supple. No rigidity or tenderness.   Lymphadenopathy:      Cervical: No cervical adenopathy.   Skin:     General: Skin is warm and dry.      Capillary Refill: Capillary refill takes less than 2 seconds.   Neurological:      General: No focal deficit present.      Mental Status: He is alert and oriented to person, place, and time. "   Psychiatric:         Mood and Affect: Mood normal.         Behavior: Behavior normal.        Assessment and Plan (including Health Maintenance)      Problem List  Smart Sets  Document Outside HM   :    Plan:     Patient Instructions   Will receheExercise 30 minutes daily  Try not to eat too much bread, pasta, rice, breaded tings, fried food  Sleep 7-9 hours a day as this will enhance your daily performance ck labs today continue lifestyle changes.   - work on diet, specifically cutting back bread, breaded things, cereal, pasta, potatoes, rice  - try to exercise at least 150min a week divided however you want  - if you can do weight, even very light weight do them  - keep an eye on sugars, fasting sugar goal , after eating no greater than 180  - A1C goal is around 7.0%  - continue current regiment and please note if any changes were made        Health Maintenance Due   Topic Date Due    Abdominal Aortic Aneurysm Screening  Never done    Hemoglobin A1c (Prediabetes)  02/11/2023       Problem List Items Addressed This Visit          Neuro    Cervical spondylosis without myelopathy (Chronic)    Relevant Medications    cyclobenzaprine (FLEXERIL) 10 MG tablet       Psychiatric    Depression    Current Assessment & Plan     Stable tolerating medication well does not desire to discontinue at this time         Relevant Medications    EScitalopram oxalate (LEXAPRO) 20 MG tablet       Cardiac/Vascular    Hypertension - Primary    Current Assessment & Plan     Blood pressure is controlled. Current medical regimen is effective;   Will adjust according to results and monitor.         Relevant Medications    losartan-hydrochlorothiazide 50-12.5 mg (HYZAAR) 50-12.5 mg per tablet    atenoloL (TENORMIN) 25 MG tablet    Other Relevant Orders    Basic Metabolic Panel    CBC Auto Differential       Endocrine    Prediabetes    Current Assessment & Plan     Labs today continue lifestyle modification         Relevant Orders     Hemoglobin A1C     Other Visit Diagnoses       Hyperlipidemia, unspecified hyperlipidemia type        Relevant Medications    lovastatin (MEVACOR) 40 MG tablet    Other Relevant Orders    CK          Hypertension, unspecified type  -     Basic Metabolic Panel; Future; Expected date: 06/06/2023  -     CBC Auto Differential; Future; Expected date: 06/06/2023  -     losartan-hydrochlorothiazide 50-12.5 mg (HYZAAR) 50-12.5 mg per tablet; Take 1 tablet by mouth once daily.  Dispense: 90 tablet; Refill: 1  -     atenoloL (TENORMIN) 25 MG tablet; Take 1 tablet (25 mg total) by mouth once daily.  Dispense: 90 tablet; Refill: 1    Hyperlipidemia, unspecified hyperlipidemia type  -     CK; Future; Expected date: 06/06/2023  -     lovastatin (MEVACOR) 40 MG tablet; Take 1 tablet (40 mg total) by mouth nightly.  Dispense: 90 tablet; Refill: 1    Depression, unspecified depression type  -     EScitalopram oxalate (LEXAPRO) 20 MG tablet; Take 1 tablet (20 mg total) by mouth once daily.  Dispense: 90 tablet; Refill: 1    Cervical spondylosis without myelopathy  -     cyclobenzaprine (FLEXERIL) 10 MG tablet; Take 1 tablet (10 mg total) by mouth 3 (three) times daily.  Dispense: 270 tablet; Refill: 1    Prediabetes  -     Hemoglobin A1C; Future; Expected date: 06/06/2023       Health Maintenance Topics with due status: Not Due       Topic Last Completion Date    TETANUS VACCINE 11/10/2020    PROSTATE-SPECIFIC ANTIGEN 02/08/2023    Lipid Panel 02/08/2023         Future Appointments   Date Time Provider Department Center   8/10/2023 10:30 AM NANDO Wagner [facility]   2/14/2024 10:00 AM AWV NURSE, [facility]        Follow up in about 6 months (around 12/6/2023).    Health Maintenance Due   Topic Date Due    Abdominal Aortic Aneurysm Screening  Never done    Hemoglobin A1c (Prediabetes)  02/11/2023        Signature:  SOFIA Fitzpatrick    Date of encounter: 6/6/23

## 2023-06-06 NOTE — PATIENT INSTRUCTIONS
Will receheExercise 30 minutes daily  Try not to eat too much bread, pasta, rice, breaded tings, fried food  Sleep 7-9 hours a day as this will enhance your daily performance ck labs today continue lifestyle changes.   - work on diet, specifically cutting back bread, breaded things, cereal, pasta, potatoes, rice  - try to exercise at least 150min a week divided however you want  - if you can do weight, even very light weight do them  - keep an eye on sugars, fasting sugar goal , after eating no greater than 180  - A1C goal is around 7.0%  - continue current regiment and please note if any changes were made

## 2023-08-08 NOTE — PROGRESS NOTES
Subjective:         Patient ID: Simno Voss is a 67 y.o. male.    Chief Complaint: Pelvic Pain (Right pelvic pain,neck,back, shoulders and elbows, knees and ankles)      Pain  This is a chronic problem. The current episode started more than 1 year ago. The problem occurs daily. The problem has been unchanged. Associated symptoms include abdominal pain, arthralgias and neck pain. Pertinent negatives include no chest pain, chills, diaphoresis, fever, headaches, swollen glands, urinary symptoms or vertigo.     Review of Systems   Constitutional:  Negative for activity change, appetite change, chills, diaphoresis and fever.   HENT:  Negative for drooling, ear discharge, ear pain, facial swelling, mouth sores, nosebleeds, trouble swallowing, voice change and goiter.    Eyes:  Negative for photophobia, pain, discharge, redness and visual disturbance.   Respiratory:  Negative for apnea, choking, chest tightness, shortness of breath, wheezing and stridor.    Cardiovascular:  Negative for chest pain, palpitations and leg swelling.   Gastrointestinal:  Positive for abdominal pain. Negative for abdominal distention, diarrhea, rectal pain and fecal incontinence.   Endocrine: Negative for cold intolerance, heat intolerance, polydipsia, polyphagia and polyuria.   Genitourinary:  Negative for bladder incontinence, dysuria, flank pain and frequency.   Musculoskeletal:  Positive for arthralgias, back pain, leg pain, neck pain and neck stiffness.   Integumentary:  Negative for color change and pallor.   Neurological:  Negative for dizziness, vertigo, seizures, syncope, facial asymmetry, speech difficulty, light-headedness, headaches, coordination difficulties, memory loss and coordination difficulties.   Hematological:  Negative for adenopathy. Does not bruise/bleed easily.   Psychiatric/Behavioral:  Negative for agitation, behavioral problems, confusion, decreased concentration, hallucinations, self-injury and suicidal  "ideas. The patient is not nervous/anxious and is not hyperactive.            Past Medical History:   Diagnosis Date    Anxiety     Cervical spondylosis without myelopathy     Chronic pain syndrome     Depression     Generalized abdominal pain     High cholesterol     Hypertension      Past Surgical History:   Procedure Laterality Date    APPENDECTOMY       Social History     Socioeconomic History    Marital status:    Tobacco Use    Smoking status: Former     Current packs/day: 0.00     Average packs/day: 0.5 packs/day for 14.0 years (7.0 ttl pk-yrs)     Types: Cigarettes     Start date:      Quit date:      Years since quittin.6    Smokeless tobacco: Current     Types: Snuff, Chew    Tobacco comments:     1/2 pack daily   Substance and Sexual Activity    Alcohol use: Not Currently    Drug use: Yes     Types: Hydrocodone    Sexual activity: Not Currently     Family History   Problem Relation Age of Onset    Cancer Mother     Diabetes Maternal Grandmother      Review of patient's allergies indicates:  No Known Allergies     Objective:  Vitals:    08/10/23 1035 08/10/23 1036   BP: (!) 140/88    Pulse: 66    Weight: 111 kg (244 lb 12.8 oz)    Height: 6' 2" (1.88 m)    PainSc:   7   7         Physical Exam  Vitals and nursing note reviewed. Exam conducted with a chaperone present.   Constitutional:       General: He is awake. He is not in acute distress.     Appearance: Normal appearance. He is not toxic-appearing or diaphoretic.   HENT:      Head: Normocephalic and atraumatic.      Nose: Nose normal.      Mouth/Throat:      Mouth: Mucous membranes are moist.      Pharynx: Oropharynx is clear.   Eyes:      Conjunctiva/sclera: Conjunctivae normal.      Pupils: Pupils are equal, round, and reactive to light.   Cardiovascular:      Rate and Rhythm: Normal rate.   Pulmonary:      Effort: Pulmonary effort is normal. No respiratory distress.   Abdominal:      Palpations: Abdomen is soft.      Tenderness: " There is abdominal tenderness.   Musculoskeletal:      Cervical back: Normal range of motion and neck supple.      Thoracic back: Tenderness present.      Lumbar back: Tenderness present. Decreased range of motion.   Skin:     General: Skin is warm and dry.   Neurological:      General: No focal deficit present.      Mental Status: He is alert and oriented to person, place, and time. Mental status is at baseline.      Cranial Nerves: No cranial nerve deficit (II-XII).   Psychiatric:         Mood and Affect: Mood normal.         Behavior: Behavior normal. Behavior is cooperative.         Thought Content: Thought content normal.           No image results found.       Office Visit on 06/06/2023   Component Date Value Ref Range Status    Sodium 06/06/2023 139  136 - 145 mmol/L Final    Potassium 06/06/2023 4.2  3.5 - 5.1 mmol/L Final    Chloride 06/06/2023 108 (H)  98 - 107 mmol/L Final    CO2 06/06/2023 33 (H)  21 - 32 mmol/L Final    Anion Gap 06/06/2023 2 (L)  7 - 16 mmol/L Final    Glucose 06/06/2023 106  74 - 106 mg/dL Final    BUN 06/06/2023 26 (H)  7 - 18 mg/dL Final    Creatinine 06/06/2023 1.28  0.70 - 1.30 mg/dL Final    BUN/Creatinine Ratio 06/06/2023 20  6 - 20 Final    Calcium 06/06/2023 8.9  8.5 - 10.1 mg/dL Final    eGFR 06/06/2023 61  >=60 mL/min/1.73m2 Final    CK 06/06/2023 316 (H)  39 - 308 U/L Final    Hemoglobin A1C 06/06/2023 5.7  4.5 - 6.6 % Final    Estimated Average Glucose 06/06/2023 104  mg/dL Final    WBC 06/06/2023 7.29  4.50 - 11.00 K/uL Final    RBC 06/06/2023 4.68  4.60 - 6.20 M/uL Final    Hemoglobin 06/06/2023 14.0  13.5 - 18.0 g/dL Final    Hematocrit 06/06/2023 43.7  40.0 - 54.0 % Final    MCV 06/06/2023 93.4  80.0 - 96.0 fL Final    MCH 06/06/2023 29.9  27.0 - 31.0 pg Final    MCHC 06/06/2023 32.0  32.0 - 36.0 g/dL Final    RDW 06/06/2023 12.6  11.5 - 14.5 % Final    Platelet Count 06/06/2023 171  150 - 400 K/uL Final    MPV 06/06/2023 11.3  9.4 - 12.4 fL Final    Neutrophils %  06/06/2023 53.8  53.0 - 65.0 % Final    Lymphocytes % 06/06/2023 30.0  27.0 - 41.0 % Final    Monocytes % 06/06/2023 9.5 (H)  2.0 - 6.0 % Final    Eosinophils % 06/06/2023 4.9 (H)  1.0 - 4.0 % Final    Basophils % 06/06/2023 1.4 (H)  0.0 - 1.0 % Final    Immature Granulocytes % 06/06/2023 0.4  0.0 - 0.4 % Final    nRBC, Auto 06/06/2023 0.0  <=0.0 % Final    Neutrophils, Abs 06/06/2023 3.92  1.80 - 7.70 K/uL Final    Lymphocytes, Absolute 06/06/2023 2.19  1.00 - 4.80 K/uL Final    Monocytes, Absolute 06/06/2023 0.69  0.00 - 0.80 K/uL Final    Eosinophils, Absolute 06/06/2023 0.36  0.00 - 0.50 K/uL Final    Basophils, Absolute 06/06/2023 0.10  0.00 - 0.20 K/uL Final    Immature Granulocytes, Absolute 06/06/2023 0.03  0.00 - 0.04 K/uL Final    nRBC, Absolute 06/06/2023 0.00  <=0.00 x10e3/uL Final    Diff Type 06/06/2023 Auto   Final   Office Visit on 05/15/2023   Component Date Value Ref Range Status    POC Amphetamines 05/15/2023 Negative  Negative, Inconclusive Final    POC Barbiturates 05/15/2023 Negative  Negative, Inconclusive Final    POC Benzodiazepines 05/15/2023 Negative  Negative, Inconclusive Final    POC Cocaine 05/15/2023 Negative  Negative, Inconclusive Final    POC THC 05/15/2023 Negative  Negative, Inconclusive Final    POC Methadone 05/15/2023 Negative  Negative, Inconclusive Final    POC Methamphetamine 05/15/2023 Negative  Negative, Inconclusive Final    POC Opiates 05/15/2023 Presumptive Positive (A)  Negative, Inconclusive Final    POC Oxycodone 05/15/2023 Negative  Negative, Inconclusive Final    POC Phencyclidine 05/15/2023 Negative  Negative, Inconclusive Final    POC Methylenedioxymethamphetamine * 05/15/2023 Negative  Negative, Inconclusive Final    POC Tricyclic Antidepressants 05/15/2023 Negative  Negative, Inconclusive Final    POC Buprenorphine 05/15/2023 Negative   Final     Acceptable 05/15/2023 Yes   Final    POC Temperature (Urine) 05/15/2023 92   Final   Office Visit on  02/15/2023   Component Date Value Ref Range Status    POC Amphetamines 02/15/2023 Negative  Negative, Inconclusive Final    POC Barbiturates 02/15/2023 Negative  Negative, Inconclusive Final    POC Benzodiazepines 02/15/2023 Negative  Negative, Inconclusive Final    POC Cocaine 02/15/2023 Negative  Negative, Inconclusive Final    POC THC 02/15/2023 Negative  Negative, Inconclusive Final    POC Methadone 02/15/2023 Negative  Negative, Inconclusive Final    POC Methamphetamine 02/15/2023 Negative  Negative, Inconclusive Final    POC Opiates 02/15/2023 Negative  Negative, Inconclusive Final    POC Oxycodone 02/15/2023 Negative  Negative, Inconclusive Final    POC Phencyclidine 02/15/2023 Negative  Negative, Inconclusive Final    POC Methylenedioxymethamphetamine * 02/15/2023 Negative  Negative, Inconclusive Final    POC Tricyclic Antidepressants 02/15/2023 Negative  Negative, Inconclusive Final    POC Buprenorphine 02/15/2023 Negative   Final     Acceptable 02/15/2023 Yes   Final    POC Temperature (Urine) 02/15/2023 94   Final    See Scanned Report 02/15/2023 See Scanned Result   Final         No orders of the defined types were placed in this encounter.      Requested Prescriptions     Signed Prescriptions Disp Refills    HYDROcodone-acetaminophen (NORCO)  mg per tablet 120 tablet 0     Sig: Take 1 tablet by mouth every 6 (six) hours as needed for Pain.    HYDROcodone-acetaminophen (NORCO)  mg per tablet 120 tablet 0     Sig: Take 1 tablet by mouth every 6 (six) hours as needed for Pain.    HYDROcodone-acetaminophen (NORCO)  mg per tablet 120 tablet 0     Sig: Take 1 tablet by mouth every 6 (six) hours as needed for Pain.       Assessment:     1. Generalized abdominal pain    2. Cervical spondylosis without myelopathy         A's of Opioid Risk Assessment  Activity:Patient can perform ADL.   Analgesia:Patients pain is partially controlled by current medication. Patient has tried OTC  medications such as Tylenol and Ibuprofen with out relief.   Adverse Effects: Patient denies constipation or sedation.  Aberrant Behavior:  reviewed with no aberrant drug seeking/taking behavior.  Overdose reversal drug naloxone discussed    Drug screen reviewed      Plan:    Narcan March 2023     Pharmacy closed on weekends    Can  October October 13    Use October 14 is refill date next visit      Chronic abdominal pain After mesh placement after surgery    He would like to continue with conservative management he states his current medications helping control his discomfort    Continue home exercise program as directed    Continue current medication    Follow-up 3 months    Dr. Kong, February 2024    Bring original prescription medication bottles/container/box with labels to each visit

## 2023-08-10 ENCOUNTER — OFFICE VISIT (OUTPATIENT)
Dept: PAIN MEDICINE | Facility: CLINIC | Age: 68
End: 2023-08-10
Payer: MEDICARE

## 2023-08-10 VITALS
SYSTOLIC BLOOD PRESSURE: 140 MMHG | BODY MASS INDEX: 31.42 KG/M2 | DIASTOLIC BLOOD PRESSURE: 88 MMHG | HEIGHT: 74 IN | HEART RATE: 66 BPM | WEIGHT: 244.81 LBS

## 2023-08-10 DIAGNOSIS — R10.84 GENERALIZED ABDOMINAL PAIN: Primary | Chronic | ICD-10-CM

## 2023-08-10 DIAGNOSIS — M47.812 CERVICAL SPONDYLOSIS WITHOUT MYELOPATHY: Chronic | ICD-10-CM

## 2023-08-10 DIAGNOSIS — Z79.899 ENCOUNTER FOR LONG-TERM (CURRENT) USE OF OTHER MEDICATIONS: ICD-10-CM

## 2023-08-10 PROCEDURE — 99999PBSHW POCT URINE DRUG SCREEN PRESUMP: ICD-10-PCS | Mod: PBBFAC,,,

## 2023-08-10 PROCEDURE — 99999PBSHW POCT URINE DRUG SCREEN PRESUMP: Mod: PBBFAC,,,

## 2023-08-10 PROCEDURE — 99214 OFFICE O/P EST MOD 30 MIN: CPT | Mod: S$PBB,,, | Performed by: PHYSICIAN ASSISTANT

## 2023-08-10 PROCEDURE — 80305 DRUG TEST PRSMV DIR OPT OBS: CPT | Mod: PBBFAC | Performed by: PHYSICIAN ASSISTANT

## 2023-08-10 PROCEDURE — 99213 OFFICE O/P EST LOW 20 MIN: CPT | Mod: PBBFAC | Performed by: PHYSICIAN ASSISTANT

## 2023-08-10 PROCEDURE — 99214 PR OFFICE/OUTPT VISIT, EST, LEVL IV, 30-39 MIN: ICD-10-PCS | Mod: S$PBB,,, | Performed by: PHYSICIAN ASSISTANT

## 2023-08-10 RX ORDER — HYDROCODONE BITARTRATE AND ACETAMINOPHEN 10; 325 MG/1; MG/1
1 TABLET ORAL EVERY 6 HOURS PRN
Qty: 120 TABLET | Refills: 0 | Status: SHIPPED | OUTPATIENT
Start: 2023-10-13 | End: 2023-11-07 | Stop reason: SDUPTHER

## 2023-08-10 RX ORDER — HYDROCODONE BITARTRATE AND ACETAMINOPHEN 10; 325 MG/1; MG/1
1 TABLET ORAL EVERY 6 HOURS PRN
Qty: 120 TABLET | Refills: 0 | Status: SHIPPED | OUTPATIENT
Start: 2023-09-14 | End: 2023-11-07 | Stop reason: SDUPTHER

## 2023-08-10 RX ORDER — HYDROCODONE BITARTRATE AND ACETAMINOPHEN 10; 325 MG/1; MG/1
1 TABLET ORAL EVERY 6 HOURS PRN
Qty: 120 TABLET | Refills: 0 | Status: SHIPPED | OUTPATIENT
Start: 2023-08-15 | End: 2023-09-14

## 2023-11-07 NOTE — PROGRESS NOTES
Subjective:         Patient ID: Simon Voss is a 67 y.o. male.    Chief Complaint: Abdominal Pain      Pain  This is a chronic problem. The current episode started more than 1 year ago. The problem occurs daily. The problem has been waxing and waning. Associated symptoms include abdominal pain, arthralgias and neck pain. Pertinent negatives include no chest pain, chills, diaphoresis, fever, headaches, swollen glands, urinary symptoms or vertigo.     Review of Systems   Constitutional:  Negative for activity change, appetite change, chills, diaphoresis and fever.   HENT:  Negative for drooling, ear discharge, ear pain, facial swelling, mouth sores, nosebleeds, trouble swallowing, voice change and goiter.    Eyes:  Negative for photophobia, pain, discharge, redness and visual disturbance.   Respiratory:  Negative for apnea, choking, chest tightness, shortness of breath, wheezing and stridor.    Cardiovascular:  Negative for chest pain, palpitations and leg swelling.   Gastrointestinal:  Positive for abdominal pain. Negative for abdominal distention, diarrhea, rectal pain and fecal incontinence.   Endocrine: Negative for cold intolerance, heat intolerance, polydipsia, polyphagia and polyuria.   Genitourinary:  Negative for bladder incontinence, dysuria, flank pain and frequency.   Musculoskeletal:  Positive for arthralgias, back pain, leg pain, neck pain and neck stiffness.   Integumentary:  Negative for color change and pallor.   Neurological:  Negative for dizziness, vertigo, seizures, syncope, facial asymmetry, speech difficulty, light-headedness, headaches, memory loss and coordination difficulties.   Hematological:  Negative for adenopathy. Does not bruise/bleed easily.   Psychiatric/Behavioral:  Negative for agitation, behavioral problems, confusion, decreased concentration, hallucinations, self-injury and suicidal ideas. The patient is not nervous/anxious and is not hyperactive.            Past Medical  "History:   Diagnosis Date    Anxiety     Cervical spondylosis without myelopathy     Chronic pain syndrome     Depression     Generalized abdominal pain     High cholesterol     Hypertension      Past Surgical History:   Procedure Laterality Date    APPENDECTOMY       Social History     Socioeconomic History    Marital status:    Tobacco Use    Smoking status: Former     Current packs/day: 0.00     Average packs/day: 0.5 packs/day for 14.0 years (7.0 ttl pk-yrs)     Types: Cigarettes     Start date:      Quit date:      Years since quittin.8    Smokeless tobacco: Current     Types: Snuff, Chew    Tobacco comments:     1/2 pack daily   Substance and Sexual Activity    Alcohol use: Not Currently    Drug use: Yes     Types: Hydrocodone    Sexual activity: Not Currently     Family History   Problem Relation Age of Onset    Cancer Mother     Diabetes Maternal Grandmother      Review of patient's allergies indicates:  No Known Allergies     Objective:  Vitals:    23 1021   BP: 115/78   Pulse: 61   Resp: 18   Weight: 110.2 kg (243 lb)   Height: 6' 2" (1.88 m)   PainSc:   4         Physical Exam  Vitals and nursing note reviewed. Exam conducted with a chaperone present.   Constitutional:       General: He is awake. He is not in acute distress.     Appearance: Normal appearance. He is not toxic-appearing or diaphoretic.   HENT:      Head: Normocephalic and atraumatic.      Nose: Nose normal.      Mouth/Throat:      Mouth: Mucous membranes are moist.      Pharynx: Oropharynx is clear.   Eyes:      Conjunctiva/sclera: Conjunctivae normal.      Pupils: Pupils are equal, round, and reactive to light.   Cardiovascular:      Rate and Rhythm: Normal rate.   Pulmonary:      Effort: Pulmonary effort is normal. No respiratory distress.   Abdominal:      Palpations: Abdomen is soft.      Tenderness: There is abdominal tenderness.   Musculoskeletal:      Cervical back: Normal range of motion and neck supple.    "   Thoracic back: Tenderness present.      Lumbar back: Tenderness present. Decreased range of motion.   Skin:     General: Skin is warm and dry.   Neurological:      General: No focal deficit present.      Mental Status: He is alert and oriented to person, place, and time. Mental status is at baseline.      Cranial Nerves: No cranial nerve deficit (II-XII).   Psychiatric:         Mood and Affect: Mood normal.         Behavior: Behavior normal. Behavior is cooperative.         Thought Content: Thought content normal.           No image results found.       Office Visit on 08/10/2023   Component Date Value Ref Range Status    POC Amphetamines 08/10/2023 Negative  Negative, Inconclusive Final    POC Barbiturates 08/10/2023 Negative  Negative, Inconclusive Final    POC Benzodiazepines 08/10/2023 Negative  Negative, Inconclusive Final    POC Cocaine 08/10/2023 Negative  Negative, Inconclusive Final    POC THC 08/10/2023 Negative  Negative, Inconclusive Final    POC Methadone 08/10/2023 Negative  Negative, Inconclusive Final    POC Methamphetamine 08/10/2023 Negative  Negative, Inconclusive Final    POC Opiates 08/10/2023 Presumptive Positive (A)  Negative, Inconclusive Final    POC Oxycodone 08/10/2023 Negative  Negative, Inconclusive Final    POC Phencyclidine 08/10/2023 Negative  Negative, Inconclusive Final    POC Methylenedioxymethamphetamine * 08/10/2023 Negative  Negative, Inconclusive Final    POC Tricyclic Antidepressants 08/10/2023 Negative  Negative, Inconclusive Final    POC Buprenorphine 08/10/2023 Negative   Final     Acceptable 08/10/2023 Yes   Final    POC Temperature (Urine) 08/10/2023 92   Final   Office Visit on 06/06/2023   Component Date Value Ref Range Status    Sodium 06/06/2023 139  136 - 145 mmol/L Final    Potassium 06/06/2023 4.2  3.5 - 5.1 mmol/L Final    Chloride 06/06/2023 108 (H)  98 - 107 mmol/L Final    CO2 06/06/2023 33 (H)  21 - 32 mmol/L Final    Anion Gap 06/06/2023 2  (L)  7 - 16 mmol/L Final    Glucose 06/06/2023 106  74 - 106 mg/dL Final    BUN 06/06/2023 26 (H)  7 - 18 mg/dL Final    Creatinine 06/06/2023 1.28  0.70 - 1.30 mg/dL Final    BUN/Creatinine Ratio 06/06/2023 20  6 - 20 Final    Calcium 06/06/2023 8.9  8.5 - 10.1 mg/dL Final    eGFR 06/06/2023 61  >=60 mL/min/1.73m2 Final    CK 06/06/2023 316 (H)  39 - 308 U/L Final    Hemoglobin A1C 06/06/2023 5.7  4.5 - 6.6 % Final    Estimated Average Glucose 06/06/2023 104  mg/dL Final    WBC 06/06/2023 7.29  4.50 - 11.00 K/uL Final    RBC 06/06/2023 4.68  4.60 - 6.20 M/uL Final    Hemoglobin 06/06/2023 14.0  13.5 - 18.0 g/dL Final    Hematocrit 06/06/2023 43.7  40.0 - 54.0 % Final    MCV 06/06/2023 93.4  80.0 - 96.0 fL Final    MCH 06/06/2023 29.9  27.0 - 31.0 pg Final    MCHC 06/06/2023 32.0  32.0 - 36.0 g/dL Final    RDW 06/06/2023 12.6  11.5 - 14.5 % Final    Platelet Count 06/06/2023 171  150 - 400 K/uL Final    MPV 06/06/2023 11.3  9.4 - 12.4 fL Final    Neutrophils % 06/06/2023 53.8  53.0 - 65.0 % Final    Lymphocytes % 06/06/2023 30.0  27.0 - 41.0 % Final    Monocytes % 06/06/2023 9.5 (H)  2.0 - 6.0 % Final    Eosinophils % 06/06/2023 4.9 (H)  1.0 - 4.0 % Final    Basophils % 06/06/2023 1.4 (H)  0.0 - 1.0 % Final    Immature Granulocytes % 06/06/2023 0.4  0.0 - 0.4 % Final    nRBC, Auto 06/06/2023 0.0  <=0.0 % Final    Neutrophils, Abs 06/06/2023 3.92  1.80 - 7.70 K/uL Final    Lymphocytes, Absolute 06/06/2023 2.19  1.00 - 4.80 K/uL Final    Monocytes, Absolute 06/06/2023 0.69  0.00 - 0.80 K/uL Final    Eosinophils, Absolute 06/06/2023 0.36  0.00 - 0.50 K/uL Final    Basophils, Absolute 06/06/2023 0.10  0.00 - 0.20 K/uL Final    Immature Granulocytes, Absolute 06/06/2023 0.03  0.00 - 0.04 K/uL Final    nRBC, Absolute 06/06/2023 0.00  <=0.00 x10e3/uL Final    Diff Type 06/06/2023 Auto   Final   Office Visit on 05/15/2023   Component Date Value Ref Range Status    POC Amphetamines 05/15/2023 Negative  Negative, Inconclusive  Final    POC Barbiturates 05/15/2023 Negative  Negative, Inconclusive Final    POC Benzodiazepines 05/15/2023 Negative  Negative, Inconclusive Final    POC Cocaine 05/15/2023 Negative  Negative, Inconclusive Final    POC THC 05/15/2023 Negative  Negative, Inconclusive Final    POC Methadone 05/15/2023 Negative  Negative, Inconclusive Final    POC Methamphetamine 05/15/2023 Negative  Negative, Inconclusive Final    POC Opiates 05/15/2023 Presumptive Positive (A)  Negative, Inconclusive Final    POC Oxycodone 05/15/2023 Negative  Negative, Inconclusive Final    POC Phencyclidine 05/15/2023 Negative  Negative, Inconclusive Final    POC Methylenedioxymethamphetamine * 05/15/2023 Negative  Negative, Inconclusive Final    POC Tricyclic Antidepressants 05/15/2023 Negative  Negative, Inconclusive Final    POC Buprenorphine 05/15/2023 Negative   Final     Acceptable 05/15/2023 Yes   Final    POC Temperature (Urine) 05/15/2023 92   Final         Orders Placed This Encounter   Procedures    POCT Urine Drug Screen Presump     Interpretive Information:     Negative:  No drug detected at the cut off level.   Positive:  This result represents presumptive positive for the   tested drug, other substances may yield a positive response other   than the analyte of interest. This result should be utilized for   diagnostic purpose only. Confirmation testing will be performed upon physician request only.          Requested Prescriptions     Signed Prescriptions Disp Refills    HYDROcodone-acetaminophen (NORCO)  mg per tablet 120 tablet 0     Sig: Take 1 tablet by mouth every 6 (six) hours as needed for Pain.    HYDROcodone-acetaminophen (NORCO)  mg per tablet 120 tablet 0     Sig: Take 1 tablet by mouth every 6 (six) hours as needed for Pain.    HYDROcodone-acetaminophen (NORCO)  mg per tablet 120 tablet 0     Sig: Take 1 tablet by mouth every 6 (six) hours as needed for Pain.       Assessment:     1.  Generalized abdominal pain    2. Cervical spondylosis without myelopathy    3. Encounter for long-term (current) use of other medications         A's of Opioid Risk Assessment  Activity:Patient can perform ADL.   Analgesia:Patients pain is partially controlled by current medication. Patient has tried OTC medications such as Tylenol and Ibuprofen with out relief.   Adverse Effects: Patient denies constipation or sedation.  Aberrant Behavior:  reviewed with no aberrant drug seeking/taking behavior.  Overdose reversal drug naloxone discussed    Drug screen reviewed      Plan:    Narcan March 2023     Pharmacy closed on weekends      Chronic abdominal pain After mesh placement after surgery    Has no new complaints he states current medications continue help with chronic discomfort like to continue current treatment plan    Continue home exercise program as directed    Continue current medication    Follow-up 3 months    Dr. Kong, February 2024    Bring original prescription medication bottles/container/box with labels to each visit

## 2023-11-09 ENCOUNTER — OFFICE VISIT (OUTPATIENT)
Dept: PAIN MEDICINE | Facility: CLINIC | Age: 68
End: 2023-11-09
Payer: MEDICARE

## 2023-11-09 VITALS
BODY MASS INDEX: 31.18 KG/M2 | SYSTOLIC BLOOD PRESSURE: 115 MMHG | HEART RATE: 61 BPM | WEIGHT: 243 LBS | HEIGHT: 74 IN | DIASTOLIC BLOOD PRESSURE: 78 MMHG | RESPIRATION RATE: 18 BRPM

## 2023-11-09 DIAGNOSIS — Z79.899 ENCOUNTER FOR LONG-TERM (CURRENT) USE OF OTHER MEDICATIONS: ICD-10-CM

## 2023-11-09 DIAGNOSIS — M47.812 CERVICAL SPONDYLOSIS WITHOUT MYELOPATHY: Chronic | ICD-10-CM

## 2023-11-09 DIAGNOSIS — R10.84 GENERALIZED ABDOMINAL PAIN: Primary | Chronic | ICD-10-CM

## 2023-11-09 PROCEDURE — 99214 OFFICE O/P EST MOD 30 MIN: CPT | Mod: PBBFAC | Performed by: PHYSICIAN ASSISTANT

## 2023-11-09 PROCEDURE — 99214 OFFICE O/P EST MOD 30 MIN: CPT | Mod: S$PBB,,, | Performed by: PHYSICIAN ASSISTANT

## 2023-11-09 PROCEDURE — 99999PBSHW POCT URINE DRUG SCREEN PRESUMP: ICD-10-PCS | Mod: PBBFAC,,,

## 2023-11-09 PROCEDURE — 80305 DRUG TEST PRSMV DIR OPT OBS: CPT | Mod: PBBFAC | Performed by: PHYSICIAN ASSISTANT

## 2023-11-09 PROCEDURE — 99214 PR OFFICE/OUTPT VISIT, EST, LEVL IV, 30-39 MIN: ICD-10-PCS | Mod: S$PBB,,, | Performed by: PHYSICIAN ASSISTANT

## 2023-11-09 PROCEDURE — 99999PBSHW POCT URINE DRUG SCREEN PRESUMP: Mod: PBBFAC,,,

## 2023-11-09 RX ORDER — HYDROCODONE BITARTRATE AND ACETAMINOPHEN 10; 325 MG/1; MG/1
1 TABLET ORAL EVERY 6 HOURS PRN
Qty: 120 TABLET | Refills: 0 | Status: SHIPPED | OUTPATIENT
Start: 2024-01-12 | End: 2024-02-06 | Stop reason: SDUPTHER

## 2023-11-09 RX ORDER — HYDROCODONE BITARTRATE AND ACETAMINOPHEN 10; 325 MG/1; MG/1
1 TABLET ORAL EVERY 6 HOURS PRN
Qty: 120 TABLET | Refills: 0 | Status: SHIPPED | OUTPATIENT
Start: 2023-11-13 | End: 2023-12-13

## 2023-11-09 RX ORDER — HYDROCODONE BITARTRATE AND ACETAMINOPHEN 10; 325 MG/1; MG/1
1 TABLET ORAL EVERY 6 HOURS PRN
Qty: 120 TABLET | Refills: 0 | Status: SHIPPED | OUTPATIENT
Start: 2023-12-13 | End: 2024-02-06 | Stop reason: SDUPTHER

## 2023-12-09 DIAGNOSIS — Z71.89 COMPLEX CARE COORDINATION: ICD-10-CM

## 2023-12-11 ENCOUNTER — OFFICE VISIT (OUTPATIENT)
Dept: FAMILY MEDICINE | Facility: CLINIC | Age: 68
End: 2023-12-11
Payer: MEDICARE

## 2023-12-11 VITALS
TEMPERATURE: 98 F | OXYGEN SATURATION: 98 % | DIASTOLIC BLOOD PRESSURE: 74 MMHG | SYSTOLIC BLOOD PRESSURE: 107 MMHG | RESPIRATION RATE: 18 BRPM | WEIGHT: 240 LBS | HEIGHT: 74 IN | BODY MASS INDEX: 30.8 KG/M2 | HEART RATE: 62 BPM

## 2023-12-11 DIAGNOSIS — F32.A DEPRESSION, UNSPECIFIED DEPRESSION TYPE: ICD-10-CM

## 2023-12-11 DIAGNOSIS — R73.09 ELEVATED GLUCOSE LEVEL: ICD-10-CM

## 2023-12-11 DIAGNOSIS — G47.00 INSOMNIA, UNSPECIFIED TYPE: ICD-10-CM

## 2023-12-11 DIAGNOSIS — I10 HYPERTENSION, UNSPECIFIED TYPE: Primary | ICD-10-CM

## 2023-12-11 DIAGNOSIS — E78.5 HYPERLIPIDEMIA, UNSPECIFIED HYPERLIPIDEMIA TYPE: ICD-10-CM

## 2023-12-11 PROCEDURE — 99214 PR OFFICE/OUTPT VISIT, EST, LEVL IV, 30-39 MIN: ICD-10-PCS | Mod: ,,,

## 2023-12-11 PROCEDURE — 99214 OFFICE O/P EST MOD 30 MIN: CPT | Mod: ,,,

## 2023-12-11 RX ORDER — ESCITALOPRAM OXALATE 20 MG/1
20 TABLET ORAL DAILY
Qty: 90 TABLET | Refills: 1 | Status: SHIPPED | OUTPATIENT
Start: 2023-12-11

## 2023-12-11 RX ORDER — LOVASTATIN 40 MG/1
40 TABLET ORAL NIGHTLY
Qty: 90 TABLET | Refills: 1 | Status: SHIPPED | OUTPATIENT
Start: 2023-12-11

## 2023-12-11 RX ORDER — LOSARTAN POTASSIUM AND HYDROCHLOROTHIAZIDE 12.5; 5 MG/1; MG/1
1 TABLET ORAL DAILY
Qty: 90 TABLET | Refills: 1 | Status: SHIPPED | OUTPATIENT
Start: 2023-12-11

## 2023-12-11 RX ORDER — TRAZODONE HYDROCHLORIDE 150 MG/1
150 TABLET ORAL NIGHTLY
Qty: 90 TABLET | Refills: 1 | Status: SHIPPED | OUTPATIENT
Start: 2023-12-11

## 2023-12-11 RX ORDER — ATENOLOL 25 MG/1
25 TABLET ORAL DAILY
Qty: 90 TABLET | Refills: 1 | Status: SHIPPED | OUTPATIENT
Start: 2023-12-11

## 2023-12-11 NOTE — PROGRESS NOTES
Subjective     Patient ID: Simon Voss is a 67 y.o. male.    Chief Complaint: Follow-up, Hypertension (6 month follow up. Patient is not fasting.), and Health Maintenance (Hepatitis C Screening Never done-Declined/Sign Pain Contract Never done-Declined/RSV Vaccine (Age 60+ and Pregnant patients)(1 - 1-dose 60+ series) Never done-Declined/Abdominal Aortic Aneurysm Screening Never done-Declined/-Influenza Vaccine(1) due on 09/01/2023-Declined/)      Pt here for medication refills and lab work. 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 The current episode started more than 1 year ago. The problem has been unchanged. Pertinent negatives include no change in bowel habit, chest pain, fatigue, headaches, joint swelling, nausea, rash, visual change or vomiting. Nothing aggravates the symptoms. He has tried nothing for the symptoms. The treatment provided no relief.   Hypertension  This is a chronic problem. The current episode started more than 1 year ago. The problem is unchanged. The problem is controlled. Pertinent negatives include no chest pain, headaches, orthopnea, palpitations, peripheral edema, PND or shortness of breath. There are no associated agents to hypertension. Risk factors for coronary artery disease include dyslipidemia, male gender, obesity and sedentary lifestyle. Past treatments include beta blockers, diuretics and calcium channel blockers. The current treatment provides significant improvement. There are no compliance problems.        Review of Systems   Constitutional:  Negative for activity change, appetite change and fatigue.   HENT:  Negative for trouble swallowing.    Eyes:  Negative for visual disturbance.   Respiratory:  Negative for chest tightness and shortness of breath.    Cardiovascular:  Negative for chest pain, palpitations, orthopnea and PND.   Gastrointestinal:  Negative  for change in bowel habit, nausea and vomiting.   Genitourinary:  Negative for difficulty urinating.   Musculoskeletal:  Negative for joint swelling.   Integumentary:  Negative for rash.   Neurological:  Negative for headaches.   Psychiatric/Behavioral:  The patient is not nervous/anxious.             Objective     Physical Exam  Vitals and nursing note reviewed.   Constitutional:       Appearance: Normal appearance. He is not ill-appearing.   HENT:      Head: Normocephalic.      Right Ear: Tympanic membrane, ear canal and external ear normal.      Left Ear: Tympanic membrane, ear canal and external ear normal.      Nose: Nose normal.      Mouth/Throat:      Mouth: Mucous membranes are moist.      Pharynx: Oropharynx is clear.   Eyes:      Extraocular Movements: Extraocular movements intact.      Conjunctiva/sclera: Conjunctivae normal.      Pupils: Pupils are equal, round, and reactive to light.   Cardiovascular:      Rate and Rhythm: Normal rate and regular rhythm.      Pulses: Normal pulses.      Heart sounds: Normal heart sounds.   Pulmonary:      Effort: Pulmonary effort is normal. No respiratory distress.      Breath sounds: Normal breath sounds.   Abdominal:      General: Bowel sounds are normal.      Palpations: Abdomen is soft.      Tenderness: There is no abdominal tenderness.   Musculoskeletal:         General: Normal range of motion.      Cervical back: Normal range of motion and neck supple.   Skin:     General: Skin is warm and dry.      Capillary Refill: Capillary refill takes less than 2 seconds.   Neurological:      General: No focal deficit present.      Mental Status: He is alert and oriented to person, place, and time.   Psychiatric:         Mood and Affect: Mood normal.         Behavior: Behavior normal.         Thought Content: Thought content normal.         Judgment: Judgment normal.              Assessment and Plan     1. Hypertension, unspecified type  Assessment & Plan:  Blood pressure is  controlled. Current medical regimen is effective;   Will adjust according to results and monitor.     Orders:  -     CBC Auto Differential; Future; Expected date: 12/11/2023  -     Comprehensive Metabolic Panel; Future; Expected date: 12/11/2023  -     atenoloL (TENORMIN) 25 MG tablet; Take 1 tablet (25 mg total) by mouth once daily.  Dispense: 90 tablet; Refill: 1  -     losartan-hydrochlorothiazide 50-12.5 mg (HYZAAR) 50-12.5 mg per tablet; Take 1 tablet by mouth once daily.  Dispense: 90 tablet; Refill: 1    2. Depression, unspecified depression type  Assessment & Plan:  Depression is controlled. Current medical regimen is effective;   Will adjust according to results and monitor.     Orders:  -     EScitalopram oxalate (LEXAPRO) 20 MG tablet; Take 1 tablet (20 mg total) by mouth once daily.  Dispense: 90 tablet; Refill: 1    3. Hyperlipidemia, unspecified hyperlipidemia type  Assessment & Plan:  Hyperlipidemia is controlled. Current medical regimen is effective;   Will adjust according to results and monitor.     Orders:  -     CBC Auto Differential; Future; Expected date: 12/11/2023  -     Lipid Panel; Future; Expected date: 12/11/2023  -     Comprehensive Metabolic Panel; Future; Expected date: 12/11/2023  -     lovastatin (MEVACOR) 40 MG tablet; Take 1 tablet (40 mg total) by mouth nightly.  Dispense: 90 tablet; Refill: 1  -     CK; Future; Expected date: 12/11/2023    4. Insomnia, unspecified type  Comments:  trazadone effective  Overview:  trazadone effective    Assessment & Plan:  Will continue current medication as prescribed. Symptoms under control     Orders:  -     traZODone (DESYREL) 150 MG tablet; Take 1 tablet (150 mg total) by mouth every evening.  Dispense: 90 tablet; Refill: 1               Follow up in about 6 months (around 6/11/2024), or if symptoms worsen or fail to improve.

## 2023-12-13 DIAGNOSIS — R73.09 ELEVATED GLUCOSE LEVEL: Primary | ICD-10-CM

## 2023-12-13 PROCEDURE — 83036 HEMOGLOBIN GLYCOSYLATED A1C: CPT | Mod: ,,, | Performed by: CLINICAL MEDICAL LABORATORY

## 2023-12-13 PROCEDURE — 83036 HEMOGLOBIN A1C: ICD-10-PCS | Mod: ,,, | Performed by: CLINICAL MEDICAL LABORATORY

## 2023-12-13 NOTE — ASSESSMENT & PLAN NOTE
Depression is controlled. Current medical regimen is effective;   Will adjust according to results and monitor.

## 2023-12-14 LAB
EST. AVERAGE GLUCOSE BLD GHB EST-MCNC: 114 MG/DL
HBA1C MFR BLD HPLC: 5.6 % (ref 4.5–6.6)

## 2024-02-06 NOTE — PROGRESS NOTES
Subjective:         Patient ID: Simon Voss is a 68 y.o. male.    Chief Complaint: Abdominal Pain      Pain  This is a chronic problem. The current episode started more than 1 year ago. The problem occurs daily. The problem has been unchanged. Associated symptoms include abdominal pain, arthralgias and neck pain. Pertinent negatives include no chest pain, chills, diaphoresis, fever, headaches, swollen glands, urinary symptoms or vertigo.     Review of Systems   Constitutional:  Negative for activity change, appetite change, chills, diaphoresis and fever.   HENT:  Negative for drooling, ear discharge, ear pain, facial swelling, mouth sores, nosebleeds, trouble swallowing, voice change and goiter.    Eyes:  Negative for photophobia, pain, discharge, redness and visual disturbance.   Respiratory:  Negative for apnea, choking, chest tightness, shortness of breath, wheezing and stridor.    Cardiovascular:  Negative for chest pain, palpitations and leg swelling.   Gastrointestinal:  Positive for abdominal pain. Negative for abdominal distention, diarrhea, rectal pain and fecal incontinence.   Endocrine: Negative for cold intolerance, heat intolerance, polydipsia, polyphagia and polyuria.   Genitourinary:  Negative for bladder incontinence, dysuria, flank pain and frequency.   Musculoskeletal:  Positive for arthralgias, back pain, leg pain, neck pain and neck stiffness.   Integumentary:  Negative for color change and pallor.   Neurological:  Negative for dizziness, vertigo, seizures, syncope, facial asymmetry, speech difficulty, light-headedness, headaches, memory loss and coordination difficulties.   Hematological:  Negative for adenopathy. Does not bruise/bleed easily.   Psychiatric/Behavioral:  Negative for agitation, behavioral problems, confusion, decreased concentration, hallucinations, self-injury and suicidal ideas. The patient is not nervous/anxious and is not hyperactive.            Past Medical History:  "  Diagnosis Date    Anxiety     Cervical spondylosis without myelopathy     Chronic pain syndrome     Depression     Generalized abdominal pain     High cholesterol     Hypertension      Past Surgical History:   Procedure Laterality Date    APPENDECTOMY       Social History     Socioeconomic History    Marital status:    Tobacco Use    Smoking status: Former     Current packs/day: 0.00     Average packs/day: 0.5 packs/day for 14.0 years (7.0 ttl pk-yrs)     Types: Cigarettes     Start date:      Quit date:      Years since quittin.1    Smokeless tobacco: Former     Types: Snuff, Chew     Quit date: 2023    Tobacco comments:     1/2 pack daily   Substance and Sexual Activity    Alcohol use: Not Currently    Drug use: Yes     Types: Hydrocodone    Sexual activity: Not Currently     Family History   Problem Relation Age of Onset    Cancer Mother     Diabetes Maternal Grandmother      Review of patient's allergies indicates:  No Known Allergies     Objective:  Vitals:    24 1059 24 1100   BP: 112/78    Pulse: 69    Resp: 18    SpO2: 98%    Weight: 108.9 kg (240 lb)    Height: 6' 2" (1.88 m)    PainSc:   7   7         Physical Exam  Vitals and nursing note reviewed. Exam conducted with a chaperone present.   Constitutional:       General: He is awake. He is not in acute distress.     Appearance: Normal appearance. He is not toxic-appearing or diaphoretic.   HENT:      Head: Normocephalic and atraumatic.      Nose: Nose normal.      Mouth/Throat:      Mouth: Mucous membranes are moist.      Pharynx: Oropharynx is clear.   Eyes:      Conjunctiva/sclera: Conjunctivae normal.      Pupils: Pupils are equal, round, and reactive to light.   Cardiovascular:      Rate and Rhythm: Normal rate.   Pulmonary:      Effort: Pulmonary effort is normal. No respiratory distress.   Abdominal:      Palpations: Abdomen is soft.      Tenderness: There is abdominal tenderness.   Musculoskeletal:      " Cervical back: Normal range of motion and neck supple.      Thoracic back: Tenderness present.      Lumbar back: Tenderness present. Decreased range of motion.   Skin:     General: Skin is warm and dry.   Neurological:      General: No focal deficit present.      Mental Status: He is alert and oriented to person, place, and time. Mental status is at baseline.      Cranial Nerves: No cranial nerve deficit (II-XII).   Psychiatric:         Mood and Affect: Mood normal.         Behavior: Behavior normal. Behavior is cooperative.         Thought Content: Thought content normal.           No image results found.       Lab Visit on 12/13/2023   Component Date Value Ref Range Status    Triglycerides 12/13/2023 99  35 - 150 mg/dL Final    Cholesterol 12/13/2023 172  0 - 200 mg/dL Final    HDL Cholesterol 12/13/2023 52  40 - 60 mg/dL Final    Cholesterol/HDL Ratio (Risk Factor) 12/13/2023 3.3   Final    Non-HDL 12/13/2023 120  mg/dL Final    LDL Calculated 12/13/2023 100  mg/dL Final    LDL/HDL 12/13/2023 1.9   Final    VLDL 12/13/2023 20  mg/dL Final    Sodium 12/13/2023 139  136 - 145 mmol/L Final    Potassium 12/13/2023 3.8  3.5 - 5.1 mmol/L Final    Chloride 12/13/2023 104  98 - 107 mmol/L Final    CO2 12/13/2023 28  21 - 32 mmol/L Final    Anion Gap 12/13/2023 11  7 - 16 mmol/L Final    Glucose 12/13/2023 128 (H)  74 - 106 mg/dL Final    BUN 12/13/2023 20 (H)  7 - 18 mg/dL Final    Creatinine 12/13/2023 1.39 (H)  0.70 - 1.30 mg/dL Final    BUN/Creatinine Ratio 12/13/2023 14  6 - 20 Final    Calcium 12/13/2023 9.3  8.5 - 10.1 mg/dL Final    Total Protein 12/13/2023 7.6  6.4 - 8.2 g/dL Final    Albumin 12/13/2023 4.2  3.5 - 5.0 g/dL Final    Globulin 12/13/2023 3.4  2.0 - 4.0 g/dL Final    A/G Ratio 12/13/2023 1.2   Final    Bilirubin, Total 12/13/2023 0.4  >0.0 - 1.2 mg/dL Final    Alk Phos 12/13/2023 54  45 - 115 U/L Final    ALT 12/13/2023 27  16 - 61 U/L Final    AST 12/13/2023 15  15 - 37 U/L Final    eGFR 12/13/2023  55 (L)  >=60 mL/min/1.73m2 Final    CK 12/13/2023 202  39 - 308 U/L Final    WBC 12/13/2023 7.44  4.50 - 11.00 K/uL Final    RBC 12/13/2023 5.60  4.60 - 6.20 M/uL Final    Hemoglobin 12/13/2023 17.0  13.5 - 18.0 g/dL Final    Hematocrit 12/13/2023 51.5  40.0 - 54.0 % Final    MCV 12/13/2023 92.0  80.0 - 96.0 fL Final    MCH 12/13/2023 30.4  27.0 - 31.0 pg Final    MCHC 12/13/2023 33.0  32.0 - 36.0 g/dL Final    RDW 12/13/2023 12.2  11.5 - 14.5 % Final    Platelet Count 12/13/2023 201  150 - 400 K/uL Final    MPV 12/13/2023 10.8  9.4 - 12.4 fL Final    Neutrophils % 12/13/2023 48.2 (L)  53.0 - 65.0 % Final    Lymphocytes % 12/13/2023 36.8  27.0 - 41.0 % Final    Monocytes % 12/13/2023 7.7 (H)  2.0 - 6.0 % Final    Eosinophils % 12/13/2023 5.6 (H)  1.0 - 4.0 % Final    Basophils % 12/13/2023 1.3 (H)  0.0 - 1.0 % Final    Immature Granulocytes % 12/13/2023 0.4  0.0 - 0.4 % Final    nRBC, Auto 12/13/2023 0.0  <=0.0 % Final    Neutrophils, Abs 12/13/2023 3.58  1.80 - 7.70 K/uL Final    Lymphocytes, Absolute 12/13/2023 2.74  1.00 - 4.80 K/uL Final    Monocytes, Absolute 12/13/2023 0.57  0.00 - 0.80 K/uL Final    Eosinophils, Absolute 12/13/2023 0.42  0.00 - 0.50 K/uL Final    Basophils, Absolute 12/13/2023 0.10  0.00 - 0.20 K/uL Final    Immature Granulocytes, Absolute 12/13/2023 0.03  0.00 - 0.04 K/uL Final    nRBC, Absolute 12/13/2023 0.00  <=0.00 x10e3/uL Final    Diff Type 12/13/2023 Auto   Final   Office Visit on 12/11/2023   Component Date Value Ref Range Status    Hemoglobin A1C 12/13/2023 5.6  4.5 - 6.6 % Final    Estimated Average Glucose 12/13/2023 114  mg/dL Final   Office Visit on 11/09/2023   Component Date Value Ref Range Status    POC Amphetamines 11/09/2023 Negative  Negative, Inconclusive Final    POC Barbiturates 11/09/2023 Negative  Negative, Inconclusive Final    POC Benzodiazepines 11/09/2023 Negative  Negative, Inconclusive Final    POC Cocaine 11/09/2023 Negative  Negative, Inconclusive Final    POC  THC 11/09/2023 Negative  Negative, Inconclusive Final    POC Methadone 11/09/2023 Negative  Negative, Inconclusive Final    POC Methamphetamine 11/09/2023 Negative  Negative, Inconclusive Final    POC Opiates 11/09/2023 Presumptive Positive (A)  Negative, Inconclusive Final    POC Oxycodone 11/09/2023 Negative  Negative, Inconclusive Final    POC Phencyclidine 11/09/2023 Negative  Negative, Inconclusive Final    POC Methylenedioxymethamphetamine * 11/09/2023 Negative  Negative, Inconclusive Final    POC Tricyclic Antidepressants 11/09/2023 Negative  Negative, Inconclusive Final    POC Buprenorphine 11/09/2023 Negative   Final     Acceptable 11/09/2023 Yes   Final    POC Temperature (Urine) 11/09/2023 92   Final   Office Visit on 08/10/2023   Component Date Value Ref Range Status    POC Amphetamines 08/10/2023 Negative  Negative, Inconclusive Final    POC Barbiturates 08/10/2023 Negative  Negative, Inconclusive Final    POC Benzodiazepines 08/10/2023 Negative  Negative, Inconclusive Final    POC Cocaine 08/10/2023 Negative  Negative, Inconclusive Final    POC THC 08/10/2023 Negative  Negative, Inconclusive Final    POC Methadone 08/10/2023 Negative  Negative, Inconclusive Final    POC Methamphetamine 08/10/2023 Negative  Negative, Inconclusive Final    POC Opiates 08/10/2023 Presumptive Positive (A)  Negative, Inconclusive Final    POC Oxycodone 08/10/2023 Negative  Negative, Inconclusive Final    POC Phencyclidine 08/10/2023 Negative  Negative, Inconclusive Final    POC Methylenedioxymethamphetamine * 08/10/2023 Negative  Negative, Inconclusive Final    POC Tricyclic Antidepressants 08/10/2023 Negative  Negative, Inconclusive Final    POC Buprenorphine 08/10/2023 Negative   Final     Acceptable 08/10/2023 Yes   Final    POC Temperature (Urine) 08/10/2023 92   Final         Orders Placed This Encounter   Procedures    POCT Urine Drug Screen Presump     Interpretive Information:      Negative:  No drug detected at the cut off level.   Positive:  This result represents presumptive positive for the   tested drug, other substances may yield a positive response other   than the analyte of interest. This result should be utilized for   diagnostic purpose only. Confirmation testing will be performed upon physician request only.          Requested Prescriptions     Signed Prescriptions Disp Refills    HYDROcodone-acetaminophen (NORCO)  mg per tablet 120 tablet 0     Sig: Take 1 tablet by mouth every 6 (six) hours as needed for Pain.    HYDROcodone-acetaminophen (NORCO)  mg per tablet 120 tablet 0     Sig: Take 1 tablet by mouth every 6 (six) hours as needed for Pain.    HYDROcodone-acetaminophen (NORCO)  mg per tablet 120 tablet 0     Sig: Take 1 tablet by mouth every 6 (six) hours as needed for Pain.       Assessment:     1. Generalized abdominal pain    2. Cervical spondylosis without myelopathy    3. Myalgia    4. Encounter for long-term (current) use of other medications         A's of Opioid Risk Assessment  Activity:Patient can perform ADL.   Analgesia:Patients pain is partially controlled by current medication. Patient has tried OTC medications such as Tylenol and Ibuprofen with out relief.   Adverse Effects: Patient denies constipation or sedation.  Aberrant Behavior:  reviewed with no aberrant drug seeking/taking behavior.  Overdose reversal drug naloxone discussed    Drug screen reviewed      Plan:    Narcan March 2023     Pharmacy closed on weekends      Chronic abdominal pain After mesh placement after surgery    He states current medications helping with his chronic discomfort    He would like to continue current treatment    Continue home exercise program as directed    Continue current medication    Follow-up 3 months    Dr. Kong, February 2024    Bring original prescription medication bottles/container/box with labels to each visit

## 2024-02-08 ENCOUNTER — OFFICE VISIT (OUTPATIENT)
Dept: PAIN MEDICINE | Facility: CLINIC | Age: 69
End: 2024-02-08
Payer: MEDICARE

## 2024-02-08 VITALS
HEART RATE: 69 BPM | RESPIRATION RATE: 18 BRPM | SYSTOLIC BLOOD PRESSURE: 112 MMHG | WEIGHT: 240 LBS | HEIGHT: 74 IN | OXYGEN SATURATION: 98 % | DIASTOLIC BLOOD PRESSURE: 78 MMHG | BODY MASS INDEX: 30.8 KG/M2

## 2024-02-08 DIAGNOSIS — R10.84 GENERALIZED ABDOMINAL PAIN: Primary | Chronic | ICD-10-CM

## 2024-02-08 DIAGNOSIS — M79.10 MYALGIA: ICD-10-CM

## 2024-02-08 DIAGNOSIS — Z79.899 ENCOUNTER FOR LONG-TERM (CURRENT) USE OF OTHER MEDICATIONS: ICD-10-CM

## 2024-02-08 DIAGNOSIS — M47.812 CERVICAL SPONDYLOSIS WITHOUT MYELOPATHY: Chronic | ICD-10-CM

## 2024-02-08 PROCEDURE — 80305 DRUG TEST PRSMV DIR OPT OBS: CPT | Mod: PBBFAC | Performed by: PHYSICIAN ASSISTANT

## 2024-02-08 PROCEDURE — 99214 OFFICE O/P EST MOD 30 MIN: CPT | Mod: S$PBB,,, | Performed by: PHYSICIAN ASSISTANT

## 2024-02-08 PROCEDURE — 99999PBSHW POCT URINE DRUG SCREEN PRESUMP: Mod: PBBFAC,,,

## 2024-02-08 PROCEDURE — 99214 OFFICE O/P EST MOD 30 MIN: CPT | Mod: PBBFAC | Performed by: PHYSICIAN ASSISTANT

## 2024-02-08 RX ORDER — HYDROCODONE BITARTRATE AND ACETAMINOPHEN 10; 325 MG/1; MG/1
1 TABLET ORAL EVERY 6 HOURS PRN
Qty: 120 TABLET | Refills: 0 | Status: SHIPPED | OUTPATIENT
Start: 2024-04-11 | End: 2024-05-11

## 2024-02-08 RX ORDER — HYDROCODONE BITARTRATE AND ACETAMINOPHEN 10; 325 MG/1; MG/1
1 TABLET ORAL EVERY 6 HOURS PRN
Qty: 120 TABLET | Refills: 0 | Status: SHIPPED | OUTPATIENT
Start: 2024-03-12 | End: 2024-04-11

## 2024-02-08 RX ORDER — HYDROCODONE BITARTRATE AND ACETAMINOPHEN 10; 325 MG/1; MG/1
1 TABLET ORAL EVERY 6 HOURS PRN
Qty: 120 TABLET | Refills: 0 | Status: SHIPPED | OUTPATIENT
Start: 2024-02-09 | End: 2024-03-10

## 2024-02-12 NOTE — PROGRESS NOTES
"    Simon Voss presented for a  Medicare AWV and comprehensive Health Risk Assessment today. The following components were reviewed and updated:    Medical history  Family History  Social history  Allergies and Current Medications  Health Risk Assessment  Health Maintenance  Care Team         ** See Completed Assessments for Annual Wellness Visit within the encounter summary.**         The following assessments were completed:  Living Situation  CAGE  Depression Screening  Timed Get Up and Go  Whisper Test  Cognitive Function Screening  Nutrition Screening  ADL Screening  PAQ Screening        Vitals:    02/14/24 0947   BP: 106/72   BP Location: Left arm   Patient Position: Sitting   Pulse: 68   Resp: 20   Temp: 98.4 °F (36.9 °C)   SpO2: 99%   Weight: 107.5 kg (237 lb)   Height: 6' 2" (1.88 m)     Body mass index is 30.43 kg/m².  Physical Exam  Vitals and nursing note reviewed.   Constitutional:       Appearance: Normal appearance. He is not ill-appearing.   HENT:      Head: Normocephalic.      Right Ear: Tympanic membrane, ear canal and external ear normal.      Left Ear: Tympanic membrane, ear canal and external ear normal.      Nose: Nose normal.      Mouth/Throat:      Mouth: Mucous membranes are moist.      Pharynx: Oropharynx is clear.   Eyes:      Extraocular Movements: Extraocular movements intact.      Conjunctiva/sclera: Conjunctivae normal.      Pupils: Pupils are equal, round, and reactive to light.   Cardiovascular:      Rate and Rhythm: Normal rate and regular rhythm.      Pulses: Normal pulses.      Heart sounds: Normal heart sounds.   Pulmonary:      Effort: Pulmonary effort is normal. No respiratory distress.      Breath sounds: Normal breath sounds.   Abdominal:      General: Bowel sounds are normal.      Palpations: Abdomen is soft.      Tenderness: There is no abdominal tenderness.   Musculoskeletal:         General: Normal range of motion.      Cervical back: Normal range of motion and neck " supple.   Skin:     General: Skin is warm and dry.      Capillary Refill: Capillary refill takes less than 2 seconds.   Neurological:      General: No focal deficit present.      Mental Status: He is alert and oriented to person, place, and time.   Psychiatric:         Mood and Affect: Mood normal.         Behavior: Behavior normal.         Thought Content: Thought content normal.         Judgment: Judgment normal.             Diagnoses and health risks identified today and associated recommendations/orders:    Problem List Items Addressed This Visit          Neuro    Chronic pain syndrome     Will continue current medication as prescribed. Symptoms under control             Psychiatric    Depression     Depression is controlled. Current medical regimen is effective;   Will adjust according to results and monitor.             Cardiac/Vascular    Hypertension     Blood pressure is controlled. Current medical regimen is effective;   Will adjust according to results and monitor.          Hyperlipidemia     Hyperlipidemia is controlled. Current medical regimen is effective;   Will adjust according to results and monitor.             Renal/    Encounter for screening for malignant neoplasm of prostate - Primary     Pt wants to wait to have this drawn in June when he does his other blood work         Relevant Orders    PSA, Screening       Endocrine    BMI 30.0-30.9,adult     He has lost about 4 lbs since his December visit            Provided Simon with a 5-10 year written screening schedule and personal prevention plan. Recommendations were developed using the USPSTF age appropriate recommendations. Education, counseling, and referrals were provided as needed. After Visit Summary printed and given to patient which includes a list of additional screenings\tests needed.    Follow up for yearly annual wellness visit    Declined AAA Screening.  Declined all vaccines today.   Pt wants to wait until next visit to do PSA  with regular follow up blood work.   [place] and was to repeat in 10 years and normal.    I offered to discuss advanced care planning, including how to pick a person who would make decisions for you if you were unable to make them for yourself, called a health care power of , and what kind of decisions you might make such as use of life sustaining treatments such as ventilators and tube feeding when faced with a life limiting illness recorded on a living will that they will need to know. (How you want to be cared for as you near the end of your natural life)     X  Patient is unwilling to engage in a discussion regarding advance directives at this time.

## 2024-02-14 ENCOUNTER — OFFICE VISIT (OUTPATIENT)
Dept: FAMILY MEDICINE | Facility: CLINIC | Age: 69
End: 2024-02-14
Payer: MEDICARE

## 2024-02-14 VITALS
BODY MASS INDEX: 30.42 KG/M2 | RESPIRATION RATE: 20 BRPM | WEIGHT: 237 LBS | HEIGHT: 74 IN | OXYGEN SATURATION: 99 % | TEMPERATURE: 98 F | DIASTOLIC BLOOD PRESSURE: 72 MMHG | HEART RATE: 68 BPM | SYSTOLIC BLOOD PRESSURE: 106 MMHG

## 2024-02-14 DIAGNOSIS — G89.4 CHRONIC PAIN SYNDROME: ICD-10-CM

## 2024-02-14 DIAGNOSIS — Z00.00 ENCOUNTER FOR SUBSEQUENT ANNUAL WELLNESS VISIT (AWV) IN MEDICARE PATIENT: Primary | ICD-10-CM

## 2024-02-14 DIAGNOSIS — I10 HYPERTENSION, UNSPECIFIED TYPE: ICD-10-CM

## 2024-02-14 DIAGNOSIS — E78.5 HYPERLIPIDEMIA, UNSPECIFIED HYPERLIPIDEMIA TYPE: ICD-10-CM

## 2024-02-14 DIAGNOSIS — Z12.5 ENCOUNTER FOR SCREENING FOR MALIGNANT NEOPLASM OF PROSTATE: ICD-10-CM

## 2024-02-14 DIAGNOSIS — F32.A DEPRESSION, UNSPECIFIED DEPRESSION TYPE: ICD-10-CM

## 2024-02-14 PROCEDURE — G0439 PPPS, SUBSEQ VISIT: HCPCS | Mod: ,,,

## 2024-02-14 NOTE — PATIENT INSTRUCTIONS
Counseling and Referral of Other Preventative  (Italic type indicates deductible and co-insurance are waived)    Patient Name: Simon Voss  Today's Date: 2/14/2024    Health Maintenance       Date Due Completion Date    Hepatitis C Screening Never done ---    COVID-19 Vaccine (1) Never done ---    Sign Pain Contract Never done ---    Colorectal Cancer Screening Never done ---    Shingles Vaccine (1 of 2) Never done ---    RSV Vaccine (Age 60+ and Pregnant patients) (1 - 1-dose 60+ series) Never done ---    Abdominal Aortic Aneurysm Screening Never done ---    Pneumococcal Vaccines (Age 65+) (2 of 2 - PPSV23 or PCV20) 11/10/2021 11/10/2020    PROSTATE-SPECIFIC ANTIGEN 02/08/2024 2/8/2023    Override on 8/7/2020: Done    Hemoglobin A1c (Prediabetes) 12/13/2024 12/13/2023    Lipid Panel 12/13/2024 12/13/2023    Override on 8/7/2020: Done (chol: 151 tri: 90 hdl: 48 ldl: 85)    TETANUS VACCINE 11/10/2030 11/10/2020        No orders of the defined types were placed in this encounter.      Counseling and Referral of Other Preventative  (Italic type indicates deductible and co-insurance are waived)    Patient Name: Simon Voss  Today's Date: 2/14/2024    Health Maintenance       Date Due Completion Date    Hepatitis C Screening Never done ---    COVID-19 Vaccine (1) Never done ---    Sign Pain Contract Never done ---    Colorectal Cancer Screening Never done ---    Shingles Vaccine (1 of 2) Never done ---    RSV Vaccine (Age 60+ and Pregnant patients) (1 - 1-dose 60+ series) Never done ---    Abdominal Aortic Aneurysm Screening Never done ---    Pneumococcal Vaccines (Age 65+) (2 of 2 - PPSV23 or PCV20) 11/10/2021 11/10/2020    PROSTATE-SPECIFIC ANTIGEN 02/08/2024 2/8/2023    Override on 8/7/2020: Done    Hemoglobin A1c (Prediabetes) 12/13/2024 12/13/2023    Lipid Panel 12/13/2024 12/13/2023    Override on 8/7/2020: Done (chol: 151 tri: 90 hdl: 48 ldl: 85)    TETANUS VACCINE 11/10/2030 11/10/2020        No orders  of the defined types were placed in this encounter.      The following information is provided to all patients.  This information is to help you find resources for any of the problems found today that may be affecting your health:                  Living healthy guide: [place].gov    Understanding Diabetes: www.diabetes.org      Eating healthy: www.cdc.gov/healthyweight      CDC home safety checklist: www.cdc.gov/steadi/patient.html      Agency on Aging: [place].gov    Alcoholics anonymous (AA): www.aa.org      Physical Activity: www.[place]ust[place].nih.gov/rc7zdst      Tobacco use: [place].gov

## 2024-05-08 ENCOUNTER — OFFICE VISIT (OUTPATIENT)
Dept: PAIN MEDICINE | Facility: CLINIC | Age: 69
End: 2024-05-08
Payer: MEDICARE

## 2024-05-08 VITALS
SYSTOLIC BLOOD PRESSURE: 130 MMHG | HEIGHT: 74 IN | RESPIRATION RATE: 18 BRPM | BODY MASS INDEX: 29.77 KG/M2 | HEART RATE: 67 BPM | WEIGHT: 232 LBS | DIASTOLIC BLOOD PRESSURE: 74 MMHG

## 2024-05-08 DIAGNOSIS — R10.84 GENERALIZED ABDOMINAL PAIN: Chronic | ICD-10-CM

## 2024-05-08 DIAGNOSIS — Z79.899 ENCOUNTER FOR LONG-TERM (CURRENT) USE OF OTHER MEDICATIONS: Primary | ICD-10-CM

## 2024-05-08 DIAGNOSIS — G89.4 CHRONIC PAIN SYNDROME: Chronic | ICD-10-CM

## 2024-05-08 PROCEDURE — 99999PBSHW POCT URINE DRUG SCREEN PRESUMP: Mod: PBBFAC,,,

## 2024-05-08 PROCEDURE — 99214 OFFICE O/P EST MOD 30 MIN: CPT | Mod: S$PBB,,, | Performed by: PAIN MEDICINE

## 2024-05-08 PROCEDURE — 80305 DRUG TEST PRSMV DIR OPT OBS: CPT | Mod: PBBFAC | Performed by: PAIN MEDICINE

## 2024-05-08 PROCEDURE — 99214 OFFICE O/P EST MOD 30 MIN: CPT | Mod: PBBFAC | Performed by: PAIN MEDICINE

## 2024-05-08 RX ORDER — HYDROCODONE BITARTRATE AND ACETAMINOPHEN 10; 325 MG/1; MG/1
1 TABLET ORAL EVERY 6 HOURS PRN
Qty: 120 TABLET | Refills: 0 | Status: SHIPPED | OUTPATIENT
Start: 2024-06-10 | End: 2024-07-10

## 2024-05-08 RX ORDER — HYDROCODONE BITARTRATE AND ACETAMINOPHEN 10; 325 MG/1; MG/1
1 TABLET ORAL EVERY 6 HOURS PRN
Qty: 120 TABLET | Refills: 0 | Status: SHIPPED | OUTPATIENT
Start: 2024-05-10 | End: 2024-06-09

## 2024-05-08 RX ORDER — HYDROCODONE BITARTRATE AND ACETAMINOPHEN 10; 325 MG/1; MG/1
1 TABLET ORAL EVERY 6 HOURS PRN
Qty: 120 TABLET | Refills: 0 | Status: SHIPPED | OUTPATIENT
Start: 2024-05-11 | End: 2024-05-08 | Stop reason: RX

## 2024-05-08 RX ORDER — HYDROCODONE BITARTRATE AND ACETAMINOPHEN 10; 325 MG/1; MG/1
1 TABLET ORAL EVERY 6 HOURS PRN
Qty: 120 TABLET | Refills: 0 | Status: SHIPPED | OUTPATIENT
Start: 2024-07-10 | End: 2024-08-09

## 2024-05-08 NOTE — PROGRESS NOTES
Disclaimer: This note has been generated using voice-recognition software. There may be typographical errors that have been missed during proof-reading        Patient ID: Simon Voss is a 68 y.o. male.      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  He has been treated with Norco for several years and is not a candidate for surgical evaluation.  His pain involves all 4 quadrants and he denies any changes since his last office visit.  He returns today for medication refill.  He declines nerve block injections.                Pain Assessment  Pain Assessment: 0-10  Pain Score:   7  Pain Location: Abdomen  Pain Descriptors: Sharp, Dull  Pain Frequency: Constant/continuous  Pain Onset: Gradual  Clinical Progression: Not changed  Aggravating Factors: Other (Comment) (strain)  Pain Intervention(s): Medication (See eMAR), Home medication, Heat applied, Cold applied      A's of Opioid Risk Assessment  Activity:Patient can perform ADL.   Analgesia:Patients pain  controlled by current medication.   Adverse Effects: Patient denies constipation or sedation.  Aberrant Behavior:  reviewed with no aberrant drug seeking/taking behavior.      Patient denies any suicidal or homicidal ideations    Physical Therapy/Home Exercise: not applicable          Review of Systems   Constitutional: Negative.    HENT: Negative.     Eyes: Negative.    Respiratory: Negative.     Cardiovascular: Negative.    Gastrointestinal:  Positive for abdominal pain.   Endocrine: Negative.    Genitourinary: Negative.    Musculoskeletal: Negative.    Integumentary:  Negative.   Allergic/Immunologic: Negative.    Neurological: Negative.    Hematological: Negative.    Psychiatric/Behavioral: Negative.               Past Medical History:   Diagnosis Date    Anxiety     Cervical spondylosis without  myelopathy     Chronic pain syndrome     Depression     Generalized abdominal pain     High cholesterol     Hypertension      Past Surgical History:   Procedure Laterality Date    APPENDECTOMY       Social History     Socioeconomic History    Marital status:    Tobacco Use    Smoking status: Former     Current packs/day: 0.00     Average packs/day: 0.5 packs/day for 14.0 years (7.0 ttl pk-yrs)     Types: Cigarettes     Start date:      Quit date:      Years since quittin.3    Smokeless tobacco: Former     Types: Snuff, Chew     Quit date: 2023    Tobacco comments:     1/2 pack daily   Substance and Sexual Activity    Alcohol use: Not Currently    Drug use: Yes     Types: Hydrocodone    Sexual activity: Not Currently     Social Determinants of Health     Financial Resource Strain: Low Risk  (2024)    Overall Financial Resource Strain (CARDIA)     Difficulty of Paying Living Expenses: Not hard at all   Food Insecurity: No Food Insecurity (2024)    Hunger Vital Sign     Worried About Running Out of Food in the Last Year: Never true     Ran Out of Food in the Last Year: Never true   Transportation Needs: No Transportation Needs (2024)    PRAPARE - Transportation     Lack of Transportation (Medical): No     Lack of Transportation (Non-Medical): No   Physical Activity: Sufficiently Active (2024)    Exercise Vital Sign     Days of Exercise per Week: 5 days     Minutes of Exercise per Session: 40 min   Stress: Stress Concern Present (2024)    [employer] - Occupational Stress Questionnaire     Feeling of Stress : To some extent   Housing Stability: Low Risk  (2024)    Housing Stability Vital Sign     Unable to Pay for Housing in the Last Year: No     Number of Places Lived in the Last Year: 1     Unstable Housing in the Last Year: No     Family History   Problem Relation Name Age of Onset    Cancer Mother      Diabetes Maternal Grandmother        Review of patient's allergies indicates:  No Known Allergies      Objective:  Vitals:    05/08/24 1138   BP: 130/74   Pulse: 67   Resp: 18        Physical Exam  Vitals and nursing note reviewed.   Constitutional:       General: He is not in acute distress.     Appearance: Normal appearance. He is not ill-appearing, toxic-appearing or diaphoretic.   HENT:      Head: Normocephalic and atraumatic.      Nose: Nose normal.      Mouth/Throat:      Mouth: Mucous membranes are moist.   Eyes:      Extraocular Movements: Extraocular movements intact.      Pupils: Pupils are equal, round, and reactive to light.   Cardiovascular:      Rate and Rhythm: Normal rate and regular rhythm.      Heart sounds: Normal heart sounds.   Pulmonary:      Effort: Pulmonary effort is normal. No respiratory distress.      Breath sounds: Normal breath sounds. No stridor. No wheezing or rhonchi.   Abdominal:      General: Bowel sounds are normal.      Palpations: Abdomen is soft.   Musculoskeletal:         General: No swelling, tenderness or deformity. Normal range of motion.      Cervical back: Normal and normal range of motion. No spasms or tenderness. No pain with movement. Normal range of motion.      Thoracic back: Normal.      Lumbar back: No spasms, tenderness or bony tenderness. Normal range of motion. Negative right straight leg raise test and negative left straight leg raise test. No scoliosis.      Right lower leg: No edema.      Left lower leg: No edema.   Skin:     General: Skin is warm.   Neurological:      General: No focal deficit present.      Mental Status: He is alert and oriented to person, place, and time. Mental status is at baseline.      Cranial Nerves: No cranial nerve deficit.      Sensory: Sensation is intact. No sensory deficit.      Motor: No weakness.      Coordination: Coordination normal.      Gait: Gait normal.      Deep Tendon Reflexes: Reflexes are normal and symmetric.   Psychiatric:         Mood and Affect:  Mood normal.         Behavior: Behavior normal.           Assessment:      1. Encounter for long-term (current) use of other medications    2. Generalized abdominal pain    3. Chronic pain syndrome          Plan:  1. reviewed  2.Addiction, Dependency, Tolerance, Opioid abuse-misuse, Death, Diversion Discussed. Overdose reversal drug Naloxone discussed. Patient is prescribed opiates for chronic nonmalignant pain pathology.  Patient is receiving opiates which require greater than a 72 hour supply of therapy.  Patient was educated on potential dependency associated with long-term opioid use as well as decreasing efficacy with prolonged use.  Patient was advised of risks, benefits and side effects and how to utilize each medication.  Patient was also informed that any deviation from therapy protocol will  lead to discontinuation of opiates.  It is reasonable to prescribe opioid analgesics for patient based on positive response to opioid medications, lack of side effects and  limited aberrant behavior.    3.Refill/Continue medications for pain control and function       Requested Prescriptions     Signed Prescriptions Disp Refills    HYDROcodone-acetaminophen (NORCO)  mg per tablet 120 tablet 0     Sig: Take 1 tablet by mouth every 6 (six) hours as needed for Pain (pain).    HYDROcodone-acetaminophen (NORCO)  mg per tablet 120 tablet 0     Sig: Take 1 tablet by mouth every 6 (six) hours as needed for Pain (pain).    HYDROcodone-acetaminophen (NORCO)  mg per tablet 120 tablet 0     Sig: Take 1 tablet by mouth every 6 (six) hours as needed for Pain (pain).     4.Urine drug screen point of care obtained and consistent with prescribed medications and medication refill date.  Drug screen is to monitor compliance with prescribed opiates and to ensure that there was no misuse/abuse of other non-prescribed opioids or illicit drugs.  From this information I will determine if patient is suitable for opioid  therapy    Orders Placed This Encounter   Procedures    POCT Urine Drug Screen ([facility])     Interpretive Information:     Negative:  No drug detected at the cut off level.   Positive:  This result represents presumptive positive for the   tested drug, other substances may yield a positive response other   than the analyte of interest. This result should be utilized for   diagnostic purpose only. Confirmation testing will be performed upon physician request only.         5.Patient defers nerve block injections, physical therapy or surgical consultation  6.Follow with [facility] in 3 months for re-evaluation and medication refill         report:  Reviewed and consistent with medication use as prescribed.      The total time spent for evaluation and management on 05/09/2024 including reviewing separately obtained history, performing a medically appropriate exam and evaluation, documenting clinical information in the health record, independently interpreting results and communicating them to the patient/family/caregiver, and ordering medications/tests/procedures was between 15-29 minutes.    The above plan and management options were discussed at length with patient. Patient is in agreement with the above and verbalized understanding. It will be communicated with the referring physician via electronic record, fax, or mail.

## 2024-06-11 ENCOUNTER — OFFICE VISIT (OUTPATIENT)
Dept: FAMILY MEDICINE | Facility: CLINIC | Age: 69
End: 2024-06-11
Payer: MEDICARE

## 2024-06-11 VITALS
TEMPERATURE: 98 F | DIASTOLIC BLOOD PRESSURE: 77 MMHG | WEIGHT: 233.81 LBS | SYSTOLIC BLOOD PRESSURE: 112 MMHG | HEIGHT: 74 IN | BODY MASS INDEX: 30.01 KG/M2 | RESPIRATION RATE: 18 BRPM | HEART RATE: 60 BPM | OXYGEN SATURATION: 98 %

## 2024-06-11 DIAGNOSIS — Z12.5 SCREENING FOR PROSTATE CANCER: ICD-10-CM

## 2024-06-11 DIAGNOSIS — I10 HYPERTENSION, UNSPECIFIED TYPE: Primary | ICD-10-CM

## 2024-06-11 DIAGNOSIS — Z23 NEED FOR PNEUMOCOCCAL VACCINE: ICD-10-CM

## 2024-06-11 DIAGNOSIS — F32.A DEPRESSION, UNSPECIFIED DEPRESSION TYPE: ICD-10-CM

## 2024-06-11 DIAGNOSIS — G47.00 INSOMNIA, UNSPECIFIED TYPE: ICD-10-CM

## 2024-06-11 DIAGNOSIS — E78.5 HYPERLIPIDEMIA, UNSPECIFIED HYPERLIPIDEMIA TYPE: ICD-10-CM

## 2024-06-11 DIAGNOSIS — M47.812 CERVICAL SPONDYLOSIS WITHOUT MYELOPATHY: Chronic | ICD-10-CM

## 2024-06-11 LAB
ALBUMIN SERPL BCP-MCNC: 3.5 G/DL (ref 3.5–5)
ALBUMIN/GLOB SERPL: 1.1 {RATIO}
ALP SERPL-CCNC: 51 U/L (ref 45–115)
ALT SERPL W P-5'-P-CCNC: 18 U/L (ref 16–61)
ANION GAP SERPL CALCULATED.3IONS-SCNC: 8 MMOL/L (ref 7–16)
AST SERPL W P-5'-P-CCNC: 17 U/L (ref 15–37)
BASOPHILS # BLD AUTO: 0.12 K/UL (ref 0–0.2)
BASOPHILS NFR BLD AUTO: 1.3 % (ref 0–1)
BILIRUB SERPL-MCNC: 0.3 MG/DL (ref ?–1.2)
BUN SERPL-MCNC: 24 MG/DL (ref 7–18)
BUN/CREAT SERPL: 19 (ref 6–20)
CALCIUM SERPL-MCNC: 8.5 MG/DL (ref 8.5–10.1)
CHLORIDE SERPL-SCNC: 106 MMOL/L (ref 98–107)
CHOLEST SERPL-MCNC: 144 MG/DL (ref 0–200)
CHOLEST/HDLC SERPL: 2.9 {RATIO}
CO2 SERPL-SCNC: 28 MMOL/L (ref 21–32)
CREAT SERPL-MCNC: 1.29 MG/DL (ref 0.7–1.3)
CREAT UR-MCNC: 179 MG/DL (ref 39–259)
DIFFERENTIAL METHOD BLD: ABNORMAL
EGFR (NO RACE VARIABLE) (RUSH/TITUS): 60 ML/MIN/1.73M2
EOSINOPHIL # BLD AUTO: 0.41 K/UL (ref 0–0.5)
EOSINOPHIL NFR BLD AUTO: 4.5 % (ref 1–4)
ERYTHROCYTE [DISTWIDTH] IN BLOOD BY AUTOMATED COUNT: 12.6 % (ref 11.5–14.5)
GLOBULIN SER-MCNC: 3.3 G/DL (ref 2–4)
GLUCOSE SERPL-MCNC: 108 MG/DL (ref 74–106)
HCT VFR BLD AUTO: 46.8 % (ref 40–54)
HDLC SERPL-MCNC: 50 MG/DL (ref 40–60)
HGB BLD-MCNC: 15.1 G/DL (ref 13.5–18)
IMM GRANULOCYTES # BLD AUTO: 0.06 K/UL (ref 0–0.04)
IMM GRANULOCYTES NFR BLD: 0.7 % (ref 0–0.4)
LDLC SERPL CALC-MCNC: 74 MG/DL
LDLC/HDLC SERPL: 1.5 {RATIO}
LYMPHOCYTES # BLD AUTO: 2.42 K/UL (ref 1–4.8)
LYMPHOCYTES NFR BLD AUTO: 26.8 % (ref 27–41)
MCH RBC QN AUTO: 30.5 PG (ref 27–31)
MCHC RBC AUTO-ENTMCNC: 32.3 G/DL (ref 32–36)
MCV RBC AUTO: 94.5 FL (ref 80–96)
MICROALBUMIN UR-MCNC: 0.6 MG/DL (ref 0–2.8)
MICROALBUMIN/CREAT RATIO PNL UR: 3.4 MG/G (ref 0–30)
MONOCYTES # BLD AUTO: 0.79 K/UL (ref 0–0.8)
MONOCYTES NFR BLD AUTO: 8.7 % (ref 2–6)
MPC BLD CALC-MCNC: 11.3 FL (ref 9.4–12.4)
NEUTROPHILS # BLD AUTO: 5.24 K/UL (ref 1.8–7.7)
NEUTROPHILS NFR BLD AUTO: 58 % (ref 53–65)
NONHDLC SERPL-MCNC: 94 MG/DL
NRBC # BLD AUTO: 0 X10E3/UL
NRBC, AUTO (.00): 0 %
PLATELET # BLD AUTO: 206 K/UL (ref 150–400)
POTASSIUM SERPL-SCNC: 3.9 MMOL/L (ref 3.5–5.1)
PROT SERPL-MCNC: 6.8 G/DL (ref 6.4–8.2)
PSA SERPL-MCNC: 0.39 NG/ML
RBC # BLD AUTO: 4.95 M/UL (ref 4.6–6.2)
SODIUM SERPL-SCNC: 138 MMOL/L (ref 136–145)
TRIGL SERPL-MCNC: 99 MG/DL (ref 35–150)
VLDLC SERPL-MCNC: 20 MG/DL
WBC # BLD AUTO: 9.04 K/UL (ref 4.5–11)

## 2024-06-11 PROCEDURE — 80061 LIPID PANEL: CPT | Mod: ,,, | Performed by: CLINICAL MEDICAL LABORATORY

## 2024-06-11 PROCEDURE — 80053 COMPREHEN METABOLIC PANEL: CPT | Mod: ,,, | Performed by: CLINICAL MEDICAL LABORATORY

## 2024-06-11 PROCEDURE — 85025 COMPLETE CBC W/AUTO DIFF WBC: CPT | Mod: ,,, | Performed by: CLINICAL MEDICAL LABORATORY

## 2024-06-11 PROCEDURE — 82043 UR ALBUMIN QUANTITATIVE: CPT | Mod: ,,, | Performed by: CLINICAL MEDICAL LABORATORY

## 2024-06-11 PROCEDURE — 99214 OFFICE O/P EST MOD 30 MIN: CPT | Mod: ,,, | Performed by: NURSE PRACTITIONER

## 2024-06-11 PROCEDURE — 90677 PCV20 VACCINE IM: CPT | Mod: ,,, | Performed by: NURSE PRACTITIONER

## 2024-06-11 PROCEDURE — G0009 ADMIN PNEUMOCOCCAL VACCINE: HCPCS | Mod: ,,, | Performed by: NURSE PRACTITIONER

## 2024-06-11 PROCEDURE — 82570 ASSAY OF URINE CREATININE: CPT | Mod: ,,, | Performed by: CLINICAL MEDICAL LABORATORY

## 2024-06-11 PROCEDURE — G0103 PSA SCREENING: HCPCS | Mod: ,,, | Performed by: CLINICAL MEDICAL LABORATORY

## 2024-06-11 RX ORDER — ATENOLOL 25 MG/1
25 TABLET ORAL DAILY
Qty: 90 TABLET | Refills: 1 | Status: SHIPPED | OUTPATIENT
Start: 2024-06-11

## 2024-06-11 RX ORDER — ESCITALOPRAM OXALATE 20 MG/1
20 TABLET ORAL DAILY
Qty: 90 TABLET | Refills: 1 | Status: SHIPPED | OUTPATIENT
Start: 2024-06-11

## 2024-06-11 RX ORDER — CYCLOBENZAPRINE HCL 10 MG
10 TABLET ORAL 3 TIMES DAILY
Qty: 270 TABLET | Refills: 1 | Status: SHIPPED | OUTPATIENT
Start: 2024-06-11

## 2024-06-11 RX ORDER — TRAZODONE HYDROCHLORIDE 150 MG/1
150 TABLET ORAL NIGHTLY
Qty: 90 TABLET | Refills: 1 | Status: SHIPPED | OUTPATIENT
Start: 2024-06-11

## 2024-06-11 RX ORDER — LOVASTATIN 40 MG/1
40 TABLET ORAL NIGHTLY
Qty: 90 TABLET | Refills: 1 | Status: SHIPPED | OUTPATIENT
Start: 2024-06-11

## 2024-06-11 RX ORDER — LOSARTAN POTASSIUM AND HYDROCHLOROTHIAZIDE 12.5; 5 MG/1; MG/1
1 TABLET ORAL DAILY
Qty: 90 TABLET | Refills: 1 | Status: SHIPPED | OUTPATIENT
Start: 2024-06-11

## 2024-06-11 NOTE — ASSESSMENT & PLAN NOTE
Spondylosis is controlled. Current medical regimen is effective;   Will adjust according to results and monitor.

## 2024-06-11 NOTE — ASSESSMENT & PLAN NOTE
Insomnia  is controlled. Current medical regimen is effective;   Will adjust according to results and monitor.

## 2024-06-11 NOTE — PROGRESS NOTES
SOFIA Garcia   [facility]  [address]  [place] [address]     PATIENT NAME: Simon Voss  : 1955  DATE: 24  MRN: [id number]      Billing Provider: SOFIA Garcia  Level of Service:   Patient PCP Information       Provider PCP Type    Primary Doctor No General            Reason for Visit / Chief Complaint: Hypertension (Needs a med refill, he also state that he is fasting for labs today, he didn't bring any of medications from home), Hyperlipidemia (Needs a refill), Establish Care, and Health Maintenance (Hepatitis C Screening Never done-dec/Colorectal Cancer Screening Never done-dec/Shingles Vaccine(1 of 2) Never done-dec/RSV Vaccine (Age 60+ and Pregnant patients)(1 - 1-dose 60+ series) Never done-dec/Abdominal Aortic Aneurysm Screening Never done/Pneumococcal Vaccines (Age 65+)(2 of 2 - PPSV23 or PCV20) due on 11/10/2021/COVID-19 Vaccine( -  season) Never done-dec/PROSTATE-SPECIFIC ANTIGEN due on 2024/)   Health Maintenance Due   Topic Date Due    Hepatitis C Screening  Never done    Colorectal Cancer Screening  Never done    Shingles Vaccine (1 of 2) Never done    RSV Vaccine (Age 60+ and Pregnant patients) (1 - 1-dose 60+ series) Never done    Abdominal Aortic Aneurysm Screening  Never done    COVID-19 Vaccine ( season) Never done    PROSTATE-SPECIFIC ANTIGEN  2024          Update PCP  Update Chief Complaint         History of Present Illness / Problem Focused Workflow     Simon Voss presents to the clinic for med refills and labs. He states he had a cscope done in 2019 that was normal. He states he will have to have one when he turns 74. He states he thinks he is needing the other half of his pneumonia vaccine today. He denies any recent illness. He does  see Pain management [facility]. He states he had his appendix removed and started having pain ever since from scar tissue is the reason he is going there now. He denies any  other needs/concerns at this time. NAD noted.     Hemoglobin A1C   Date Value Ref Range Status   12/13/2023 5.6 4.5 - 6.6 % Final     Comment:       Normal:               <5.7%  Pre-Diabetic:       5.7% to 6.4%  Diabetic:             >6.4%  Diabetic Goal:     <7%   06/06/2023 5.7 4.5 - 6.6 % Final     Comment:       Normal:               <5.7%  Pre-Diabetic:       5.7% to 6.4%  Diabetic:             >6.4%  Diabetic Goal:     <7%   02/11/2022 5.8 4.5 - 6.6 % Final     Comment:       Normal:               <5.7%  Pre-Diabetic:       5.7% to 6.4%  Diabetic:             >6.4%  Diabetic Goal:     <7%        CMP  Sodium   Date Value Ref Range Status   12/13/2023 139 136 - 145 mmol/L Final     Potassium   Date Value Ref Range Status   12/13/2023 3.8 3.5 - 5.1 mmol/L Final     Chloride   Date Value Ref Range Status   12/13/2023 104 98 - 107 mmol/L Final     CO2   Date Value Ref Range Status   12/13/2023 28 21 - 32 mmol/L Final     Glucose   Date Value Ref Range Status   12/13/2023 128 (H) 74 - 106 mg/dL Final   02/09/2021 98 mg/dL Final     BUN   Date Value Ref Range Status   12/13/2023 20 (H) 7 - 18 mg/dL Final     Creatinine   Date Value Ref Range Status   12/13/2023 1.39 (H) 0.70 - 1.30 mg/dL Final     Calcium   Date Value Ref Range Status   12/13/2023 9.3 8.5 - 10.1 mg/dL Final     Total Protein   Date Value Ref Range Status   12/13/2023 7.6 6.4 - 8.2 g/dL Final     Albumin   Date Value Ref Range Status   12/13/2023 4.2 3.5 - 5.0 g/dL Final     Bilirubin, Total   Date Value Ref Range Status   12/13/2023 0.4 >0.0 - 1.2 mg/dL Final     Alk Phos   Date Value Ref Range Status   12/13/2023 54 45 - 115 U/L Final     AST   Date Value Ref Range Status   12/13/2023 15 15 - 37 U/L Final     ALT   Date Value Ref Range Status   12/13/2023 27 16 - 61 U/L Final     Anion Gap   Date Value Ref Range Status   12/13/2023 11 7 - 16 mmol/L Final     eGFR   Date Value Ref Range Status   12/13/2023 55 (L) >=60 mL/min/1.73m2 Final        Lab  Results   Component Value Date    WBC 7.44 12/13/2023    RBC 5.60 12/13/2023    HGB 17.0 12/13/2023    HCT 51.5 12/13/2023    MCV 92.0 12/13/2023    MCH 30.4 12/13/2023    MCHC 33.0 12/13/2023    RDW 12.2 12/13/2023     12/13/2023    MPV 10.8 12/13/2023    LYMPH 36.8 12/13/2023    LYMPH 2.74 12/13/2023    MONO 7.7 (H) 12/13/2023    EOS 0.42 12/13/2023    BASO 0.10 12/13/2023    EOSINOPHIL 5.6 (H) 12/13/2023    BASOPHIL 1.3 (H) 12/13/2023        Lab Results   Component Value Date    CHOL 172 12/13/2023    CHOL 161 02/08/2023    CHOL 131 08/08/2022     Lab Results   Component Value Date    HDL 52 12/13/2023    HDL 53 02/08/2023    HDL 45 08/08/2022     Lab Results   Component Value Date    LDLCALC 100 12/13/2023    LDLCALC 91 02/08/2023    LDLCALC 68 08/08/2022     Lab Results   Component Value Date    TRIG 99 12/13/2023    TRIG 86 02/08/2023    TRIG 90 08/08/2022     Lab Results   Component Value Date    CHOLHDL 3.3 12/13/2023    CHOLHDL 3.0 02/08/2023    CHOLHDL 2.9 08/08/2022        Wt Readings from Last 3 Encounters:   06/11/24 0831 106.1 kg (233 lb 12.8 oz)   05/08/24 1138 105.2 kg (232 lb)   02/14/24 0947 107.5 kg (237 lb)        BP Readings from Last 3 Encounters:   06/11/24 112/77   05/08/24 130/74   02/14/24 106/72        Review of Systems     Review of Systems   Constitutional: Negative.    Eyes: Negative.    Respiratory: Negative.     Cardiovascular: Negative.    Gastrointestinal: Negative.    Endocrine: Negative.    Genitourinary: Negative.    Musculoskeletal:  Positive for arthralgias.   Integumentary:  Negative.   Allergic/Immunologic: Negative.    Neurological: Negative.    Hematological: Negative.    Psychiatric/Behavioral: Negative.          Medical / Social / Family History     Past Medical History:   Diagnosis Date    Anxiety     Cervical spondylosis without myelopathy     Chronic pain syndrome     Depression     Generalized abdominal pain     High cholesterol     Hypertension        Past  Surgical History:   Procedure Laterality Date    APPENDECTOMY         Social History    reports that he quit smoking about 50 years ago. His smoking use included cigarettes. He started smoking about 64 years ago. He has a 7 pack-year smoking history. He quit smokeless tobacco use about 8 months ago.  His smokeless tobacco use included snuff and chew. He reports that he does not currently use alcohol. He reports current drug use. Drug: Hydrocodone.    Family History  's family history includes Cancer in his mother; Diabetes in his maternal grandmother.    Medications and Allergies     Medications  Outpatient Medications Marked as Taking for the 6/11/24 encounter (Office Visit) with Terri Galeana FNP   Medication Sig Dispense Refill    [START ON 7/10/2024] HYDROcodone-acetaminophen (NORCO)  mg per tablet Take 1 tablet by mouth every 6 (six) hours as needed for Pain (pain). 120 tablet 0    [DISCONTINUED] atenoloL (TENORMIN) 25 MG tablet Take 1 tablet (25 mg total) by mouth once daily. 90 tablet 1    [DISCONTINUED] cyclobenzaprine (FLEXERIL) 10 MG tablet Take 1 tablet (10 mg total) by mouth 3 (three) times daily. 270 tablet 1    [DISCONTINUED] EScitalopram oxalate (LEXAPRO) 20 MG tablet Take 1 tablet (20 mg total) by mouth once daily. 90 tablet 1    [DISCONTINUED] losartan-hydrochlorothiazide 50-12.5 mg (HYZAAR) 50-12.5 mg per tablet Take 1 tablet by mouth once daily. 90 tablet 1    [DISCONTINUED] lovastatin (MEVACOR) 40 MG tablet Take 1 tablet (40 mg total) by mouth nightly. 90 tablet 1    [DISCONTINUED] traZODone (DESYREL) 150 MG tablet Take 1 tablet (150 mg total) by mouth every evening. 90 tablet 1       Allergies  Review of patient's allergies indicates:  No Known Allergies    Physical Examination     Vitals:    06/11/24 0831   BP: 112/77   BP Location: Left arm   Patient Position: Sitting   BP Method: Large (Automatic)   Pulse: 60   Resp: 18   Temp: 97.8 °F (36.6 °C)   TempSrc: Oral   SpO2: 98%  "  Weight: 106.1 kg (233 lb 12.8 oz)   Height: 6' 2" (1.88 m)      Physical Exam  Constitutional:       Appearance: Normal appearance.   HENT:      Head: Normocephalic.   Eyes:      Extraocular Movements: Extraocular movements intact.   Cardiovascular:      Rate and Rhythm: Normal rate and regular rhythm.      Pulses: Normal pulses.      Heart sounds: Normal heart sounds.   Pulmonary:      Effort: Pulmonary effort is normal.      Breath sounds: Normal breath sounds.   Skin:     General: Skin is warm and dry.      Capillary Refill: Capillary refill takes less than 2 seconds.   Neurological:      General: No focal deficit present.      Mental Status: He is alert and oriented to person, place, and time.   Psychiatric:         Mood and Affect: Mood normal.         Behavior: Behavior normal.          Assessment and Plan (including Health Maintenance)      Problem List  Smart Sets  Document Outside HM   :    Plan:     There are no Patient Instructions on file for this visit.       Health Maintenance Due   Topic Date Due    Hepatitis C Screening  Never done    Colorectal Cancer Screening  Never done    Shingles Vaccine (1 of 2) Never done    RSV Vaccine (Age 60+ and Pregnant patients) (1 - 1-dose 60+ series) Never done    Abdominal Aortic Aneurysm Screening  Never done    COVID-19 Vaccine (1 - 2023-24 season) Never done    PROSTATE-SPECIFIC ANTIGEN  02/08/2024       Problem List Items Addressed This Visit       Cervical spondylosis without myelopathy (Chronic)    Current Assessment & Plan     Spondylosis is controlled. Current medical regimen is effective;   Will adjust according to results and monitor.          Relevant Medications    cyclobenzaprine (FLEXERIL) 10 MG tablet    Depression    Current Assessment & Plan     Depression is controlled. Current medical regimen is effective;   Will adjust according to results and monitor.          Relevant Medications    EScitalopram oxalate (LEXAPRO) 20 MG tablet    Hyperlipidemia "    Current Assessment & Plan     Labs pending. Will inform of results when available.          Relevant Medications    lovastatin (MEVACOR) 40 MG tablet    Other Relevant Orders    Lipid Panel    Hypertension - Primary    Current Assessment & Plan     Blood pressure is controlled. Current medical regimen is effective;   Will adjust according to results and monitor.     1) Check your blood pressure at home. Please notify me at the clinic if the systolic (top number) is consistently over 140 or under 110 or if the diastolic (bottom number) is consistently over 90 or under 60.  2) Regular aerobic physical activity ( e.g. brisk walking) at least 30 min 5 out of 7 days of the week  3) Low sodium diet.  4) Take your meds as directed  5) To the ER for any signs of chest pain/tightness/palpitations/shortness of breath/difficulty speaking/numbness or tingling in face or extremities  6) Have yearly eye exams           Relevant Medications    atenoloL (TENORMIN) 25 MG tablet    losartan-hydrochlorothiazide 50-12.5 mg (HYZAAR) 50-12.5 mg per tablet    Other Relevant Orders    CBC Auto Differential    Microalbumin/Creatinine Ratio, Urine    Comprehensive Metabolic Panel    Insomnia    Overview     trazadone effective         Current Assessment & Plan     Insomnia  is controlled. Current medical regimen is effective;   Will adjust according to results and monitor.          Relevant Medications    traZODone (DESYREL) 150 MG tablet    Need for pneumococcal vaccine    Current Assessment & Plan     Pneumococcal Conjugate Vaccine 20 given. No adverse reaction noted.          Relevant Orders    (In Office Administered) Pneumococcal Conjugate Vaccine (20 Valent) (IM) (Preferred) (Completed)    Screening for prostate cancer    Current Assessment & Plan     Labs pending. Will inform of results when available.          Relevant Orders    PSA, Screening     Hypertension, unspecified type  -     CBC Auto Differential; Future; Expected date:  06/11/2024  -     atenoloL (TENORMIN) 25 MG tablet; Take 1 tablet (25 mg total) by mouth once daily.  Dispense: 90 tablet; Refill: 1  -     losartan-hydrochlorothiazide 50-12.5 mg (HYZAAR) 50-12.5 mg per tablet; Take 1 tablet by mouth once daily.  Dispense: 90 tablet; Refill: 1  -     Microalbumin/Creatinine Ratio, Urine  -     Comprehensive Metabolic Panel; Future; Expected date: 06/11/2024    Cervical spondylosis without myelopathy  -     cyclobenzaprine (FLEXERIL) 10 MG tablet; Take 1 tablet (10 mg total) by mouth 3 (three) times daily.  Dispense: 270 tablet; Refill: 1    Depression, unspecified depression type  -     EScitalopram oxalate (LEXAPRO) 20 MG tablet; Take 1 tablet (20 mg total) by mouth once daily.  Dispense: 90 tablet; Refill: 1    Hyperlipidemia, unspecified hyperlipidemia type  -     Lipid Panel; Future; Expected date: 06/11/2024  -     lovastatin (MEVACOR) 40 MG tablet; Take 1 tablet (40 mg total) by mouth nightly.  Dispense: 90 tablet; Refill: 1    Insomnia, unspecified type  Comments:  trazadone effective  Orders:  -     traZODone (DESYREL) 150 MG tablet; Take 1 tablet (150 mg total) by mouth every evening.  Dispense: 90 tablet; Refill: 1    Screening for prostate cancer  -     PSA, Screening; Future; Expected date: 06/11/2024    Need for pneumococcal vaccine  -     (In Office Administered) Pneumococcal Conjugate Vaccine (20 Valent) (IM) (Preferred)       Health Maintenance Topics with due status: Not Due       Topic Last Completion Date    TETANUS VACCINE 11/10/2020    Hemoglobin A1c (Prediabetes) 12/13/2023    Lipid Panel 12/13/2023         Future Appointments   Date Time Provider Department Center   8/8/2024 10:00 AM Chad Low PA [facility]   12/11/2024  8:20 AM Terri Galeana FNP [facility]   2/19/2025 10:00 AM AWETHAN NURSE [facility]        No follow-ups on file.    Health Maintenance Due   Topic Date Due    Hepatitis C  Screening  Never done    Colorectal Cancer Screening  Never done    Shingles Vaccine (1 of 2) Never done    RSV Vaccine (Age 60+ and Pregnant patients) (1 - 1-dose 60+ series) Never done    Abdominal Aortic Aneurysm Screening  Never done    COVID-19 Vaccine (1 - 2023-24 season) Never done    PROSTATE-SPECIFIC ANTIGEN  02/08/2024        Signature:  SOFIA Garcia    Date of encounter: 6/11/24

## 2024-07-09 DIAGNOSIS — Z71.89 COMPLEX CARE COORDINATION: ICD-10-CM

## 2024-08-08 ENCOUNTER — OFFICE VISIT (OUTPATIENT)
Dept: PAIN MEDICINE | Facility: CLINIC | Age: 69
End: 2024-08-08
Payer: MEDICARE

## 2024-08-08 VITALS
HEIGHT: 74 IN | BODY MASS INDEX: 29 KG/M2 | WEIGHT: 226 LBS | HEART RATE: 59 BPM | SYSTOLIC BLOOD PRESSURE: 106 MMHG | DIASTOLIC BLOOD PRESSURE: 75 MMHG | RESPIRATION RATE: 18 BRPM

## 2024-08-08 DIAGNOSIS — Z79.899 ENCOUNTER FOR LONG-TERM (CURRENT) USE OF OTHER MEDICATIONS: ICD-10-CM

## 2024-08-08 DIAGNOSIS — M47.812 CERVICAL SPONDYLOSIS WITHOUT MYELOPATHY: Chronic | ICD-10-CM

## 2024-08-08 DIAGNOSIS — R10.84 GENERALIZED ABDOMINAL PAIN: Primary | Chronic | ICD-10-CM

## 2024-08-08 PROCEDURE — 99999 PR PBB SHADOW E&M-EST. PATIENT-LVL IV: CPT | Mod: PBBFAC,,, | Performed by: PHYSICIAN ASSISTANT

## 2024-08-08 PROCEDURE — 80305 DRUG TEST PRSMV DIR OPT OBS: CPT | Mod: PBBFAC | Performed by: PHYSICIAN ASSISTANT

## 2024-08-08 PROCEDURE — 99999PBSHW POCT URINE DRUG SCREEN PRESUMP: Mod: PBBFAC,,,

## 2024-08-08 PROCEDURE — 99214 OFFICE O/P EST MOD 30 MIN: CPT | Mod: S$PBB,,, | Performed by: PHYSICIAN ASSISTANT

## 2024-08-08 PROCEDURE — 99214 OFFICE O/P EST MOD 30 MIN: CPT | Mod: PBBFAC | Performed by: PHYSICIAN ASSISTANT

## 2024-08-08 RX ORDER — HYDROCODONE BITARTRATE AND ACETAMINOPHEN 10; 325 MG/1; MG/1
1 TABLET ORAL EVERY 6 HOURS PRN
Qty: 120 TABLET | Refills: 0 | Status: SHIPPED | OUTPATIENT
Start: 2024-08-09 | End: 2024-09-08

## 2024-08-08 RX ORDER — HYDROCODONE BITARTRATE AND ACETAMINOPHEN 10; 325 MG/1; MG/1
1 TABLET ORAL EVERY 6 HOURS PRN
Qty: 120 TABLET | Refills: 0 | Status: SHIPPED | OUTPATIENT
Start: 2024-10-08 | End: 2024-11-07

## 2024-08-08 RX ORDER — HYDROCODONE BITARTRATE AND ACETAMINOPHEN 10; 325 MG/1; MG/1
1 TABLET ORAL EVERY 6 HOURS PRN
Qty: 120 TABLET | Refills: 0 | Status: SHIPPED | OUTPATIENT
Start: 2024-09-06 | End: 2024-10-06

## 2024-10-29 NOTE — PROGRESS NOTES
Subjective:         Patient ID: Simon Voss is a 68 y.o. male.    Chief Complaint: Abdominal Pain      Pain  This is a chronic problem. The current episode started more than 1 year ago. The problem occurs daily. The problem has been unchanged. Associated symptoms include abdominal pain, arthralgias and neck pain. Pertinent negatives include no chest pain, chills, diaphoresis, fever, headaches, swollen glands, urinary symptoms or vertigo.     Review of Systems   Constitutional:  Negative for activity change, appetite change, chills, diaphoresis and fever.   HENT:  Negative for drooling, ear discharge, ear pain, facial swelling, mouth sores, nosebleeds, trouble swallowing, voice change and goiter.    Eyes:  Negative for photophobia, pain, discharge, redness and visual disturbance.   Respiratory:  Negative for apnea, choking, chest tightness, shortness of breath, wheezing and stridor.    Cardiovascular:  Negative for chest pain, palpitations and leg swelling.   Gastrointestinal:  Positive for abdominal pain. Negative for abdominal distention, diarrhea, rectal pain and fecal incontinence.   Endocrine: Negative for cold intolerance, heat intolerance, polydipsia, polyphagia and polyuria.   Genitourinary:  Negative for bladder incontinence, dysuria, flank pain and frequency.   Musculoskeletal:  Positive for arthralgias, back pain, leg pain, neck pain and neck stiffness.   Integumentary:  Negative for color change and pallor.   Neurological:  Negative for dizziness, vertigo, seizures, syncope, facial asymmetry, speech difficulty, light-headedness, headaches, memory loss and coordination difficulties.   Hematological:  Negative for adenopathy. Does not bruise/bleed easily.   Psychiatric/Behavioral:  Negative for agitation, behavioral problems, confusion, decreased concentration, hallucinations, self-injury and suicidal ideas. The patient is not nervous/anxious and is not hyperactive.            Past Medical History:    Diagnosis Date    Anxiety     Cervical spondylosis without myelopathy     Chronic pain syndrome     Depression     Generalized abdominal pain     High cholesterol     Hypertension      Past Surgical History:   Procedure Laterality Date    APPENDECTOMY       Social History     Socioeconomic History    Marital status:    Tobacco Use    Smoking status: Former     Current packs/day: 0.00     Average packs/day: 0.5 packs/day for 14.0 years (7.0 ttl pk-yrs)     Types: Cigarettes     Start date:      Quit date:      Years since quittin.8    Smokeless tobacco: Former     Types: Snuff, Chew     Quit date: 2023    Tobacco comments:     1/2 pack daily   Substance and Sexual Activity    Alcohol use: Not Currently    Drug use: Yes     Types: Hydrocodone    Sexual activity: Not Currently     Social Drivers of Health     Financial Resource Strain: Low Risk  (2024)    Overall Financial Resource Strain (CARDIA)     Difficulty of Paying Living Expenses: Not hard at all   Food Insecurity: No Food Insecurity (2024)    Hunger Vital Sign     Worried About Running Out of Food in the Last Year: Never true     Ran Out of Food in the Last Year: Never true   Transportation Needs: No Transportation Needs (2024)    PRAPARE - Transportation     Lack of Transportation (Medical): No     Lack of Transportation (Non-Medical): No   Physical Activity: Sufficiently Active (2024)    Exercise Vital Sign     Days of Exercise per Week: 5 days     Minutes of Exercise per Session: 40 min   Stress: Stress Concern Present (2024)    [employer] - Occupational Stress Questionnaire     Feeling of Stress : To some extent   Housing Stability: Low Risk  (2024)    Housing Stability Vital Sign     Unable to Pay for Housing in the Last Year: No     Number of Places Lived in the Last Year: 1     Unstable Housing in the Last Year: No     Family History   Problem Relation Name Age of  "Onset    Cancer Mother      Diabetes Maternal Grandmother       Review of patient's allergies indicates:  No Known Allergies     Objective:  Vitals:    11/06/24 1106   BP: 121/83   Pulse: 60   Resp: 18   Weight: 103.4 kg (228 lb)   Height: 6' 2" (1.88 m)   PainSc:   7   PainLoc: Abdomen             Physical Exam  Vitals and nursing note reviewed. Exam conducted with a chaperone present.   Constitutional:       General: He is awake. He is not in acute distress.     Appearance: Normal appearance. He is not toxic-appearing or diaphoretic.   HENT:      Head: Normocephalic and atraumatic.      Nose: Nose normal.      Mouth/Throat:      Mouth: Mucous membranes are moist.      Pharynx: Oropharynx is clear.   Eyes:      Conjunctiva/sclera: Conjunctivae normal.      Pupils: Pupils are equal, round, and reactive to light.   Cardiovascular:      Rate and Rhythm: Normal rate.   Pulmonary:      Effort: Pulmonary effort is normal. No respiratory distress.   Abdominal:      Palpations: Abdomen is soft.      Tenderness: There is abdominal tenderness.   Musculoskeletal:      Cervical back: Normal range of motion and neck supple.      Thoracic back: Tenderness present.      Lumbar back: Tenderness present. Decreased range of motion.   Skin:     General: Skin is warm and dry.   Neurological:      General: No focal deficit present.      Mental Status: He is alert and oriented to person, place, and time. Mental status is at baseline.      Cranial Nerves: No cranial nerve deficit (II-XII).   Psychiatric:         Mood and Affect: Mood normal.         Behavior: Behavior normal. Behavior is cooperative.         Thought Content: Thought content normal.           No image results found.       Office Visit on 08/08/2024   Component Date Value Ref Range Status    POC Amphetamines 08/08/2024 Negative  Negative, Inconclusive Final    POC Barbiturates 08/08/2024 Negative  Negative, Inconclusive Final    POC Benzodiazepines 08/08/2024 Negative  " Negative, Inconclusive Final    POC Cocaine 08/08/2024 Negative  Negative, Inconclusive Final    POC THC 08/08/2024 Negative  Negative, Inconclusive Final    POC Methadone 08/08/2024 Negative  Negative, Inconclusive Final    POC Methamphetamine 08/08/2024 Negative  Negative, Inconclusive Final    POC Opiates 08/08/2024 Presumptive Positive (A)  Negative, Inconclusive Final    POC Oxycodone 08/08/2024 Negative  Negative, Inconclusive Final    POC Phencyclidine 08/08/2024 Negative  Negative, Inconclusive Final    POC Methylenedioxymethamphetamine * 08/08/2024 Negative  Negative, Inconclusive Final    POC Tricyclic Antidepressants 08/08/2024 Negative  Negative, Inconclusive Final    POC Buprenorphine 08/08/2024 Negative   Final     Acceptable 08/08/2024 Yes   Final    POC Temperature (Urine) 08/08/2024 94   Final   Office Visit on 06/11/2024   Component Date Value Ref Range Status    Creatinine, Urine 06/11/2024 179  39 - 259 mg/dL Final    Microalbumin 06/11/2024 0.6  0.0 - 2.8 mg/dL Final    Microalbumin/Creatinine Ratio 06/11/2024 3.4  0.0 - 30.0 mg/g Final    Triglycerides 06/11/2024 99  35 - 150 mg/dL Final    Cholesterol 06/11/2024 144  0 - 200 mg/dL Final    HDL Cholesterol 06/11/2024 50  40 - 60 mg/dL Final    Cholesterol/HDL Ratio (Risk Factor) 06/11/2024 2.9   Final    Non-HDL 06/11/2024 94  mg/dL Final    LDL Calculated 06/11/2024 74  mg/dL Final    LDL/HDL 06/11/2024 1.5   Final    VLDL 06/11/2024 20  mg/dL Final    Sodium 06/11/2024 138  136 - 145 mmol/L Final    Potassium 06/11/2024 3.9  3.5 - 5.1 mmol/L Final    Chloride 06/11/2024 106  98 - 107 mmol/L Final    CO2 06/11/2024 28  21 - 32 mmol/L Final    Anion Gap 06/11/2024 8  7 - 16 mmol/L Final    Glucose 06/11/2024 108 (H)  74 - 106 mg/dL Final    BUN 06/11/2024 24 (H)  7 - 18 mg/dL Final    Creatinine 06/11/2024 1.29  0.70 - 1.30 mg/dL Final    BUN/Creatinine Ratio 06/11/2024 19  6 - 20 Final    Calcium 06/11/2024 8.5  8.5 - 10.1 mg/dL  Final    Total Protein 06/11/2024 6.8  6.4 - 8.2 g/dL Final    Albumin 06/11/2024 3.5  3.5 - 5.0 g/dL Final    Globulin 06/11/2024 3.3  2.0 - 4.0 g/dL Final    A/G Ratio 06/11/2024 1.1   Final    Bilirubin, Total 06/11/2024 0.3  >0.0 - 1.2 mg/dL Final    Alk Phos 06/11/2024 51  45 - 115 U/L Final    ALT 06/11/2024 18  16 - 61 U/L Final    AST 06/11/2024 17  15 - 37 U/L Final    eGFR 06/11/2024 60  >=60 mL/min/1.73m2 Final    PSA Total 06/11/2024 0.392  <=4.000 ng/mL Final    WBC 06/11/2024 9.04  4.50 - 11.00 K/uL Final    RBC 06/11/2024 4.95  4.60 - 6.20 M/uL Final    Hemoglobin 06/11/2024 15.1  13.5 - 18.0 g/dL Final    Hematocrit 06/11/2024 46.8  40.0 - 54.0 % Final    MCV 06/11/2024 94.5  80.0 - 96.0 fL Final    MCH 06/11/2024 30.5  27.0 - 31.0 pg Final    MCHC 06/11/2024 32.3  32.0 - 36.0 g/dL Final    RDW 06/11/2024 12.6  11.5 - 14.5 % Final    Platelet Count 06/11/2024 206  150 - 400 K/uL Final    MPV 06/11/2024 11.3  9.4 - 12.4 fL Final    Neutrophils % 06/11/2024 58.0  53.0 - 65.0 % Final    Lymphocytes % 06/11/2024 26.8 (L)  27.0 - 41.0 % Final    Monocytes % 06/11/2024 8.7 (H)  2.0 - 6.0 % Final    Eosinophils % 06/11/2024 4.5 (H)  1.0 - 4.0 % Final    Basophils % 06/11/2024 1.3 (H)  0.0 - 1.0 % Final    Immature Granulocytes % 06/11/2024 0.7 (H)  0.0 - 0.4 % Final    nRBC, Auto 06/11/2024 0.0  <=0.0 % Final    Neutrophils, Abs 06/11/2024 5.24  1.80 - 7.70 K/uL Final    Lymphocytes, Absolute 06/11/2024 2.42  1.00 - 4.80 K/uL Final    Monocytes, Absolute 06/11/2024 0.79  0.00 - 0.80 K/uL Final    Eosinophils, Absolute 06/11/2024 0.41  0.00 - 0.50 K/uL Final    Basophils, Absolute 06/11/2024 0.12  0.00 - 0.20 K/uL Final    Immature Granulocytes, Absolute 06/11/2024 0.06 (H)  0.00 - 0.04 K/uL Final    nRBC, Absolute 06/11/2024 0.00  <=0.00 x10e3/uL Final    Diff Type 06/11/2024 Auto   Final   Office Visit on 05/08/2024   Component Date Value Ref Range Status    POC Amphetamines 05/08/2024 Negative  Negative,  Inconclusive Final    POC Barbiturates 05/08/2024 Negative  Negative, Inconclusive Final    POC Benzodiazepines 05/08/2024 Negative  Negative, Inconclusive Final    POC Cocaine 05/08/2024 Negative  Negative, Inconclusive Final    POC THC 05/08/2024 Negative  Negative, Inconclusive Final    POC Methadone 05/08/2024 Negative  Negative, Inconclusive Final    POC Methamphetamine 05/08/2024 Negative  Negative, Inconclusive Final    POC Opiates 05/08/2024 Presumptive Positive (A)  Negative, Inconclusive Final    POC Oxycodone 05/08/2024 Negative  Negative, Inconclusive Final    POC Phencyclidine 05/08/2024 Negative  Negative, Inconclusive Final    POC Methylenedioxymethamphetamine * 05/08/2024 Negative  Negative, Inconclusive Final    POC Tricyclic Antidepressants 05/08/2024 Negative  Negative, Inconclusive Final    POC Buprenorphine 05/08/2024 Negative   Final     Acceptable 05/08/2024 Yes   Final    POC Temperature (Urine) 05/08/2024 92   Final         Orders Placed This Encounter   Procedures    POCT Urine Drug Screen Presump     Interpretive Information:     Negative:  No drug detected at the cut off level.   Positive:  This result represents presumptive positive for the   tested drug, other substances may yield a positive response other   than the analyte of interest. This result should be utilized for   diagnostic purpose only. Confirmation testing will be performed upon physician request only.          Requested Prescriptions     Signed Prescriptions Disp Refills    HYDROcodone-acetaminophen (NORCO)  mg per tablet 120 tablet 0     Sig: Take 1 tablet by mouth every 6 (six) hours as needed for Pain (pain).    HYDROcodone-acetaminophen (NORCO)  mg per tablet 120 tablet 0     Sig: Take 1 tablet by mouth every 6 (six) hours as needed for Pain (pain).    HYDROcodone-acetaminophen (NORCO)  mg per tablet 120 tablet 0     Sig: Take 1 tablet by mouth every 6 (six) hours as needed for Pain  (pain).       Assessment:     1. Generalized abdominal pain    2. Cervical spondylosis without myelopathy    3. Encounter for long-term (current) use of medications             A's of Opioid Risk Assessment  Activity:Patient can perform ADL.   Analgesia:Patients pain is partially controlled by current medication. Patient has tried OTC medications such as Tylenol and Ibuprofen with out relief.   Adverse Effects: Patient denies constipation or sedation.  Aberrant Behavior:  reviewed with no aberrant drug seeking/taking behavior.  Overdose reversal drug naloxone discussed    Drug screen reviewed      Plan:    Narcan March 2023     Pharmacy closed on weekends      Chronic abdominal pain After mesh placement after surgery    He states current medication is helping with his chronic discomfort he would like to continue with current medication    Continue home exercise program as directed    Continue current medication    Follow-up 3 months    Dr. Kong, May 2025    Bring original prescription medication bottles/container/box with labels to each visit

## 2024-11-06 ENCOUNTER — OFFICE VISIT (OUTPATIENT)
Dept: PAIN MEDICINE | Facility: CLINIC | Age: 69
End: 2024-11-06
Payer: MEDICARE

## 2024-11-06 VITALS
SYSTOLIC BLOOD PRESSURE: 121 MMHG | DIASTOLIC BLOOD PRESSURE: 83 MMHG | RESPIRATION RATE: 18 BRPM | HEART RATE: 60 BPM | BODY MASS INDEX: 29.26 KG/M2 | WEIGHT: 228 LBS | HEIGHT: 74 IN

## 2024-11-06 DIAGNOSIS — R10.84 GENERALIZED ABDOMINAL PAIN: Primary | Chronic | ICD-10-CM

## 2024-11-06 DIAGNOSIS — M47.812 CERVICAL SPONDYLOSIS WITHOUT MYELOPATHY: Chronic | ICD-10-CM

## 2024-11-06 DIAGNOSIS — Z79.899 ENCOUNTER FOR LONG-TERM (CURRENT) USE OF MEDICATIONS: ICD-10-CM

## 2024-11-06 PROCEDURE — 99999 PR PBB SHADOW E&M-EST. PATIENT-LVL III: CPT | Mod: PBBFAC,,, | Performed by: PHYSICIAN ASSISTANT

## 2024-11-06 PROCEDURE — 80305 DRUG TEST PRSMV DIR OPT OBS: CPT | Mod: PBBFAC | Performed by: PHYSICIAN ASSISTANT

## 2024-11-06 PROCEDURE — 99213 OFFICE O/P EST LOW 20 MIN: CPT | Mod: PBBFAC | Performed by: PHYSICIAN ASSISTANT

## 2024-11-06 PROCEDURE — 99214 OFFICE O/P EST MOD 30 MIN: CPT | Mod: S$PBB,,, | Performed by: PHYSICIAN ASSISTANT

## 2024-11-06 PROCEDURE — 99999PBSHW POCT URINE DRUG SCREEN PRESUMP: Mod: PBBFAC,,,

## 2024-11-06 RX ORDER — HYDROCODONE BITARTRATE AND ACETAMINOPHEN 10; 325 MG/1; MG/1
1 TABLET ORAL EVERY 6 HOURS PRN
Qty: 120 TABLET | Refills: 0 | Status: SHIPPED | OUTPATIENT
Start: 2025-01-06 | End: 2025-02-05

## 2024-11-06 RX ORDER — HYDROCODONE BITARTRATE AND ACETAMINOPHEN 10; 325 MG/1; MG/1
1 TABLET ORAL EVERY 6 HOURS PRN
Qty: 120 TABLET | Refills: 0 | Status: SHIPPED | OUTPATIENT
Start: 2024-11-07 | End: 2024-12-07

## 2024-11-06 RX ORDER — HYDROCODONE BITARTRATE AND ACETAMINOPHEN 10; 325 MG/1; MG/1
1 TABLET ORAL EVERY 6 HOURS PRN
Qty: 120 TABLET | Refills: 0 | Status: SHIPPED | OUTPATIENT
Start: 2024-12-06 | End: 2025-01-05

## 2024-12-11 ENCOUNTER — OFFICE VISIT (OUTPATIENT)
Dept: FAMILY MEDICINE | Facility: CLINIC | Age: 69
End: 2024-12-11
Payer: MEDICARE

## 2024-12-11 VITALS
TEMPERATURE: 98 F | WEIGHT: 226 LBS | RESPIRATION RATE: 19 BRPM | BODY MASS INDEX: 29 KG/M2 | OXYGEN SATURATION: 100 % | DIASTOLIC BLOOD PRESSURE: 79 MMHG | SYSTOLIC BLOOD PRESSURE: 107 MMHG | HEART RATE: 77 BPM | HEIGHT: 74 IN

## 2024-12-11 DIAGNOSIS — G47.00 INSOMNIA, UNSPECIFIED TYPE: ICD-10-CM

## 2024-12-11 DIAGNOSIS — N18.31 CHRONIC KIDNEY DISEASE, STAGE 3A: ICD-10-CM

## 2024-12-11 DIAGNOSIS — R73.03 PREDIABETES: ICD-10-CM

## 2024-12-11 DIAGNOSIS — M47.812 CERVICAL SPONDYLOSIS WITHOUT MYELOPATHY: Chronic | ICD-10-CM

## 2024-12-11 DIAGNOSIS — I10 HYPERTENSION, UNSPECIFIED TYPE: Primary | ICD-10-CM

## 2024-12-11 DIAGNOSIS — E78.5 HYPERLIPIDEMIA, UNSPECIFIED HYPERLIPIDEMIA TYPE: ICD-10-CM

## 2024-12-11 DIAGNOSIS — F32.A DEPRESSION, UNSPECIFIED DEPRESSION TYPE: ICD-10-CM

## 2024-12-11 LAB
ALBUMIN SERPL BCP-MCNC: 3.8 G/DL (ref 3.4–4.8)
ALBUMIN/GLOB SERPL: 1.4 {RATIO}
ALP SERPL-CCNC: 51 U/L (ref 40–150)
ALT SERPL W P-5'-P-CCNC: 12 U/L
ANION GAP SERPL CALCULATED.3IONS-SCNC: 11 MMOL/L (ref 7–16)
AST SERPL W P-5'-P-CCNC: 21 U/L (ref 5–34)
BASOPHILS # BLD AUTO: 0.1 K/UL (ref 0–0.2)
BASOPHILS NFR BLD AUTO: 1.1 % (ref 0–1)
BILIRUB SERPL-MCNC: 0.4 MG/DL
BUN SERPL-MCNC: 13 MG/DL (ref 8–26)
BUN/CREAT SERPL: 12 (ref 6–20)
CALCIUM SERPL-MCNC: 8.8 MG/DL (ref 8.8–10)
CHLORIDE SERPL-SCNC: 101 MMOL/L (ref 98–107)
CHOLEST SERPL-MCNC: 146 MG/DL
CHOLEST/HDLC SERPL: 3.5 {RATIO}
CO2 SERPL-SCNC: 28 MMOL/L (ref 23–31)
CREAT SERPL-MCNC: 1.13 MG/DL (ref 0.72–1.25)
DIFFERENTIAL METHOD BLD: ABNORMAL
EGFR (NO RACE VARIABLE) (RUSH/TITUS): 70 ML/MIN/1.73M2
EOSINOPHIL # BLD AUTO: 0.29 K/UL (ref 0–0.5)
EOSINOPHIL NFR BLD AUTO: 3.3 % (ref 1–4)
ERYTHROCYTE [DISTWIDTH] IN BLOOD BY AUTOMATED COUNT: 12.4 % (ref 11.5–14.5)
EST. AVERAGE GLUCOSE BLD GHB EST-MCNC: 114 MG/DL
GLOBULIN SER-MCNC: 2.7 G/DL (ref 2–4)
GLUCOSE SERPL-MCNC: 105 MG/DL (ref 82–115)
HBA1C MFR BLD HPLC: 5.6 %
HCT VFR BLD AUTO: 46.3 % (ref 40–54)
HDLC SERPL-MCNC: 42 MG/DL (ref 35–60)
HGB BLD-MCNC: 14.3 G/DL (ref 13.5–18)
IMM GRANULOCYTES # BLD AUTO: 0.04 K/UL (ref 0–0.04)
IMM GRANULOCYTES NFR BLD: 0.5 % (ref 0–0.4)
LDLC SERPL CALC-MCNC: 85 MG/DL
LDLC/HDLC SERPL: 2 {RATIO}
LYMPHOCYTES # BLD AUTO: 1.71 K/UL (ref 1–4.8)
LYMPHOCYTES NFR BLD AUTO: 19.4 % (ref 27–41)
MCH RBC QN AUTO: 29.4 PG (ref 27–31)
MCHC RBC AUTO-ENTMCNC: 30.9 G/DL (ref 32–36)
MCV RBC AUTO: 95.3 FL (ref 80–96)
MONOCYTES # BLD AUTO: 0.76 K/UL (ref 0–0.8)
MONOCYTES NFR BLD AUTO: 8.6 % (ref 2–6)
MPC BLD CALC-MCNC: 11 FL (ref 9.4–12.4)
NEUTROPHILS # BLD AUTO: 5.93 K/UL (ref 1.8–7.7)
NEUTROPHILS NFR BLD AUTO: 67.1 % (ref 53–65)
NONHDLC SERPL-MCNC: 104 MG/DL
NRBC # BLD AUTO: 0 X10E3/UL
NRBC, AUTO (.00): 0 %
PLATELET # BLD AUTO: 244 K/UL (ref 150–400)
POTASSIUM SERPL-SCNC: 4.3 MMOL/L (ref 3.5–5.1)
PROT SERPL-MCNC: 6.5 G/DL (ref 5.8–7.6)
RBC # BLD AUTO: 4.86 M/UL (ref 4.6–6.2)
SODIUM SERPL-SCNC: 136 MMOL/L (ref 136–145)
TRIGL SERPL-MCNC: 95 MG/DL (ref 34–140)
VLDLC SERPL-MCNC: 19 MG/DL
WBC # BLD AUTO: 8.83 K/UL (ref 4.5–11)

## 2024-12-11 PROCEDURE — 83036 HEMOGLOBIN GLYCOSYLATED A1C: CPT | Mod: ,,, | Performed by: CLINICAL MEDICAL LABORATORY

## 2024-12-11 PROCEDURE — 85025 COMPLETE CBC W/AUTO DIFF WBC: CPT | Mod: ,,, | Performed by: CLINICAL MEDICAL LABORATORY

## 2024-12-11 PROCEDURE — 99214 OFFICE O/P EST MOD 30 MIN: CPT | Mod: ,,, | Performed by: NURSE PRACTITIONER

## 2024-12-11 PROCEDURE — 80061 LIPID PANEL: CPT | Mod: ,,, | Performed by: CLINICAL MEDICAL LABORATORY

## 2024-12-11 PROCEDURE — 80053 COMPREHEN METABOLIC PANEL: CPT | Mod: ,,, | Performed by: CLINICAL MEDICAL LABORATORY

## 2024-12-11 RX ORDER — LOSARTAN POTASSIUM AND HYDROCHLOROTHIAZIDE 12.5; 5 MG/1; MG/1
1 TABLET ORAL DAILY
Qty: 90 TABLET | Refills: 1 | Status: SHIPPED | OUTPATIENT
Start: 2024-12-11

## 2024-12-11 RX ORDER — TRAZODONE HYDROCHLORIDE 150 MG/1
150 TABLET ORAL NIGHTLY
Qty: 90 TABLET | Refills: 1 | Status: SHIPPED | OUTPATIENT
Start: 2024-12-11

## 2024-12-11 RX ORDER — LOVASTATIN 40 MG/1
40 TABLET ORAL NIGHTLY
Qty: 90 TABLET | Refills: 1 | Status: SHIPPED | OUTPATIENT
Start: 2024-12-11

## 2024-12-11 RX ORDER — CYCLOBENZAPRINE HCL 10 MG
10 TABLET ORAL 3 TIMES DAILY
Qty: 270 TABLET | Refills: 1 | Status: SHIPPED | OUTPATIENT
Start: 2024-12-11

## 2024-12-11 RX ORDER — ATENOLOL 25 MG/1
25 TABLET ORAL DAILY
Qty: 90 TABLET | Refills: 1 | Status: SHIPPED | OUTPATIENT
Start: 2024-12-11

## 2024-12-11 RX ORDER — ESCITALOPRAM OXALATE 20 MG/1
20 TABLET ORAL DAILY
Qty: 90 TABLET | Refills: 1 | Status: SHIPPED | OUTPATIENT
Start: 2024-12-11

## 2024-12-11 NOTE — PROGRESS NOTES
SOFIA Garcia   [facility]  [address]  [place] [address]     PATIENT NAME: Simon Voss  : 1955  DATE: 24  MRN: [id number]      Billing Provider: SOFIA Garcia  Level of Service:   Patient PCP Information       Provider PCP Type    SOFIA Garcia General            Reason for Visit / Chief Complaint: Hypertension (Pt is here for his 6 month check up. He will need refills and labs. Pt declines care gaps. He said that he had a scope done in 2019 but [place]   Health Maintenance Due   Topic Date Due    Hepatitis C Screening  Never done    Colorectal Cancer Screening  Never done    Shingles Vaccine (1 of 2) Never done    RSV Vaccine (Age 60+ and Pregnant patients) (1 - Risk 60-74 years 1-dose series) Never done    Abdominal Aortic Aneurysm Screening  Never done    COVID-19 Vaccine (2024- season) Never done    Hemoglobin A1c (Prediabetes)  2024          Update PCP  Update Chief Complaint         History of Present Illness / Problem Focused Workflow     History of Present Illness    CHIEF COMPLAINT:  Patient presents today for follow up and medication refills.    CURRENT HEALTH STATUS:  He reports no new health concerns. He denies swelling in legs or feet and chest pain.    PAIN MANAGEMENT:  He continues to attend pain management appointments with NANDO Null.     PREVENTIVE CARE:  He reports being informed that he does not need a colonoscopy until age 74.      ROS:  General: -fever, -chills, -fatigue, -weight gain, -weight loss  Eyes: -vision changes, -redness, -discharge  ENT: -ear pain, -nasal congestion, -sore throat  Cardiovascular: -chest pain, -palpitations, -lower extremity edema  Respiratory: -cough, -shortness of breath  Gastrointestinal: -abdominal pain, -nausea, -vomiting, -diarrhea, -constipation, -blood in stool  Genitourinary: -dysuria, -hematuria,  -frequency  Musculoskeletal: -joint pain, -muscle pain  Skin: -rash, -lesion  Neurological: -headache, -dizziness, -numbness, -tingling  Psychiatric: -anxiety, -depression, -sleep difficulty          Hemoglobin A1C   Date Value Ref Range Status   12/13/2023 5.6 4.5 - 6.6 % Final     Comment:       Normal:               <5.7%  Pre-Diabetic:       5.7% to 6.4%  Diabetic:             >6.4%  Diabetic Goal:     <7%   06/06/2023 5.7 4.5 - 6.6 % Final     Comment:       Normal:               <5.7%  Pre-Diabetic:       5.7% to 6.4%  Diabetic:             >6.4%  Diabetic Goal:     <7%   02/11/2022 5.8 4.5 - 6.6 % Final     Comment:       Normal:               <5.7%  Pre-Diabetic:       5.7% to 6.4%  Diabetic:             >6.4%  Diabetic Goal:     <7%        CMP  Sodium   Date Value Ref Range Status   06/11/2024 138 136 - 145 mmol/L Final     Potassium   Date Value Ref Range Status   06/11/2024 3.9 3.5 - 5.1 mmol/L Final     Chloride   Date Value Ref Range Status   06/11/2024 106 98 - 107 mmol/L Final     CO2   Date Value Ref Range Status   06/11/2024 28 21 - 32 mmol/L Final     Glucose   Date Value Ref Range Status   06/11/2024 108 (H) 74 - 106 mg/dL Final   02/09/2021 98 mg/dL Final     BUN   Date Value Ref Range Status   06/11/2024 24 (H) 7 - 18 mg/dL Final     Creatinine   Date Value Ref Range Status   06/11/2024 1.29 0.70 - 1.30 mg/dL Final     Calcium   Date Value Ref Range Status   06/11/2024 8.5 8.5 - 10.1 mg/dL Final     Total Protein   Date Value Ref Range Status   06/11/2024 6.8 6.4 - 8.2 g/dL Final     Albumin   Date Value Ref Range Status   06/11/2024 3.5 3.5 - 5.0 g/dL Final     Bilirubin, Total   Date Value Ref Range Status   06/11/2024 0.3 >0.0 - 1.2 mg/dL Final     Alk Phos   Date Value Ref Range Status   06/11/2024 51 45 - 115 U/L Final     AST   Date Value Ref Range Status   06/11/2024 17 15 - 37 U/L Final     ALT   Date Value Ref Range Status   06/11/2024 18 16 - 61 U/L Final     Anion Gap   Date Value Ref  Range Status   06/11/2024 8 7 - 16 mmol/L Final     eGFR   Date Value Ref Range Status   06/11/2024 60 >=60 mL/min/1.73m2 Final        Lab Results   Component Value Date    WBC 9.04 06/11/2024    RBC 4.95 06/11/2024    HGB 15.1 06/11/2024    HCT 46.8 06/11/2024    MCV 94.5 06/11/2024    MCH 30.5 06/11/2024    MCHC 32.3 06/11/2024    RDW 12.6 06/11/2024     06/11/2024    MPV 11.3 06/11/2024    LYMPH 26.8 (L) 06/11/2024    LYMPH 2.42 06/11/2024    MONO 8.7 (H) 06/11/2024    EOS 0.41 06/11/2024    BASO 0.12 06/11/2024    EOSINOPHIL 4.5 (H) 06/11/2024    BASOPHIL 1.3 (H) 06/11/2024        Lab Results   Component Value Date    CHOL 144 06/11/2024    CHOL 172 12/13/2023    CHOL 161 02/08/2023     Lab Results   Component Value Date    HDL 50 06/11/2024    HDL 52 12/13/2023    HDL 53 02/08/2023     Lab Results   Component Value Date    LDLCALC 74 06/11/2024    LDLCALC 100 12/13/2023    LDLCALC 91 02/08/2023     Lab Results   Component Value Date    TRIG 99 06/11/2024    TRIG 99 12/13/2023    TRIG 86 02/08/2023     Lab Results   Component Value Date    CHOLHDL 2.9 06/11/2024    CHOLHDL 3.3 12/13/2023    CHOLHDL 3.0 02/08/2023        Wt Readings from Last 3 Encounters:   12/11/24 0835 102.5 kg (226 lb)   11/06/24 1106 103.4 kg (228 lb)   08/08/24 1014 102.5 kg (226 lb)        BP Readings from Last 3 Encounters:   12/11/24 107/79   11/06/24 121/83   08/08/24 106/75        Review of Systems     Review of Systems       Medical / Social / Family History     Past Medical History:   Diagnosis Date    Anxiety     Cervical spondylosis without myelopathy     Chronic pain syndrome     Depression     Generalized abdominal pain     High cholesterol     Hypertension        Past Surgical History:   Procedure Laterality Date    APPENDECTOMY         Social History    reports that he quit smoking about 50 years ago. His smoking use included cigarettes. He started smoking about 64 years ago. He has a 7 pack-year smoking history. He  "has been exposed to tobacco smoke. He quit smokeless tobacco use about 14 months ago.  His smokeless tobacco use included snuff and chew. He reports that he does not currently use alcohol. He reports current drug use. Drug: Hydrocodone.    Family History  's family history includes Cancer in his mother; Diabetes in his maternal grandmother.    Medications and Allergies     Medications  Outpatient Medications Marked as Taking for the 12/11/24 encounter (Office Visit) with Terri Galeana FNP   Medication Sig Dispense Refill    HYDROcodone-acetaminophen (NORCO)  mg per tablet Take 1 tablet by mouth every 6 (six) hours as needed for Pain (pain). 120 tablet 0    [START ON 1/6/2025] HYDROcodone-acetaminophen (NORCO)  mg per tablet Take 1 tablet by mouth every 6 (six) hours as needed for Pain (pain). 120 tablet 0    [DISCONTINUED] atenoloL (TENORMIN) 25 MG tablet Take 1 tablet (25 mg total) by mouth once daily. 90 tablet 1    [DISCONTINUED] cyclobenzaprine (FLEXERIL) 10 MG tablet Take 1 tablet (10 mg total) by mouth 3 (three) times daily. 270 tablet 1    [DISCONTINUED] EScitalopram oxalate (LEXAPRO) 20 MG tablet Take 1 tablet (20 mg total) by mouth once daily. 90 tablet 1    [DISCONTINUED] losartan-hydrochlorothiazide 50-12.5 mg (HYZAAR) 50-12.5 mg per tablet Take 1 tablet by mouth once daily. 90 tablet 1    [DISCONTINUED] lovastatin (MEVACOR) 40 MG tablet Take 1 tablet (40 mg total) by mouth nightly. 90 tablet 1    [DISCONTINUED] traZODone (DESYREL) 150 MG tablet Take 1 tablet (150 mg total) by mouth every evening. 90 tablet 1       Allergies  Review of patient's allergies indicates:  No Known Allergies    Physical Examination     Vitals:    12/11/24 0835   BP: 107/79   BP Location: Left arm   Patient Position: Sitting   Pulse: 77   Resp: 19   Temp: 98.2 °F (36.8 °C)   TempSrc: Oral   SpO2: 100%   Weight: 102.5 kg (226 lb)   Height: 6' 2" (1.88 m)      Physical Exam  Constitutional:       Appearance: Normal " appearance.   HENT:      Head: Normocephalic.   Eyes:      Extraocular Movements: Extraocular movements intact.   Cardiovascular:      Rate and Rhythm: Normal rate and regular rhythm.      Pulses: Normal pulses.      Heart sounds: Normal heart sounds.   Pulmonary:      Effort: Pulmonary effort is normal.      Breath sounds: Normal breath sounds.   Skin:     General: Skin is warm and dry.      Capillary Refill: Capillary refill takes less than 2 seconds.   Neurological:      General: No focal deficit present.      Mental Status: He is alert and oriented to person, place, and time.   Psychiatric:         Mood and Affect: Mood normal.         Behavior: Behavior normal.          Assessment and Plan (including Health Maintenance)      Problem List  Smart Sets  Document Outside HM   :    Plan:     There are no Patient Instructions on file for this visit.       Health Maintenance Due   Topic Date Due    Hepatitis C Screening  Never done    Colorectal Cancer Screening  Never done    Shingles Vaccine (1 of 2) Never done    RSV Vaccine (Age 60+ and Pregnant patients) (1 - Risk 60-74 years 1-dose series) Never done    Abdominal Aortic Aneurysm Screening  Never done    COVID-19 Vaccine (1 - 2024-25 season) Never done    Hemoglobin A1c (Prediabetes)  12/13/2024       Problem List Items Addressed This Visit       Cervical spondylosis without myelopathy (Chronic)    Current Assessment & Plan     Spondylosis is controlled. Current medical regimen is effective;   Will adjust according to results and monitor.          Relevant Medications    cyclobenzaprine (FLEXERIL) 10 MG tablet    Chronic kidney disease, stage 3a    Current Assessment & Plan     Labs pending. Will inform of results when available.          Depression    Current Assessment & Plan     Depression is controlled. Current medical regimen is effective;   Will adjust according to results and monitor.          Relevant Medications    EScitalopram oxalate (LEXAPRO) 20 MG  tablet    Hyperlipidemia    Current Assessment & Plan     Labs pending. Will inform of results when available.          Relevant Medications    lovastatin (MEVACOR) 40 MG tablet    Other Relevant Orders    CBC Auto Differential    Comprehensive Metabolic Panel    Lipid Panel    Hypertension - Primary    Current Assessment & Plan     Blood pressure is controlled. Current medical regimen is effective;   Will adjust according to results and monitor.     1) Check your blood pressure at home. Please notify me at the clinic if the systolic (top number) is consistently over 140 or under 110 or if the diastolic (bottom number) is consistently over 90 or under 60.  2) Regular aerobic physical activity ( e.g. brisk walking) at least 30 min 5 out of 7 days of the week  3) Low sodium diet.  4) Take your meds as directed  5) To the ER for any signs of chest pain/tightness/palpitations/shortness of breath/difficulty speaking/numbness or tingling in face or extremities  6) Have yearly eye exams           Relevant Medications    atenoloL (TENORMIN) 25 MG tablet    losartan-hydrochlorothiazide 50-12.5 mg (HYZAAR) 50-12.5 mg per tablet    Other Relevant Orders    CBC Auto Differential    Comprehensive Metabolic Panel    Insomnia    Overview     trazadone effective         Current Assessment & Plan     Insomnia  is controlled. Current medical regimen is effective;   Will adjust according to results and monitor.          Relevant Medications    traZODone (DESYREL) 150 MG tablet    Prediabetes    Current Assessment & Plan     Labs pending. Will inform of results when available.          Relevant Orders    CBC Auto Differential    Comprehensive Metabolic Panel    Hemoglobin A1C     Assessment & Plan    IMPRESSION:  - Assessed patient's overall health status, including cardiovascular health and pain management needs  - Evaluated for potential cardiovascular issues, including checking for leg swelling and inquiring about chest pain  -  Confirmed patient's colonoscopy schedule as previously discussed    CHRONIC KIDNEY DISEASE, STAGE 3A:  - Performed physical exam, including heart auscultation and checking for edema in legs and feet.  - Ordered labs to assess kidney function; will review results the following day and contact patient with findings.  - Scheduled 6-month follow-up visit and refilled medications as needed for kidney disease management.    HEALTH ASSESSMENT:  - Conducted a comprehensive physical exam, including pulmonary auscultation.  - Evaluated the patient's overall health status and inquired about any new health concerns, ongoing pain management, and chest pain.    FOLLOW-UP:  - Followed up on previous colonoscopy recommendation.         Hypertension, unspecified type  -     CBC Auto Differential; Future; Expected date: 12/11/2024  -     Comprehensive Metabolic Panel; Future; Expected date: 12/11/2024  -     atenoloL (TENORMIN) 25 MG tablet; Take 1 tablet (25 mg total) by mouth once daily.  Dispense: 90 tablet; Refill: 1  -     losartan-hydrochlorothiazide 50-12.5 mg (HYZAAR) 50-12.5 mg per tablet; Take 1 tablet by mouth once daily.  Dispense: 90 tablet; Refill: 1    Hyperlipidemia, unspecified hyperlipidemia type  -     CBC Auto Differential; Future; Expected date: 12/11/2024  -     Comprehensive Metabolic Panel; Future; Expected date: 12/11/2024  -     Lipid Panel; Future; Expected date: 12/11/2024  -     lovastatin (MEVACOR) 40 MG tablet; Take 1 tablet (40 mg total) by mouth nightly.  Dispense: 90 tablet; Refill: 1    Prediabetes  -     CBC Auto Differential; Future; Expected date: 12/11/2024  -     Comprehensive Metabolic Panel; Future; Expected date: 12/11/2024  -     Hemoglobin A1C; Future; Expected date: 12/11/2024    Cervical spondylosis without myelopathy  -     cyclobenzaprine (FLEXERIL) 10 MG tablet; Take 1 tablet (10 mg total) by mouth 3 (three) times daily.  Dispense: 270 tablet; Refill: 1    Depression, unspecified  depression type  -     EScitalopram oxalate (LEXAPRO) 20 MG tablet; Take 1 tablet (20 mg total) by mouth once daily.  Dispense: 90 tablet; Refill: 1    Insomnia, unspecified type  Comments:  trazadone effective  Orders:  -     traZODone (DESYREL) 150 MG tablet; Take 1 tablet (150 mg total) by mouth every evening.  Dispense: 90 tablet; Refill: 1    Chronic kidney disease, stage 3a       Health Maintenance Topics with due status: Not Due       Topic Last Completion Date    TETANUS VACCINE 11/10/2020    PROSTATE-SPECIFIC ANTIGEN 06/11/2024    Lipid Panel 06/11/2024         Future Appointments   Date Time Provider Department Center   2/3/2025 10:45 AM [facility]   2/19/2025 10:00 AM AWV [facility]   6/11/2025  8:20 AM Terri Galeana FNP [facility]        Follow up in about 6 months (around 6/11/2025), or if symptoms worsen or fail to improve.    Health Maintenance Due   Topic Date Due    Hepatitis C Screening  Never done    Colorectal Cancer Screening  Never done    Shingles Vaccine (1 of 2) Never done    RSV Vaccine (Age 60+ and Pregnant patients) (1 - Risk 60-74 years 1-dose series) Never done    Abdominal Aortic Aneurysm Screening  Never done    COVID-19 Vaccine (1 - 2024-25 season) Never done    Hemoglobin A1c (Prediabetes)  12/13/2024        Signature:  SOFIA Garcia    Date of encounter: 12/11/24  This note was generated with the assistance of ambient listening technology. Verbal consent was obtained by the patient and accompanying visitor(s) for the recording of patient appointment to facilitate this note. I attest to having reviewed and edited the generated note for accuracy, though some syntax or spelling errors may persist. Please contact the author of this note for any clarification.

## 2025-01-27 DIAGNOSIS — R10.84 GENERALIZED ABDOMINAL PAIN: Chronic | ICD-10-CM

## 2025-01-27 DIAGNOSIS — M47.812 CERVICAL SPONDYLOSIS WITHOUT MYELOPATHY: Chronic | ICD-10-CM

## 2025-01-28 RX ORDER — HYDROCODONE BITARTRATE AND ACETAMINOPHEN 10; 325 MG/1; MG/1
1 TABLET ORAL EVERY 6 HOURS PRN
Qty: 120 TABLET | Refills: 0 | OUTPATIENT
Start: 2025-01-28

## 2025-01-28 NOTE — PROGRESS NOTES
Subjective:         Patient ID: Simon Voss is a 69 y.o. male.    Chief Complaint: Abdominal Pain      Pain  This is a chronic problem. The current episode started more than 1 year ago. The problem occurs daily. The problem has been waxing and waning. Associated symptoms include abdominal pain, arthralgias and neck pain. Pertinent negatives include no chest pain, chills, diaphoresis, fever, headaches, swollen glands, urinary symptoms or vertigo.     Review of Systems   Constitutional:  Negative for activity change, appetite change, chills, diaphoresis and fever.   HENT:  Negative for drooling, ear discharge, ear pain, facial swelling, mouth sores, nosebleeds, trouble swallowing, voice change and goiter.    Eyes:  Negative for photophobia, pain, discharge, redness and visual disturbance.   Respiratory:  Negative for apnea, choking, chest tightness, shortness of breath, wheezing and stridor.    Cardiovascular:  Negative for chest pain, palpitations and leg swelling.   Gastrointestinal:  Positive for abdominal pain. Negative for abdominal distention, diarrhea, rectal pain and fecal incontinence.   Endocrine: Negative for cold intolerance, heat intolerance, polydipsia, polyphagia and polyuria.   Genitourinary:  Negative for bladder incontinence, dysuria, flank pain and frequency.   Musculoskeletal:  Positive for arthralgias, back pain, leg pain, neck pain and neck stiffness.   Integumentary:  Negative for color change and pallor.   Neurological:  Negative for dizziness, vertigo, seizures, syncope, facial asymmetry, speech difficulty, light-headedness, headaches, memory loss and coordination difficulties.   Hematological:  Negative for adenopathy. Does not bruise/bleed easily.   Psychiatric/Behavioral:  Negative for agitation, behavioral problems, confusion, decreased concentration, hallucinations, self-injury and suicidal ideas. The patient is not nervous/anxious and is not hyperactive.            Past Medical  History:   Diagnosis Date    Anxiety     Cervical spondylosis without myelopathy     Chronic pain syndrome     Depression     Generalized abdominal pain     High cholesterol     Hypertension      Past Surgical History:   Procedure Laterality Date    APPENDECTOMY       Social History     Socioeconomic History    Marital status:    Tobacco Use    Smoking status: Former     Current packs/day: 0.00     Average packs/day: 0.5 packs/day for 14.0 years (7.0 ttl pk-yrs)     Types: Cigarettes     Start date:      Quit date:      Years since quittin.1     Passive exposure: Past    Smokeless tobacco: Former     Types: Snuff, Chew     Quit date: 2023    Tobacco comments:     1/2 pack daily   Substance and Sexual Activity    Alcohol use: Not Currently    Drug use: Yes     Types: Hydrocodone    Sexual activity: Not Currently     Social Drivers of Health     Financial Resource Strain: Low Risk  (2024)    Overall Financial Resource Strain (CARDIA)     Difficulty of Paying Living Expenses: Not hard at all   Food Insecurity: No Food Insecurity (2024)    Hunger Vital Sign     Worried About Running Out of Food in the Last Year: Never true     Ran Out of Food in the Last Year: Never true   Transportation Needs: No Transportation Needs (2024)    PRAPARE - Transportation     Lack of Transportation (Medical): No     Lack of Transportation (Non-Medical): No   Physical Activity: Sufficiently Active (2024)    Exercise Vital Sign     Days of Exercise per Week: 5 days     Minutes of Exercise per Session: 40 min   Stress: Stress Concern Present (2024)    [employer] - Occupational Stress Questionnaire     Feeling of Stress : To some extent   Housing Stability: Low Risk  (2024)    Housing Stability Vital Sign     Unable to Pay for Housing in the Last Year: No     Number of Places Lived in the Last Year: 1     Unstable Housing in the Last Year: No     Family  "History   Problem Relation Name Age of Onset    Cancer Mother      Diabetes Maternal Grandmother       Review of patient's allergies indicates:  No Known Allergies     Objective:  Vitals:    02/03/25 1107 02/03/25 1108   BP: 127/75    Pulse: 61    Resp: 18    Weight: 102.1 kg (225 lb)    Height: 6' 2" (1.88 m)    PainSc:   7   7               Physical Exam  Vitals and nursing note reviewed. Exam conducted with a chaperone present.   Constitutional:       General: He is awake. He is not in acute distress.     Appearance: Normal appearance. He is not toxic-appearing or diaphoretic.   HENT:      Head: Normocephalic and atraumatic.      Nose: Nose normal.      Mouth/Throat:      Mouth: Mucous membranes are moist.      Pharynx: Oropharynx is clear.   Eyes:      Conjunctiva/sclera: Conjunctivae normal.      Pupils: Pupils are equal, round, and reactive to light.   Cardiovascular:      Rate and Rhythm: Normal rate.   Pulmonary:      Effort: Pulmonary effort is normal. No respiratory distress.   Abdominal:      Palpations: Abdomen is soft.      Tenderness: There is abdominal tenderness.   Musculoskeletal:      Cervical back: Normal range of motion and neck supple.      Thoracic back: Tenderness present.      Lumbar back: Tenderness present. Decreased range of motion.   Skin:     General: Skin is warm and dry.   Neurological:      General: No focal deficit present.      Mental Status: He is alert and oriented to person, place, and time. Mental status is at baseline.      Cranial Nerves: No cranial nerve deficit (II-XII).   Psychiatric:         Mood and Affect: Mood normal.         Behavior: Behavior normal. Behavior is cooperative.         Thought Content: Thought content normal.           No image results found.       Office Visit on 12/11/2024   Component Date Value Ref Range Status    Sodium 12/11/2024 136  136 - 145 mmol/L Final    Potassium 12/11/2024 4.3  3.5 - 5.1 mmol/L Final    Chloride 12/11/2024 101  98 - 107 " mmol/L Final    CO2 12/11/2024 28  23 - 31 mmol/L Final    Anion Gap 12/11/2024 11  7 - 16 mmol/L Final    Glucose 12/11/2024 105  82 - 115 mg/dL Final    BUN 12/11/2024 13  8 - 26 mg/dL Final    Creatinine 12/11/2024 1.13  0.72 - 1.25 mg/dL Final    BUN/Creatinine Ratio 12/11/2024 12  6 - 20 Final    Calcium 12/11/2024 8.8  8.8 - 10.0 mg/dL Final    Total Protein 12/11/2024 6.5  5.8 - 7.6 g/dL Final    Albumin 12/11/2024 3.8  3.4 - 4.8 g/dL Final    Globulin 12/11/2024 2.7  2.0 - 4.0 g/dL Final    A/G Ratio 12/11/2024 1.4   Final    Bilirubin, Total 12/11/2024 0.4  <=1.5 mg/dL Final    Alk Phos 12/11/2024 51  40 - 150 U/L Final    ALT 12/11/2024 12  <=55 U/L Final    AST 12/11/2024 21  5 - 34 U/L Final    eGFR 12/11/2024 70  >=60 mL/min/1.73m2 Final    Triglycerides 12/11/2024 95  34 - 140 mg/dL Final    Cholesterol 12/11/2024 146  <=200 mg/dL Final    HDL Cholesterol 12/11/2024 42  35 - 60 mg/dL Final    Cholesterol/HDL Ratio (Risk Factor) 12/11/2024 3.5   Final    Non-HDL 12/11/2024 104  mg/dL Final    LDL Calculated 12/11/2024 85  mg/dL Final    LDL/HDL 12/11/2024 2.0   Final    VLDL 12/11/2024 19  mg/dL Final    Hemoglobin A1C 12/11/2024 5.6  <=7.0 % Final    Estimated Average Glucose 12/11/2024 114  mg/dL Final    WBC 12/11/2024 8.83  4.50 - 11.00 K/uL Final    RBC 12/11/2024 4.86  4.60 - 6.20 M/uL Final    Hemoglobin 12/11/2024 14.3  13.5 - 18.0 g/dL Final    Hematocrit 12/11/2024 46.3  40.0 - 54.0 % Final    MCV 12/11/2024 95.3  80.0 - 96.0 fL Final    MCH 12/11/2024 29.4  27.0 - 31.0 pg Final    MCHC 12/11/2024 30.9 (L)  32.0 - 36.0 g/dL Final    RDW 12/11/2024 12.4  11.5 - 14.5 % Final    Platelet Count 12/11/2024 244  150 - 400 K/uL Final    MPV 12/11/2024 11.0  9.4 - 12.4 fL Final    Neutrophils % 12/11/2024 67.1 (H)  53.0 - 65.0 % Final    Lymphocytes % 12/11/2024 19.4 (L)  27.0 - 41.0 % Final    Monocytes % 12/11/2024 8.6 (H)  2.0 - 6.0 % Final    Eosinophils % 12/11/2024 3.3  1.0 - 4.0 % Final     Basophils % 12/11/2024 1.1 (H)  0.0 - 1.0 % Final    Immature Granulocytes % 12/11/2024 0.5 (H)  0.0 - 0.4 % Final    nRBC, Auto 12/11/2024 0.0  <=0.0 % Final    Neutrophils, Abs 12/11/2024 5.93  1.80 - 7.70 K/uL Final    Lymphocytes, Absolute 12/11/2024 1.71  1.00 - 4.80 K/uL Final    Monocytes, Absolute 12/11/2024 0.76  0.00 - 0.80 K/uL Final    Eosinophils, Absolute 12/11/2024 0.29  0.00 - 0.50 K/uL Final    Basophils, Absolute 12/11/2024 0.10  0.00 - 0.20 K/uL Final    Immature Granulocytes, Absolute 12/11/2024 0.04  0.00 - 0.04 K/uL Final    nRBC, Absolute 12/11/2024 0.00  <=0.00 x10e3/uL Final    Diff Type 12/11/2024 Auto   Final   Office Visit on 11/06/2024   Component Date Value Ref Range Status    POC Amphetamines 11/06/2024 Negative  Negative, Inconclusive Final    POC Barbiturates 11/06/2024 Negative  Negative, Inconclusive Final    POC Benzodiazepines 11/06/2024 Negative  Negative, Inconclusive Final    POC Cocaine 11/06/2024 Negative  Negative, Inconclusive Final    POC THC 11/06/2024 Negative  Negative, Inconclusive Final    POC Methadone 11/06/2024 Negative  Negative, Inconclusive Final    POC Methamphetamine 11/06/2024 Negative  Negative, Inconclusive Final    POC Opiates 11/06/2024 Presumptive Positive (A)  Negative, Inconclusive Final    POC Oxycodone 11/06/2024 Negative  Negative, Inconclusive Final    POC Phencyclidine 11/06/2024 Negative  Negative, Inconclusive Final    POC Methylenedioxymethamphetamine * 11/06/2024 Negative  Negative, Inconclusive Final    POC Tricyclic Antidepressants 11/06/2024 Negative  Negative, Inconclusive Final    POC Buprenorphine 11/06/2024 Negative   Final     Acceptable 11/06/2024 Yes   Final    POC Temperature (Urine) 11/06/2024 94   Final   Office Visit on 08/08/2024   Component Date Value Ref Range Status    POC Amphetamines 08/08/2024 Negative  Negative, Inconclusive Final    POC Barbiturates 08/08/2024 Negative  Negative, Inconclusive Final    POC  Benzodiazepines 08/08/2024 Negative  Negative, Inconclusive Final    POC Cocaine 08/08/2024 Negative  Negative, Inconclusive Final    POC THC 08/08/2024 Negative  Negative, Inconclusive Final    POC Methadone 08/08/2024 Negative  Negative, Inconclusive Final    POC Methamphetamine 08/08/2024 Negative  Negative, Inconclusive Final    POC Opiates 08/08/2024 Presumptive Positive (A)  Negative, Inconclusive Final    POC Oxycodone 08/08/2024 Negative  Negative, Inconclusive Final    POC Phencyclidine 08/08/2024 Negative  Negative, Inconclusive Final    POC Methylenedioxymethamphetamine * 08/08/2024 Negative  Negative, Inconclusive Final    POC Tricyclic Antidepressants 08/08/2024 Negative  Negative, Inconclusive Final    POC Buprenorphine 08/08/2024 Negative   Final     Acceptable 08/08/2024 Yes   Final    POC Temperature (Urine) 08/08/2024 94   Final         Orders Placed This Encounter   Procedures    POCT Urine Drug Screen Presump     Interpretive Information:     Negative:  No drug detected at the cut off level.   Positive:  This result represents presumptive positive for the   tested drug, other substances may yield a positive response other   than the analyte of interest. This result should be utilized for   diagnostic purpose only. Confirmation testing will be performed upon physician request only.          Requested Prescriptions     Signed Prescriptions Disp Refills    HYDROcodone-acetaminophen (NORCO)  mg per tablet 120 tablet 0     Sig: Take 1 tablet by mouth every 6 (six) hours as needed for Pain (pain).    HYDROcodone-acetaminophen (NORCO)  mg per tablet 120 tablet 0     Sig: Take 1 tablet by mouth every 6 (six) hours as needed for Pain (pain).    HYDROcodone-acetaminophen (NORCO)  mg per tablet 120 tablet 0     Sig: Take 1 tablet by mouth every 6 (six) hours as needed for Pain (pain).       Assessment:     1. Cervical spondylosis without myelopathy    2. Generalized  abdominal pain    3. Encounter for long-term (current) use of medications               A's of Opioid Risk Assessment  Activity:Patient can perform ADL.   Analgesia:Patients pain is partially controlled by current medication. Patient has tried OTC medications such as Tylenol and Ibuprofen with out relief.   Adverse Effects: Patient denies constipation or sedation.  Aberrant Behavior:  reviewed with no aberrant drug seeking/taking behavior.  Overdose reversal drug naloxone discussed    Drug screen reviewed      Plan:    Use April 6, 2025 refill date next visit    Narcan March 2023     Pharmacy closed on weekends      Chronic abdominal pain After mesh placement after surgery    No new complaints Current medications helping control his chronic discomfort    He would like to continue with current medication    Continue home exercise program as directed    Continue current medication    Follow-up 3 months    Dr. Kong, May 2025    Bring original prescription medication bottles/container/box with labels to each visit

## 2025-02-03 ENCOUNTER — OFFICE VISIT (OUTPATIENT)
Dept: PAIN MEDICINE | Facility: CLINIC | Age: 70
End: 2025-02-03
Payer: MEDICARE

## 2025-02-03 VITALS
HEART RATE: 61 BPM | DIASTOLIC BLOOD PRESSURE: 75 MMHG | HEIGHT: 74 IN | BODY MASS INDEX: 28.88 KG/M2 | WEIGHT: 225 LBS | RESPIRATION RATE: 18 BRPM | SYSTOLIC BLOOD PRESSURE: 127 MMHG

## 2025-02-03 DIAGNOSIS — R10.84 GENERALIZED ABDOMINAL PAIN: Chronic | ICD-10-CM

## 2025-02-03 DIAGNOSIS — Z79.899 ENCOUNTER FOR LONG-TERM (CURRENT) USE OF MEDICATIONS: ICD-10-CM

## 2025-02-03 DIAGNOSIS — M47.812 CERVICAL SPONDYLOSIS WITHOUT MYELOPATHY: Primary | Chronic | ICD-10-CM

## 2025-02-03 PROCEDURE — 99999 PR PBB SHADOW E&M-EST. PATIENT-LVL IV: CPT | Mod: PBBFAC,,, | Performed by: PHYSICIAN ASSISTANT

## 2025-02-03 PROCEDURE — 99214 OFFICE O/P EST MOD 30 MIN: CPT | Mod: PBBFAC | Performed by: PHYSICIAN ASSISTANT

## 2025-02-03 PROCEDURE — 99999PBSHW POCT URINE DRUG SCREEN PRESUMP: Mod: PBBFAC,,,

## 2025-02-03 PROCEDURE — 99214 OFFICE O/P EST MOD 30 MIN: CPT | Mod: S$PBB,,, | Performed by: PHYSICIAN ASSISTANT

## 2025-02-03 PROCEDURE — 80305 DRUG TEST PRSMV DIR OPT OBS: CPT | Mod: PBBFAC | Performed by: PHYSICIAN ASSISTANT

## 2025-02-03 RX ORDER — HYDROCODONE BITARTRATE AND ACETAMINOPHEN 10; 325 MG/1; MG/1
1 TABLET ORAL EVERY 6 HOURS PRN
Qty: 120 TABLET | Refills: 0 | Status: SHIPPED | OUTPATIENT
Start: 2025-04-04 | End: 2025-05-04

## 2025-02-03 RX ORDER — HYDROCODONE BITARTRATE AND ACETAMINOPHEN 10; 325 MG/1; MG/1
1 TABLET ORAL EVERY 6 HOURS PRN
Qty: 120 TABLET | Refills: 0 | Status: SHIPPED | OUTPATIENT
Start: 2025-02-05 | End: 2025-03-07

## 2025-02-03 RX ORDER — HYDROCODONE BITARTRATE AND ACETAMINOPHEN 10; 325 MG/1; MG/1
1 TABLET ORAL EVERY 6 HOURS PRN
Qty: 120 TABLET | Refills: 0 | Status: SHIPPED | OUTPATIENT
Start: 2025-03-07 | End: 2025-04-06

## 2025-02-03 RX ORDER — HYDROCODONE BITARTRATE AND ACETAMINOPHEN 10; 325 MG/1; MG/1
1 TABLET ORAL EVERY 6 HOURS PRN
Qty: 120 TABLET | Refills: 0 | Status: SHIPPED | OUTPATIENT
Start: 2025-04-04 | End: 2025-02-03

## 2025-02-14 NOTE — PATIENT INSTRUCTIONS
Counseling and Referral of Other Preventative  (Italic type indicates deductible and co-insurance are waived)    Patient Name: Simon Voss  Today's Date: 2/19/2025    Health Maintenance       Date Due Completion Date    Hepatitis C Screening Never done ---    Colorectal Cancer Screening Never done ---    Shingles Vaccine (1 of 2) Never done ---    RSV Vaccine (Age 60+ and Pregnant patients) (1 - Risk 60-74 years 1-dose series) Never done ---    Abdominal Aortic Aneurysm Screening Never done ---    COVID-19 Vaccine (1 - 2024-25 season) Never done ---    PROSTATE-SPECIFIC ANTIGEN 06/11/2025 6/11/2024    Override on 8/7/2020: Done    Annual UACr 06/11/2025 6/11/2024    Hemoglobin A1c (Prediabetes) 12/11/2025 12/11/2024    Lipid Panel 12/11/2025 12/11/2024    Override on 8/7/2020: Done (chol: 151 tri: 90 hdl: 48 ldl: 85)    TETANUS VACCINE 11/10/2030 11/10/2020        No orders of the defined types were placed in this encounter.      Counseling and Referral of Other Preventative  (Italic type indicates deductible and co-insurance are waived)    Patient Name: Simon Voss  Today's Date: 2/19/2025    Health Maintenance       Date Due Completion Date    Hepatitis C Screening Never done ---    Colorectal Cancer Screening Never done ---    Shingles Vaccine (1 of 2) Never done ---    RSV Vaccine (Age 60+ and Pregnant patients) (1 - Risk 60-74 years 1-dose series) Never done ---    Abdominal Aortic Aneurysm Screening Never done ---    COVID-19 Vaccine (1 - 2024-25 season) Never done ---    PROSTATE-SPECIFIC ANTIGEN 06/11/2025 6/11/2024    Override on 8/7/2020: Done    Annual UACr 06/11/2025 6/11/2024    Hemoglobin A1c (Prediabetes) 12/11/2025 12/11/2024    Lipid Panel 12/11/2025 12/11/2024    Override on 8/7/2020: Done (chol: 151 tri: 90 hdl: 48 ldl: 85)    TETANUS VACCINE 11/10/2030 11/10/2020        No orders of the defined types were placed in this encounter.      The following information is provided to all  patients.  This information is to help you find resources for any of the problems found today that may be affecting your health:                  Living healthy guide: ms.gov    Understanding Diabetes: www.diabetes.org      Eating healthy: www.cdc.gov/healthyweight      CDC home safety checklist: www.cdc.gov/steadi/patient.html      Agency on Aging: ms.gov    Alcoholics anonymous (AA): www.aa.org      Physical Activity: www.[place]ust[place].nih.gov/kq7ihqp      Tobacco use: ms.gov

## 2025-02-14 NOTE — PROGRESS NOTES
Simon Voss presented for a follow-up Medicare AWV today. The following components were reviewed and updated:    Medical history  Family History  Social history  Allergies and Current Medications  Health Risk Assessment  Health Maintenance  Care Team    **See Completed Assessments for Annual Wellness visit with in the encounter summary    The following assessments were completed:  Depression Screening  Cognitive function Screening  Timed Get Up Test  Whisper Test      Opioid documentation:      Patient does have a current opioid prescription.      Patient accepted further discussion regarding opioid medication use.      Patient is currently taking hydrocodone narcotic for right lower abdominal pain from previous appendectomy surgery in the 90's and scar tissue/adhesions he reports..        Pain level today is 5/10.       In addition to narcotic pain medications, patient is also using (no other oral, topical, or alternative treatments) for pain control.       Patient is followed by a specialist currently for their pain and will not be referred today.       Patient's opioid risk potential based on ORT-OUD tool:       Juan Carlos each box that applies   No   Yes     Family history of substance abuse   Alcohol [x] []   Illegal drugs [x] []   Rx drugs [x] []     Personal history of substance abuse   Alcohol [x] []   Illegal drugs [x] []   Rx drugs [x] []     Age between 16-45 years   [x]   []     Patient with ADD, OCD, Bipolar disorder, schizoprenia   [x]   []     Patient with depression   [x]   []                         Scoring total                                                                 Non-opioid treatment options have been discussed today and added to the patient's after visit summary.          Vitals:    02/19/25 0948   BP: 112/74   BP Location: Left arm   Patient Position: Sitting   Pulse: 60   Resp: 20   Temp: 97.7 °F (36.5 °C)   TempSrc: Oral   SpO2: 99%   Weight: 105.7 kg (233 lb 0.6 oz)   Height: 6'  "2" (1.88 m)     Body mass index is 29.92 kg/m².       Physical Exam  Constitutional:       Appearance: Normal appearance.   HENT:      Head: Normocephalic.   Eyes:      Extraocular Movements: Extraocular movements intact.      Conjunctiva/sclera: Conjunctivae normal.   Cardiovascular:      Rate and Rhythm: Normal rate and regular rhythm.      Pulses: Normal pulses.      Heart sounds: Normal heart sounds.   Pulmonary:      Effort: Pulmonary effort is normal.      Breath sounds: Normal breath sounds.   Skin:     General: Skin is warm and dry.   Neurological:      General: No focal deficit present.      Mental Status: He is alert. Mental status is at baseline.   Psychiatric:         Mood and Affect: Mood normal.         Behavior: Behavior normal.           1. Encounter for subsequent annual wellness visit (AWV) in Medicare patient  Assessment & Plan:  Attend regularly scheduled office visits.  Monitor/keep log of BP outside of clinic.   Schedule colonoscopy as needed.  Take medications as prescribed.   Avoid adding table salt to foods.   Recommend regular physical activity 30 min most days of the week.        2. Hypertension, unspecified type  Assessment & Plan:  Blood pressure is controlled. Current medical regimen is effective;   Will adjust according to results and monitor.     1) Check your blood pressure at home. Please notify me at the clinic if the systolic (top number) is consistently over 140 or under 110 or if the diastolic (bottom number) is consistently over 90 or under 60.  2) Regular aerobic physical activity ( e.g. brisk walking) at least 30 min 5 out of 7 days of the week  3) Low sodium diet.  4) Take your meds as directed  5) To the ER for any signs of chest pain/tightness/palpitations/shortness of breath/difficulty speaking/numbness or tingling in face or extremities  6) Have yearly eye exams        3. Hyperlipidemia, unspecified hyperlipidemia type  Assessment & Plan:  Hyperlipidemia is controlled. " Current medical regimen is effective;   Will adjust according to results and monitor.       4. Prediabetes  Assessment & Plan:  Prediabetes  is controlled. Current medical regimen is effective;   Will adjust according to results and monitor.       5. Chronic pain syndrome  Assessment & Plan:  Followed by Pain Management. Symptoms under control.      6. Depression, unspecified depression type  Assessment & Plan:  Depression is controlled. Current medical regimen is effective;   Will adjust according to results and monitor.       7. BMI 28.0-28.9,adult  Assessment & Plan:  Pt education given on healthy diet.      8. Chronic kidney disease, stage 3a  Assessment & Plan:  CkD  is controlled. Current medical regimen is effective;   Will adjust according to results and monitor.              Provided Simon with a 5-10 year written screening schedule and personal prevention plan. Recommendations were developed using the USPSTF age appropriate recommendations. Education, counseling, and referrals were provided as needed.  After Visit Summary printed and given to patient which includes a list of additional screenings\tests needed.    Follow up for yearly annual wellness visit  [place]. Pt reports he was to repeat in 10 years.  Pt declined AAA Screening  I offered to discuss advanced care planning, including how to pick a person who would make decisions for you if you were unable to make them for yourself, called a health care power of , and what kind of decisions you might make such as use of life sustaining treatments such as ventilators and tube feeding when faced with a life limiting illness recorded on a living will that they will need to know. (How you want to be cared for as you near the end of your natural life)     X Patient is interested in learning more about how to make advanced directives.  I provided them paperwork and offered to  discuss this with them.

## 2025-02-19 ENCOUNTER — OFFICE VISIT (OUTPATIENT)
Dept: FAMILY MEDICINE | Facility: CLINIC | Age: 70
End: 2025-02-19
Payer: MEDICARE

## 2025-02-19 VITALS
OXYGEN SATURATION: 99 % | RESPIRATION RATE: 20 BRPM | HEIGHT: 74 IN | SYSTOLIC BLOOD PRESSURE: 112 MMHG | HEART RATE: 60 BPM | TEMPERATURE: 98 F | BODY MASS INDEX: 29.91 KG/M2 | DIASTOLIC BLOOD PRESSURE: 74 MMHG | WEIGHT: 233.06 LBS

## 2025-02-19 DIAGNOSIS — G89.4 CHRONIC PAIN SYNDROME: ICD-10-CM

## 2025-02-19 DIAGNOSIS — N18.31 CHRONIC KIDNEY DISEASE, STAGE 3A: ICD-10-CM

## 2025-02-19 DIAGNOSIS — F32.A DEPRESSION, UNSPECIFIED DEPRESSION TYPE: ICD-10-CM

## 2025-02-19 DIAGNOSIS — I10 HYPERTENSION, UNSPECIFIED TYPE: ICD-10-CM

## 2025-02-19 DIAGNOSIS — E78.5 HYPERLIPIDEMIA, UNSPECIFIED HYPERLIPIDEMIA TYPE: ICD-10-CM

## 2025-02-19 DIAGNOSIS — R73.03 PREDIABETES: ICD-10-CM

## 2025-02-19 DIAGNOSIS — Z00.00 ENCOUNTER FOR SUBSEQUENT ANNUAL WELLNESS VISIT (AWV) IN MEDICARE PATIENT: Primary | ICD-10-CM

## 2025-02-19 NOTE — ASSESSMENT & PLAN NOTE
Prediabetes  is controlled. Current medical regimen is effective;   Will adjust according to results and monitor.

## 2025-04-22 NOTE — PROGRESS NOTES
Subjective:         Patient ID: Simon Voss is a 69 y.o. male.    Chief Complaint: Abdominal Pain (Patient is having abdominal pain over scar tissue.)      Pain  This is a chronic problem. The current episode started more than 1 year ago. The problem occurs daily. The problem has been unchanged. Associated symptoms include abdominal pain, arthralgias and neck pain. Pertinent negatives include no chest pain, chills, diaphoresis, fever, headaches, swollen glands, urinary symptoms or vertigo.     Review of Systems   Constitutional:  Negative for activity change, appetite change, chills, diaphoresis and fever.   HENT:  Negative for drooling, ear discharge, ear pain, facial swelling, mouth sores, nosebleeds, trouble swallowing, voice change and goiter.    Eyes:  Negative for photophobia, pain, discharge, redness and visual disturbance.   Respiratory:  Negative for apnea, choking, chest tightness, shortness of breath, wheezing and stridor.    Cardiovascular:  Negative for chest pain, palpitations and leg swelling.   Gastrointestinal:  Positive for abdominal pain. Negative for abdominal distention, diarrhea, rectal pain and fecal incontinence.   Endocrine: Negative for cold intolerance, heat intolerance, polydipsia, polyphagia and polyuria.   Genitourinary:  Negative for bladder incontinence, dysuria, flank pain and frequency.   Musculoskeletal:  Positive for arthralgias, back pain, leg pain, neck pain and neck stiffness.   Integumentary:  Negative for color change and pallor.   Neurological:  Negative for dizziness, vertigo, seizures, syncope, facial asymmetry, speech difficulty, light-headedness, headaches, coordination difficulties and memory loss.   Hematological:  Negative for adenopathy. Does not bruise/bleed easily.   Psychiatric/Behavioral:  Negative for agitation, behavioral problems, confusion, decreased concentration, hallucinations, self-injury and suicidal ideas. The patient is not nervous/anxious and  is not hyperactive.            Past Medical History:   Diagnosis Date    Anxiety     Cervical spondylosis without myelopathy     Chronic pain syndrome     Depression     Generalized abdominal pain     High cholesterol     Hypertension      Past Surgical History:   Procedure Laterality Date    APPENDECTOMY       Social History     Socioeconomic History    Marital status:    Tobacco Use    Smoking status: Former     Current packs/day: 0.00     Average packs/day: 0.5 packs/day for 14.0 years (7.0 ttl pk-yrs)     Types: Cigarettes     Start date:      Quit date:      Years since quittin.3     Passive exposure: Past    Smokeless tobacco: Former     Types: Snuff, Chew     Quit date: 2023   Substance and Sexual Activity    Alcohol use: Not Currently    Drug use: Yes     Types: Hydrocodone    Sexual activity: Not Currently     Social Drivers of Health     Financial Resource Strain: Low Risk  (2025)    Overall Financial Resource Strain (CARDIA)     Difficulty of Paying Living Expenses: Not hard at all   Food Insecurity: No Food Insecurity (2025)    Hunger Vital Sign     Worried About Running Out of Food in the Last Year: Never true     Ran Out of Food in the Last Year: Never true   Transportation Needs: No Transportation Needs (2025)    PRAPARE - Transportation     Lack of Transportation (Medical): No     Lack of Transportation (Non-Medical): No   Physical Activity: Insufficiently Active (2025)    Exercise Vital Sign     Days of Exercise per Week: 4 days     Minutes of Exercise per Session: 30 min   Stress: No Stress Concern Present (2025)    [employer] - Occupational Stress Questionnaire     Feeling of Stress : Not at all   Housing Stability: Low Risk  (2025)    Housing Stability Vital Sign     Unable to Pay for Housing in the Last Year: No     Number of Times Moved in the Last Year: 0     Homeless in the Last Year: No     Family History  "  Problem Relation Name Age of Onset    Cancer Mother      Diabetes Maternal Grandmother       Review of patient's allergies indicates:  No Known Allergies     Objective:  Vitals:    04/29/25 1006 04/29/25 1007   BP: 112/77    Pulse: 65    Resp: 18    Weight: 98.9 kg (218 lb)    Height: 6' 2" (1.88 m)    PainSc:   7   7   PainLoc: Abdomen                  Physical Exam  Vitals and nursing note reviewed. Exam conducted with a chaperone present.   Constitutional:       General: He is awake. He is not in acute distress.     Appearance: Normal appearance. He is not toxic-appearing or diaphoretic.   HENT:      Head: Normocephalic and atraumatic.      Nose: Nose normal.      Mouth/Throat:      Mouth: Mucous membranes are moist.      Pharynx: Oropharynx is clear.   Eyes:      Conjunctiva/sclera: Conjunctivae normal.      Pupils: Pupils are equal, round, and reactive to light.   Cardiovascular:      Rate and Rhythm: Normal rate.   Pulmonary:      Effort: Pulmonary effort is normal. No respiratory distress.   Abdominal:      Palpations: Abdomen is soft.      Tenderness: There is abdominal tenderness.   Musculoskeletal:      Cervical back: Normal range of motion and neck supple.      Thoracic back: Tenderness present.      Lumbar back: Tenderness present. Decreased range of motion.   Skin:     General: Skin is warm and dry.   Neurological:      General: No focal deficit present.      Mental Status: He is alert and oriented to person, place, and time. Mental status is at baseline.      Cranial Nerves: No cranial nerve deficit (II-XII).   Psychiatric:         Mood and Affect: Mood normal.         Behavior: Behavior normal. Behavior is cooperative.         Thought Content: Thought content normal.           No image results found.       Office Visit on 02/03/2025   Component Date Value Ref Range Status    POC Amphetamines 02/03/2025 Negative  Negative, Inconclusive Final    POC Barbiturates 02/03/2025 Negative  Negative, " Inconclusive Final    POC Benzodiazepines 02/03/2025 Negative  Negative, Inconclusive Final    POC Cocaine 02/03/2025 Negative  Negative, Inconclusive Final    POC THC 02/03/2025 Negative  Negative, Inconclusive Final    POC Methadone 02/03/2025 Negative  Negative, Inconclusive Final    POC Methamphetamine 02/03/2025 Negative  Negative, Inconclusive Final    POC Opiates 02/03/2025 Presumptive Positive (A)  Negative, Inconclusive Final    POC Oxycodone 02/03/2025 Negative  Negative, Inconclusive Final    POC Phencyclidine 02/03/2025 Negative  Negative, Inconclusive Final    POC Methylenedioxymethamphetamine * 02/03/2025 Negative  Negative, Inconclusive Final    POC Tricyclic Antidepressants 02/03/2025 Negative  Negative, Inconclusive Final    POC Buprenorphine 02/03/2025 Negative   Final     Acceptable 02/03/2025 Yes   Final    POC Temperature (Urine) 02/03/2025 92   Final   Office Visit on 12/11/2024   Component Date Value Ref Range Status    Sodium 12/11/2024 136  136 - 145 mmol/L Final    Potassium 12/11/2024 4.3  3.5 - 5.1 mmol/L Final    Chloride 12/11/2024 101  98 - 107 mmol/L Final    CO2 12/11/2024 28  23 - 31 mmol/L Final    Anion Gap 12/11/2024 11  7 - 16 mmol/L Final    Glucose 12/11/2024 105  82 - 115 mg/dL Final    BUN 12/11/2024 13  8 - 26 mg/dL Final    Creatinine 12/11/2024 1.13  0.72 - 1.25 mg/dL Final    BUN/Creatinine Ratio 12/11/2024 12  6 - 20 Final    Calcium 12/11/2024 8.8  8.8 - 10.0 mg/dL Final    Total Protein 12/11/2024 6.5  5.8 - 7.6 g/dL Final    Albumin 12/11/2024 3.8  3.4 - 4.8 g/dL Final    Globulin 12/11/2024 2.7  2.0 - 4.0 g/dL Final    A/G Ratio 12/11/2024 1.4   Final    Bilirubin, Total 12/11/2024 0.4  <=1.5 mg/dL Final    Alk Phos 12/11/2024 51  40 - 150 U/L Final    ALT 12/11/2024 12  <=55 U/L Final    AST 12/11/2024 21  5 - 34 U/L Final    eGFR 12/11/2024 70  >=60 mL/min/1.73m2 Final    Triglycerides 12/11/2024 95  34 - 140 mg/dL Final    Cholesterol 12/11/2024  146  <=200 mg/dL Final    HDL Cholesterol 12/11/2024 42  35 - 60 mg/dL Final    Cholesterol/HDL Ratio (Risk Factor) 12/11/2024 3.5   Final    Non-HDL 12/11/2024 104  mg/dL Final    LDL Calculated 12/11/2024 85  mg/dL Final    LDL/HDL 12/11/2024 2.0   Final    VLDL 12/11/2024 19  mg/dL Final    Hemoglobin A1C 12/11/2024 5.6  <=7.0 % Final    Estimated Average Glucose 12/11/2024 114  mg/dL Final    WBC 12/11/2024 8.83  4.50 - 11.00 K/uL Final    RBC 12/11/2024 4.86  4.60 - 6.20 M/uL Final    Hemoglobin 12/11/2024 14.3  13.5 - 18.0 g/dL Final    Hematocrit 12/11/2024 46.3  40.0 - 54.0 % Final    MCV 12/11/2024 95.3  80.0 - 96.0 fL Final    MCH 12/11/2024 29.4  27.0 - 31.0 pg Final    MCHC 12/11/2024 30.9 (L)  32.0 - 36.0 g/dL Final    RDW 12/11/2024 12.4  11.5 - 14.5 % Final    Platelet Count 12/11/2024 244  150 - 400 K/uL Final    MPV 12/11/2024 11.0  9.4 - 12.4 fL Final    Neutrophils % 12/11/2024 67.1 (H)  53.0 - 65.0 % Final    Lymphocytes % 12/11/2024 19.4 (L)  27.0 - 41.0 % Final    Monocytes % 12/11/2024 8.6 (H)  2.0 - 6.0 % Final    Eosinophils % 12/11/2024 3.3  1.0 - 4.0 % Final    Basophils % 12/11/2024 1.1 (H)  0.0 - 1.0 % Final    Immature Granulocytes % 12/11/2024 0.5 (H)  0.0 - 0.4 % Final    nRBC, Auto 12/11/2024 0.0  <=0.0 % Final    Neutrophils, Abs 12/11/2024 5.93  1.80 - 7.70 K/uL Final    Lymphocytes, Absolute 12/11/2024 1.71  1.00 - 4.80 K/uL Final    Monocytes, Absolute 12/11/2024 0.76  0.00 - 0.80 K/uL Final    Eosinophils, Absolute 12/11/2024 0.29  0.00 - 0.50 K/uL Final    Basophils, Absolute 12/11/2024 0.10  0.00 - 0.20 K/uL Final    Immature Granulocytes, Absolute 12/11/2024 0.04  0.00 - 0.04 K/uL Final    nRBC, Absolute 12/11/2024 0.00  <=0.00 x10e3/uL Final    Diff Type 12/11/2024 Auto   Final   Office Visit on 11/06/2024   Component Date Value Ref Range Status    POC Amphetamines 11/06/2024 Negative  Negative, Inconclusive Final    POC Barbiturates 11/06/2024 Negative  Negative,  Inconclusive Final    POC Benzodiazepines 11/06/2024 Negative  Negative, Inconclusive Final    POC Cocaine 11/06/2024 Negative  Negative, Inconclusive Final    POC THC 11/06/2024 Negative  Negative, Inconclusive Final    POC Methadone 11/06/2024 Negative  Negative, Inconclusive Final    POC Methamphetamine 11/06/2024 Negative  Negative, Inconclusive Final    POC Opiates 11/06/2024 Presumptive Positive (A)  Negative, Inconclusive Final    POC Oxycodone 11/06/2024 Negative  Negative, Inconclusive Final    POC Phencyclidine 11/06/2024 Negative  Negative, Inconclusive Final    POC Methylenedioxymethamphetamine * 11/06/2024 Negative  Negative, Inconclusive Final    POC Tricyclic Antidepressants 11/06/2024 Negative  Negative, Inconclusive Final    POC Buprenorphine 11/06/2024 Negative   Final     Acceptable 11/06/2024 Yes   Final    POC Temperature (Urine) 11/06/2024 94   Final         Orders Placed This Encounter   Procedures    POCT Urine Drug Screen Presump     Interpretive Information:     Negative:  No drug detected at the cut off level.   Positive:  This result represents presumptive positive for the   tested drug, other substances may yield a positive response other   than the analyte of interest. This result should be utilized for   diagnostic purpose only. Confirmation testing will be performed upon physician request only.          Requested Prescriptions     Signed Prescriptions Disp Refills    HYDROcodone-acetaminophen (NORCO)  mg per tablet 120 tablet 0     Sig: Take 1 tablet by mouth every 6 (six) hours as needed for Pain (pain).    HYDROcodone-acetaminophen (NORCO)  mg per tablet 120 tablet 0     Sig: Take 1 tablet by mouth every 6 (six) hours as needed for Pain (pain).    HYDROcodone-acetaminophen (NORCO)  mg per tablet 120 tablet 0     Sig: Take 1 tablet by mouth every 6 (six) hours as needed for Pain (pain).       Assessment:     1. Generalized abdominal pain    2.  Cervical spondylosis without myelopathy    3. Encounter for long-term (current) use of medications                 A's of Opioid Risk Assessment  Activity:Patient can perform ADL.   Analgesia:Patients pain is partially controlled by current medication. Patient has tried OTC medications such as Tylenol and Ibuprofen with out relief.   Adverse Effects: Patient denies constipation or sedation.  Aberrant Behavior:  reviewed with no aberrant drug seeking/taking behavior.  Overdose reversal drug naloxone discussed    Drug screen reviewed      Plan:    Use July 5, 2025 refill date next visit        Narcan March 2023     Pharmacy closed on weekends      Chronic abdominal pain After mesh placement after surgery      He states current medication does help with his chronic discomfort     He would like to continue with current medication    Continue current medication    Continue home exercise program as directed    Follow-up 3 months    Dr. Kong, May 2025    Bring original prescription medication bottles/container/box with labels to each visit

## 2025-04-29 ENCOUNTER — OFFICE VISIT (OUTPATIENT)
Dept: PAIN MEDICINE | Facility: CLINIC | Age: 70
End: 2025-04-29
Payer: MEDICARE

## 2025-04-29 VITALS
RESPIRATION RATE: 18 BRPM | HEART RATE: 65 BPM | WEIGHT: 218 LBS | SYSTOLIC BLOOD PRESSURE: 112 MMHG | DIASTOLIC BLOOD PRESSURE: 77 MMHG | BODY MASS INDEX: 27.98 KG/M2 | HEIGHT: 74 IN

## 2025-04-29 DIAGNOSIS — M47.812 CERVICAL SPONDYLOSIS WITHOUT MYELOPATHY: Chronic | ICD-10-CM

## 2025-04-29 DIAGNOSIS — R10.84 GENERALIZED ABDOMINAL PAIN: Primary | Chronic | ICD-10-CM

## 2025-04-29 DIAGNOSIS — Z79.899 ENCOUNTER FOR LONG-TERM (CURRENT) USE OF MEDICATIONS: ICD-10-CM

## 2025-04-29 PROCEDURE — 99999PBSHW POCT URINE DRUG SCREEN PRESUMP: Mod: PBBFAC,,,

## 2025-04-29 PROCEDURE — 99214 OFFICE O/P EST MOD 30 MIN: CPT | Mod: PBBFAC | Performed by: PHYSICIAN ASSISTANT

## 2025-04-29 PROCEDURE — 80305 DRUG TEST PRSMV DIR OPT OBS: CPT | Mod: PBBFAC | Performed by: PHYSICIAN ASSISTANT

## 2025-04-29 PROCEDURE — 99214 OFFICE O/P EST MOD 30 MIN: CPT | Mod: S$PBB,,, | Performed by: PHYSICIAN ASSISTANT

## 2025-04-29 PROCEDURE — 99999 PR PBB SHADOW E&M-EST. PATIENT-LVL IV: CPT | Mod: PBBFAC,,, | Performed by: PHYSICIAN ASSISTANT

## 2025-04-29 RX ORDER — HYDROCODONE BITARTRATE AND ACETAMINOPHEN 10; 325 MG/1; MG/1
1 TABLET ORAL EVERY 6 HOURS PRN
Qty: 120 TABLET | Refills: 0 | Status: SHIPPED | OUTPATIENT
Start: 2025-07-03 | End: 2025-08-02

## 2025-04-29 RX ORDER — HYDROCODONE BITARTRATE AND ACETAMINOPHEN 10; 325 MG/1; MG/1
1 TABLET ORAL EVERY 6 HOURS PRN
Qty: 120 TABLET | Refills: 0 | Status: SHIPPED | OUTPATIENT
Start: 2025-06-05 | End: 2025-07-05

## 2025-04-29 RX ORDER — HYDROCODONE BITARTRATE AND ACETAMINOPHEN 10; 325 MG/1; MG/1
1 TABLET ORAL EVERY 6 HOURS PRN
Qty: 120 TABLET | Refills: 0 | Status: SHIPPED | OUTPATIENT
Start: 2025-05-06 | End: 2025-06-05

## 2025-06-11 ENCOUNTER — OFFICE VISIT (OUTPATIENT)
Dept: FAMILY MEDICINE | Facility: CLINIC | Age: 70
End: 2025-06-11
Payer: MEDICARE

## 2025-06-11 VITALS
HEIGHT: 74 IN | WEIGHT: 221 LBS | HEART RATE: 63 BPM | SYSTOLIC BLOOD PRESSURE: 103 MMHG | RESPIRATION RATE: 19 BRPM | TEMPERATURE: 98 F | OXYGEN SATURATION: 100 % | BODY MASS INDEX: 28.36 KG/M2 | DIASTOLIC BLOOD PRESSURE: 69 MMHG

## 2025-06-11 DIAGNOSIS — G47.00 INSOMNIA, UNSPECIFIED TYPE: ICD-10-CM

## 2025-06-11 DIAGNOSIS — F32.A DEPRESSION, UNSPECIFIED DEPRESSION TYPE: ICD-10-CM

## 2025-06-11 DIAGNOSIS — N18.31 CHRONIC KIDNEY DISEASE, STAGE 3A: ICD-10-CM

## 2025-06-11 DIAGNOSIS — Z12.5 ENCOUNTER FOR SCREENING FOR MALIGNANT NEOPLASM OF PROSTATE: ICD-10-CM

## 2025-06-11 DIAGNOSIS — E78.5 HYPERLIPIDEMIA, UNSPECIFIED HYPERLIPIDEMIA TYPE: ICD-10-CM

## 2025-06-11 DIAGNOSIS — M47.812 CERVICAL SPONDYLOSIS WITHOUT MYELOPATHY: Chronic | ICD-10-CM

## 2025-06-11 DIAGNOSIS — I10 HYPERTENSION, UNSPECIFIED TYPE: Primary | ICD-10-CM

## 2025-06-11 LAB
ALBUMIN SERPL BCP-MCNC: 4.1 G/DL (ref 3.4–4.8)
ALBUMIN/GLOB SERPL: 1.5 {RATIO}
ALP SERPL-CCNC: 43 U/L (ref 40–150)
ALT SERPL W P-5'-P-CCNC: 13 U/L
ANION GAP SERPL CALCULATED.3IONS-SCNC: 10 MMOL/L (ref 7–16)
AST SERPL W P-5'-P-CCNC: 22 U/L (ref 11–45)
BASOPHILS # BLD AUTO: 0.09 K/UL (ref 0–0.2)
BASOPHILS NFR BLD AUTO: 1.4 % (ref 0–1)
BILIRUB SERPL-MCNC: 0.4 MG/DL
BUN SERPL-MCNC: 19 MG/DL (ref 8–26)
BUN/CREAT SERPL: 18 (ref 6–20)
CALCIUM SERPL-MCNC: 9.1 MG/DL (ref 8.8–10)
CHLORIDE SERPL-SCNC: 103 MMOL/L (ref 98–107)
CHOLEST SERPL-MCNC: 148 MG/DL
CHOLEST/HDLC SERPL: 2.9 {RATIO}
CO2 SERPL-SCNC: 33 MMOL/L (ref 23–31)
CREAT SERPL-MCNC: 1.08 MG/DL (ref 0.72–1.25)
CREAT UR-MCNC: 66 MG/DL (ref 23–375)
DIFFERENTIAL METHOD BLD: ABNORMAL
EGFR (NO RACE VARIABLE) (RUSH/TITUS): 74 ML/MIN/1.73M2
EOSINOPHIL # BLD AUTO: 0.34 K/UL (ref 0–0.5)
EOSINOPHIL NFR BLD AUTO: 5.1 % (ref 1–4)
ERYTHROCYTE [DISTWIDTH] IN BLOOD BY AUTOMATED COUNT: 12.7 % (ref 11.5–14.5)
GLOBULIN SER-MCNC: 2.8 G/DL (ref 2–4)
GLUCOSE SERPL-MCNC: 95 MG/DL (ref 82–115)
HCT VFR BLD AUTO: 46.6 % (ref 40–54)
HDLC SERPL-MCNC: 51 MG/DL (ref 35–60)
HGB BLD-MCNC: 14.5 G/DL (ref 13.5–18)
IMM GRANULOCYTES # BLD AUTO: 0.03 K/UL (ref 0–0.04)
IMM GRANULOCYTES NFR BLD: 0.5 % (ref 0–0.4)
LDLC SERPL CALC-MCNC: 83 MG/DL
LDLC/HDLC SERPL: 1.6 {RATIO}
LYMPHOCYTES # BLD AUTO: 1.9 K/UL (ref 1–4.8)
LYMPHOCYTES NFR BLD AUTO: 28.7 % (ref 27–41)
MCH RBC QN AUTO: 29.8 PG (ref 27–31)
MCHC RBC AUTO-ENTMCNC: 31.1 G/DL (ref 32–36)
MCV RBC AUTO: 95.9 FL (ref 80–96)
MICROALBUMIN UR-MCNC: <0.5 MG/DL
MICROALBUMIN/CREAT RATIO PNL UR: NORMAL
MONOCYTES # BLD AUTO: 0.6 K/UL (ref 0–0.8)
MONOCYTES NFR BLD AUTO: 9 % (ref 2–6)
MPC BLD CALC-MCNC: 11.7 FL (ref 9.4–12.4)
NEUTROPHILS # BLD AUTO: 3.67 K/UL (ref 1.8–7.7)
NEUTROPHILS NFR BLD AUTO: 55.3 % (ref 53–65)
NONHDLC SERPL-MCNC: 97 MG/DL
NRBC # BLD AUTO: 0 X10E3/UL
NRBC, AUTO (.00): 0 %
PLATELET # BLD AUTO: 192 K/UL (ref 150–400)
POTASSIUM SERPL-SCNC: 4.4 MMOL/L (ref 3.5–5.1)
PROT SERPL-MCNC: 6.9 G/DL (ref 5.8–7.6)
PSA SERPL-MCNC: 0.35 NG/ML
RBC # BLD AUTO: 4.86 M/UL (ref 4.6–6.2)
SODIUM SERPL-SCNC: 142 MMOL/L (ref 136–145)
TRIGL SERPL-MCNC: 68 MG/DL (ref 34–140)
VLDLC SERPL-MCNC: 14 MG/DL
WBC # BLD AUTO: 6.63 K/UL (ref 4.5–11)

## 2025-06-11 PROCEDURE — G0103 PSA SCREENING: HCPCS | Mod: ,,, | Performed by: CLINICAL MEDICAL LABORATORY

## 2025-06-11 PROCEDURE — 80061 LIPID PANEL: CPT | Mod: ,,, | Performed by: CLINICAL MEDICAL LABORATORY

## 2025-06-11 PROCEDURE — 82570 ASSAY OF URINE CREATININE: CPT | Mod: ,,, | Performed by: CLINICAL MEDICAL LABORATORY

## 2025-06-11 PROCEDURE — 85025 COMPLETE CBC W/AUTO DIFF WBC: CPT | Mod: ,,, | Performed by: CLINICAL MEDICAL LABORATORY

## 2025-06-11 PROCEDURE — 82043 UR ALBUMIN QUANTITATIVE: CPT | Mod: ,,, | Performed by: CLINICAL MEDICAL LABORATORY

## 2025-06-11 PROCEDURE — 99214 OFFICE O/P EST MOD 30 MIN: CPT | Mod: ,,, | Performed by: NURSE PRACTITIONER

## 2025-06-11 PROCEDURE — 80053 COMPREHEN METABOLIC PANEL: CPT | Mod: ,,, | Performed by: CLINICAL MEDICAL LABORATORY

## 2025-06-11 RX ORDER — ESCITALOPRAM OXALATE 20 MG/1
20 TABLET ORAL DAILY
Qty: 90 TABLET | Refills: 1 | Status: SHIPPED | OUTPATIENT
Start: 2025-06-11

## 2025-06-11 RX ORDER — CYCLOBENZAPRINE HCL 10 MG
10 TABLET ORAL 3 TIMES DAILY
Qty: 270 TABLET | Refills: 1 | Status: SHIPPED | OUTPATIENT
Start: 2025-06-11

## 2025-06-11 RX ORDER — LOSARTAN POTASSIUM AND HYDROCHLOROTHIAZIDE 12.5; 5 MG/1; MG/1
1 TABLET ORAL DAILY
Qty: 90 TABLET | Refills: 1 | Status: SHIPPED | OUTPATIENT
Start: 2025-06-11

## 2025-06-11 RX ORDER — TRAZODONE HYDROCHLORIDE 150 MG/1
150 TABLET ORAL NIGHTLY
Qty: 90 TABLET | Refills: 1 | Status: SHIPPED | OUTPATIENT
Start: 2025-06-11

## 2025-06-11 RX ORDER — LOVASTATIN 40 MG/1
40 TABLET ORAL NIGHTLY
Qty: 90 TABLET | Refills: 1 | Status: SHIPPED | OUTPATIENT
Start: 2025-06-11

## 2025-06-11 RX ORDER — ATENOLOL 25 MG/1
25 TABLET ORAL DAILY
Qty: 90 TABLET | Refills: 1 | Status: SHIPPED | OUTPATIENT
Start: 2025-06-11

## 2025-06-11 NOTE — PROGRESS NOTES
SOFIA Garcia   [facility]  [address]  [place] [address]     PATIENT NAME: Simon Voss  : 1955  DATE: 25  MRN: [id number]      Billing Provider: SOFIA Garcia  Level of Service:   Patient PCP Information       Provider PCP Type    SOFIA Garcia General            Reason for Visit / Chief Complaint: Hypertension (Pt is here for his 6 month check up. He said that everything is going well.) and Health Maintenance (Hepatitis C Screening Never done/Colorectal Cancer Screening declined/Shingles Vaccine(1 of 2) declined/RSV Vaccine (Age 60+ and Pregnant patients)(1 - Risk 60-74 years 1-dose series) Never done/Abdominal Aortic Aneurysm Screening Never done/COVID-19 Vaccine( season) Never done/PROSTATE-SPECIFIC ANTIGEN due on 2025/Annual UACr due on 2025/)   Health Maintenance Due   Topic Date Due    Hepatitis C Screening  Never done    Colorectal Cancer Screening  Never done    Shingles Vaccine (1 of 2) Never done    RSV Vaccine (Age 60+ and Pregnant patients) (1 - Risk 60-74 years 1-dose series) Never done    Abdominal Aortic Aneurysm Screening  Never done    COVID-19 Vaccine (1 - 2024-25 season) Never done    PROSTATE-SPECIFIC ANTIGEN  2025    Annual UACr  2025          Update PCP  Update Chief Complaint         History of Present Illness / Problem Focused Workflow     History of Present Illness    CHIEF COMPLAINT:  Patient presents today for follow up.    MEDICATIONS:  He takes 7 medications, including Norco prescribed by pain management. All medications are filled at [facility].    MEDICAL HISTORY:  He reports a previous colonoscopy which was normal, timing unspecified. He declines future colonoscopy and AAA screening.       ROS:  General: -fever, -chills, -fatigue, -weight gain, -weight loss  Eyes: -vision changes, -redness, -discharge  ENT: -ear pain, -nasal congestion, -sore throat  Cardiovascular: -chest pain, -palpitations,  -lower extremity edema  Respiratory: -cough, -shortness of breath  Gastrointestinal: -abdominal pain, -nausea, -vomiting, -diarrhea, -constipation, -blood in stool  Genitourinary: -dysuria, -hematuria, -frequency  Musculoskeletal: -joint pain, -muscle pain  Skin: -rash, -lesion  Neurological: -headache, -dizziness, -numbness, -tingling  Psychiatric: -anxiety, -depression, -sleep difficulty          Hemoglobin A1C   Date Value Ref Range Status   12/11/2024 5.6 <=7.0 % Final     Comment:       Normal:               <5.7%  Pre-Diabetic:       5.7% to 6.4%  Diabetic:             >6.4%  Diabetic Goal:     <7%   12/13/2023 5.6 4.5 - 6.6 % Final     Comment:       Normal:               <5.7%  Pre-Diabetic:       5.7% to 6.4%  Diabetic:             >6.4%  Diabetic Goal:     <7%   06/06/2023 5.7 4.5 - 6.6 % Final     Comment:       Normal:               <5.7%  Pre-Diabetic:       5.7% to 6.4%  Diabetic:             >6.4%  Diabetic Goal:     <7%        CMP  Sodium   Date Value Ref Range Status   12/11/2024 136 136 - 145 mmol/L Final     Potassium   Date Value Ref Range Status   12/11/2024 4.3 3.5 - 5.1 mmol/L Final     Chloride   Date Value Ref Range Status   12/11/2024 101 98 - 107 mmol/L Final     CO2   Date Value Ref Range Status   12/11/2024 28 23 - 31 mmol/L Final     Glucose   Date Value Ref Range Status   12/11/2024 105 82 - 115 mg/dL Final   02/09/2021 98 mg/dL Final     BUN   Date Value Ref Range Status   12/11/2024 13 8 - 26 mg/dL Final     Creatinine   Date Value Ref Range Status   12/11/2024 1.13 0.72 - 1.25 mg/dL Final     Calcium   Date Value Ref Range Status   12/11/2024 8.8 8.8 - 10.0 mg/dL Final     Total Protein   Date Value Ref Range Status   12/11/2024 6.5 5.8 - 7.6 g/dL Final     Albumin   Date Value Ref Range Status   12/11/2024 3.8 3.4 - 4.8 g/dL Final     Bilirubin, Total   Date Value Ref Range Status   12/11/2024 0.4 <=1.5 mg/dL Final     Alk Phos   Date Value Ref Range Status   12/11/2024 51 40 - 150  U/L Final     AST   Date Value Ref Range Status   12/11/2024 21 5 - 34 U/L Final     ALT   Date Value Ref Range Status   12/11/2024 12 <=55 U/L Final     Anion Gap   Date Value Ref Range Status   12/11/2024 11 7 - 16 mmol/L Final     eGFR   Date Value Ref Range Status   12/11/2024 70 >=60 mL/min/1.73m2 Final        Lab Results   Component Value Date    WBC 8.83 12/11/2024    RBC 4.86 12/11/2024    HGB 14.3 12/11/2024    HCT 46.3 12/11/2024    MCV 95.3 12/11/2024    MCH 29.4 12/11/2024    MCHC 30.9 (L) 12/11/2024    RDW 12.4 12/11/2024     12/11/2024    MPV 11.0 12/11/2024    LYMPH 19.4 (L) 12/11/2024    LYMPH 1.71 12/11/2024    MONO 8.6 (H) 12/11/2024    EOS 0.29 12/11/2024    BASO 0.10 12/11/2024    EOSINOPHIL 3.3 12/11/2024    BASOPHIL 1.1 (H) 12/11/2024        Lab Results   Component Value Date    CHOL 146 12/11/2024    CHOL 144 06/11/2024    CHOL 172 12/13/2023     Lab Results   Component Value Date    HDL 42 12/11/2024    HDL 50 06/11/2024    HDL 52 12/13/2023     Lab Results   Component Value Date    LDLCALC 85 12/11/2024    LDLCALC 74 06/11/2024    LDLCALC 100 12/13/2023     Lab Results   Component Value Date    TRIG 95 12/11/2024    TRIG 99 06/11/2024    TRIG 99 12/13/2023     Lab Results   Component Value Date    CHOLHDL 3.5 12/11/2024    CHOLHDL 2.9 06/11/2024    CHOLHDL 3.3 12/13/2023        Wt Readings from Last 3 Encounters:   06/11/25 0836 100.2 kg (221 lb)   04/29/25 1006 98.9 kg (218 lb)   02/19/25 0948 105.7 kg (233 lb 0.6 oz)        BP Readings from Last 3 Encounters:   06/11/25 103/69   04/29/25 112/77   02/19/25 112/74        Review of Systems     Review of Systems       Medical / Social / Family History     Past Medical History:   Diagnosis Date    Anxiety     Cervical spondylosis without myelopathy     Chronic pain syndrome     Depression     Generalized abdominal pain     High cholesterol     Hypertension        Past Surgical History:   Procedure Laterality Date    APPENDECTOMY          Social History    reports that he quit smoking about 51 years ago. His smoking use included cigarettes. He started smoking about 65 years ago. He has a 7 pack-year smoking history. He has been exposed to tobacco smoke. He quit smokeless tobacco use about 20 months ago.  His smokeless tobacco use included snuff and chew. He reports that he does not currently use alcohol. He reports current drug use. Drug: Hydrocodone.    Family History  's family history includes Cancer in his mother; Diabetes in his maternal grandmother.    Medications and Allergies     Medications  Outpatient Medications Marked as Taking for the 6/11/25 encounter (Office Visit) with Terri Galeana FNP   Medication Sig Dispense Refill    HYDROcodone-acetaminophen (NORCO)  mg per tablet Take 1 tablet by mouth every 6 (six) hours as needed for Pain (pain). 120 tablet 0    [START ON 7/3/2025] HYDROcodone-acetaminophen (NORCO)  mg per tablet Take 1 tablet by mouth every 6 (six) hours as needed for Pain (pain). 120 tablet 0    [DISCONTINUED] atenoloL (TENORMIN) 25 MG tablet Take 1 tablet (25 mg total) by mouth once daily. 90 tablet 1    [DISCONTINUED] cyclobenzaprine (FLEXERIL) 10 MG tablet Take 1 tablet (10 mg total) by mouth 3 (three) times daily. 270 tablet 1    [DISCONTINUED] EScitalopram oxalate (LEXAPRO) 20 MG tablet Take 1 tablet (20 mg total) by mouth once daily. 90 tablet 1    [DISCONTINUED] losartan-hydrochlorothiazide 50-12.5 mg (HYZAAR) 50-12.5 mg per tablet Take 1 tablet by mouth once daily. 90 tablet 1    [DISCONTINUED] lovastatin (MEVACOR) 40 MG tablet Take 1 tablet (40 mg total) by mouth nightly. 90 tablet 1    [DISCONTINUED] traZODone (DESYREL) 150 MG tablet Take 1 tablet (150 mg total) by mouth every evening. 90 tablet 1       Allergies  Review of patient's allergies indicates:  No Known Allergies    Physical Examination     Vitals:    06/11/25 0836   BP: 103/69   BP Location: Right arm   Patient Position: Sitting  "  Pulse: 63   Resp: 19   Temp: 98.2 °F (36.8 °C)   TempSrc: Oral   SpO2: 100%   Weight: 100.2 kg (221 lb)   Height: 6' 2" (1.88 m)      Physical Exam  Constitutional:       Appearance: Normal appearance.   HENT:      Head: Normocephalic.   Eyes:      Extraocular Movements: Extraocular movements intact.   Cardiovascular:      Rate and Rhythm: Normal rate and regular rhythm.      Pulses: Normal pulses.      Heart sounds: Normal heart sounds.   Pulmonary:      Effort: Pulmonary effort is normal.      Breath sounds: Normal breath sounds.   Skin:     General: Skin is warm and dry.      Capillary Refill: Capillary refill takes less than 2 seconds.   Neurological:      General: No focal deficit present.      Mental Status: He is alert and oriented to person, place, and time.   Psychiatric:         Mood and Affect: Mood normal.         Behavior: Behavior normal.          Assessment and Plan (including Health Maintenance)      Problem List  Smart Sets  Document Outside HM   :    Plan:     There are no Patient Instructions on file for this visit.       Health Maintenance Due   Topic Date Due    Hepatitis C Screening  Never done    Colorectal Cancer Screening  Never done    Shingles Vaccine (1 of 2) Never done    RSV Vaccine (Age 60+ and Pregnant patients) (1 - Risk 60-74 years 1-dose series) Never done    Abdominal Aortic Aneurysm Screening  Never done    COVID-19 Vaccine (1 - 2024-25 season) Never done    PROSTATE-SPECIFIC ANTIGEN  06/11/2025    Annual UACr  06/11/2025       Problem List Items Addressed This Visit       Cervical spondylosis without myelopathy (Chronic)    Relevant Medications    cyclobenzaprine (FLEXERIL) 10 MG tablet    Chronic kidney disease, stage 3a    Relevant Orders    Microalbumin/creatinine urine ratio    Depression    Relevant Medications    EScitalopram oxalate (LEXAPRO) 20 MG tablet    Encounter for screening for malignant neoplasm of prostate    Relevant Orders    PSA, Screening    Hyperlipidemia "    Relevant Medications    lovastatin (MEVACOR) 40 MG tablet    Other Relevant Orders    CBC Auto Differential    Comprehensive Metabolic Panel    Lipid Panel    Hypertension - Primary    Relevant Medications    losartan-hydrochlorothiazide 50-12.5 mg (HYZAAR) 50-12.5 mg per tablet    atenoloL (TENORMIN) 25 MG tablet    Other Relevant Orders    CBC Auto Differential    Comprehensive Metabolic Panel    Lipid Panel    Insomnia    Overview   trazadone effective         Relevant Medications    traZODone (DESYREL) 150 MG tablet     Assessment & Plan    Z79.891 Long term (current) use of opiate analgesic  Z87.19 Personal history of other diseases of the digestive system    LONG-TERM USE OF OPIATE ANALGESIC:  - Continued all current medications, with 90-day supply.    GENERAL HEALTH MAINTENANCE:  - Ordered routine labs including CBC, CMP, PSA test, lipid panel, and annual urinalysis.    FOLLOW-UP:  - Follow up in 6 months.  - Contact the office if any issues arise before the next scheduled appointment.        Hypertension, unspecified type  -     CBC Auto Differential; Future; Expected date: 06/11/2025  -     Comprehensive Metabolic Panel; Future; Expected date: 06/11/2025  -     Lipid Panel; Future; Expected date: 06/11/2025  -     losartan-hydrochlorothiazide 50-12.5 mg (HYZAAR) 50-12.5 mg per tablet; Take 1 tablet by mouth once daily.  Dispense: 90 tablet; Refill: 1  -     atenoloL (TENORMIN) 25 MG tablet; Take 1 tablet (25 mg total) by mouth once daily.  Dispense: 90 tablet; Refill: 1    Hyperlipidemia, unspecified hyperlipidemia type  -     CBC Auto Differential; Future; Expected date: 06/11/2025  -     Comprehensive Metabolic Panel; Future; Expected date: 06/11/2025  -     Lipid Panel; Future; Expected date: 06/11/2025  -     lovastatin (MEVACOR) 40 MG tablet; Take 1 tablet (40 mg total) by mouth nightly.  Dispense: 90 tablet; Refill: 1    Encounter for screening for malignant neoplasm of prostate  -     PSA,  Screening; Future; Expected date: 06/11/2025    Insomnia, unspecified type  Comments:  trazadone effective  Orders:  -     traZODone (DESYREL) 150 MG tablet; Take 1 tablet (150 mg total) by mouth every evening.  Dispense: 90 tablet; Refill: 1    Depression, unspecified depression type  -     EScitalopram oxalate (LEXAPRO) 20 MG tablet; Take 1 tablet (20 mg total) by mouth once daily.  Dispense: 90 tablet; Refill: 1    Cervical spondylosis without myelopathy  -     cyclobenzaprine (FLEXERIL) 10 MG tablet; Take 1 tablet (10 mg total) by mouth 3 (three) times daily.  Dispense: 270 tablet; Refill: 1    Chronic kidney disease, stage 3a  -     Microalbumin/creatinine urine ratio       Health Maintenance Topics with due status: Not Due       Topic Last Completion Date    TETANUS VACCINE 11/10/2020    Hemoglobin A1c (Prediabetes) 12/11/2024    Lipid Panel 12/11/2024         Future Appointments   Date Time Provider Department Center   [facility] 11:00 [facility]   12/11/2025  8:20 AM [facility] [place]   2/10/2026 11:00 AM AW[facility] NURSE [facility]        Follow up in about 6 months (around 12/11/2025), or if symptoms worsen or fail to improve.    Health Maintenance Due   Topic Date Due    Hepatitis C Screening  Never done    Colorectal Cancer Screening  Never done    Shingles Vaccine (1 of 2) Never done    RSV Vaccine (Age 60+ and Pregnant patients) (1 - Risk 60-74 years 1-dose series) Never done    Abdominal Aortic Aneurysm Screening  Never done    COVID-19 Vaccine (1 - 2024-25 season) Never done    PROSTATE-SPECIFIC ANTIGEN  06/11/2025    Annual UACr  06/11/2025        Signature:  SOFIA Garcia    Date of encounter: 6/11/25  This note was generated with the assistance of ambient listening technology. Verbal consent was obtained by the patient and accompanying visitor(s) for the recording of patient appointment to facilitate this  note. I attest to having reviewed and edited the generated note for accuracy, though some syntax or spelling errors may persist. Please contact the author of this note for any clarification.

## 2025-06-12 ENCOUNTER — RESULTS FOLLOW-UP (OUTPATIENT)
Dept: FAMILY MEDICINE | Facility: CLINIC | Age: 70
End: 2025-06-12
Payer: MEDICARE

## 2025-07-25 DIAGNOSIS — R10.84 GENERALIZED ABDOMINAL PAIN: Chronic | ICD-10-CM

## 2025-07-25 DIAGNOSIS — M47.812 CERVICAL SPONDYLOSIS WITHOUT MYELOPATHY: Chronic | ICD-10-CM

## 2025-07-25 RX ORDER — HYDROCODONE BITARTRATE AND ACETAMINOPHEN 10; 325 MG/1; MG/1
TABLET ORAL
Qty: 120 TABLET | Refills: 0 | OUTPATIENT
Start: 2025-07-25

## 2025-07-28 ENCOUNTER — OFFICE VISIT (OUTPATIENT)
Dept: PAIN MEDICINE | Facility: CLINIC | Age: 70
End: 2025-07-28
Payer: MEDICARE

## 2025-07-28 VITALS
BODY MASS INDEX: 28.49 KG/M2 | SYSTOLIC BLOOD PRESSURE: 131 MMHG | HEIGHT: 74 IN | DIASTOLIC BLOOD PRESSURE: 74 MMHG | HEART RATE: 66 BPM | RESPIRATION RATE: 18 BRPM | WEIGHT: 222 LBS

## 2025-07-28 DIAGNOSIS — G89.4 CHRONIC PAIN SYNDROME: Chronic | ICD-10-CM

## 2025-07-28 DIAGNOSIS — Z79.899 ENCOUNTER FOR LONG-TERM (CURRENT) USE OF MEDICATIONS: Primary | ICD-10-CM

## 2025-07-28 DIAGNOSIS — R10.84 GENERALIZED ABDOMINAL PAIN: Chronic | ICD-10-CM

## 2025-07-28 LAB
CTP QC/QA: YES
POC (AMP) AMPHETAMINE: NEGATIVE
POC (BAR) BARBITURATES: NEGATIVE
POC (BUP) BUPRENORPHINE: NEGATIVE
POC (BZO) BENZODIAZEPINES: NEGATIVE
POC (COC) COCAINE: NEGATIVE
POC (MDMA) METHYLENEDIOXYMETHAMPHETAMINE 3,4: NEGATIVE
POC (MET) METHAMPHETAMINE: NEGATIVE
POC (MOP) OPIATES: ABNORMAL
POC (MTD) METHADONE: NEGATIVE
POC (OXY) OXYCODONE: NEGATIVE
POC (PCP) PHENCYCLIDINE: NEGATIVE
POC (TCA) TRICYCLIC ANTIDEPRESSANTS: NEGATIVE
POC TEMPERATURE (URINE): 90
POC THC: NEGATIVE

## 2025-07-28 PROCEDURE — 99999 PR PBB SHADOW E&M-EST. PATIENT-LVL IV: CPT | Mod: PBBFAC,,, | Performed by: PAIN MEDICINE

## 2025-07-28 PROCEDURE — 80305 DRUG TEST PRSMV DIR OPT OBS: CPT | Mod: PBBFAC | Performed by: PAIN MEDICINE

## 2025-07-28 PROCEDURE — 99214 OFFICE O/P EST MOD 30 MIN: CPT | Mod: S$PBB,,, | Performed by: PAIN MEDICINE

## 2025-07-28 PROCEDURE — 99214 OFFICE O/P EST MOD 30 MIN: CPT | Mod: PBBFAC | Performed by: PAIN MEDICINE

## 2025-07-28 PROCEDURE — 99999PBSHW POCT URINE DRUG SCREEN PRESUMP: Mod: PBBFAC,,,

## 2025-07-28 RX ORDER — HYDROCODONE BITARTRATE AND ACETAMINOPHEN 10; 325 MG/1; MG/1
1 TABLET ORAL EVERY 6 HOURS PRN
Qty: 120 TABLET | Refills: 0 | Status: SHIPPED | OUTPATIENT
Start: 2025-10-24 | End: 2025-11-23

## 2025-07-28 RX ORDER — HYDROCODONE BITARTRATE AND ACETAMINOPHEN 10; 325 MG/1; MG/1
1 TABLET ORAL EVERY 6 HOURS PRN
Qty: 120 TABLET | Refills: 0 | Status: SHIPPED | OUTPATIENT
Start: 2025-08-26 | End: 2025-09-25

## 2025-07-28 RX ORDER — HYDROCODONE BITARTRATE AND ACETAMINOPHEN 10; 325 MG/1; MG/1
1 TABLET ORAL EVERY 6 HOURS PRN
Qty: 120 TABLET | Refills: 0 | Status: SHIPPED | OUTPATIENT
Start: 2025-09-25 | End: 2025-10-25

## 2025-07-28 NOTE — PROGRESS NOTES
"Michelle Kong M.D.  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 Pain is primarily localized to the right lower quadrant, with exacerbations occurring when he strains at work or during daily activities. He describes it as severe. He has been treated with Narco since approximately 2000, which he reports helps with the pain. He is currently taking Narco 4 times daily. He has discussed his condition with surgeons in the past, who apparently indicated that further surgical intervention was not recommended. He expresses uncertainty about his ability to manage pain without medication.    He denies having tried nerve blocks.    MEDICATIONS:  Patient is on Narco (Hydrocodone/Acetaminophen) 4 times daily since 2000, which helps with his pain.    SURGICAL HISTORY:  Patient underwent an appendectomy in 1996. Post-operatively, he developed gangrene and visceral scarring.    WORK STATUS:  Patient is currently employed. He experiences increased pain when straining at work. The pain affects his mobility and ability to perform certain activities.          Pain Assessment  Pain Assessment: 0-10  Pain Score:   7  Pain Location: Abdomen  Pain Descriptors: Sharp  Pain Frequency: Constant/continuous  Pain Onset: Gradual  Clinical Progression: Not changed  Aggravating Factors: Other (Comment) (straing,crawling ,house work)  Pain Intervention(s): Medication (See eMAR), Home medication        4A"s of Opioid Risk Assessment  Activity: Patient can perform  ADL  Analgesia:  Patient's pain is  controlled by current medication.   Aberrant Behavior:  reviewed with no aberrant drug seeking/taking behavior    Review of patient's allergies indicates:  No Known Allergies    Current " Medications[1]    HISTORY:    Past Medical History:   Diagnosis Date    Anxiety     Cervical spondylosis without myelopathy     Chronic pain syndrome     Depression     Generalized abdominal pain     High cholesterol     Hypertension         Past Surgical History:   Procedure Laterality Date    APPENDECTOMY          Family History   Problem Relation Name Age of Onset    Cancer Mother      Diabetes Maternal Grandmother          Social History: He  reports that he quit smoking about 51 years ago. His smoking use included cigarettes. He started smoking about 65 years ago. He has a 7 pack-year smoking history. He has been exposed to tobacco smoke. He quit smokeless tobacco use about 22 months ago.  His smokeless tobacco use included snuff and chew. He reports that he does not currently use alcohol. He reports current drug use. Drug: Hydrocodone.    Imaging:  No image results found.       Labs:  Office Visit on 06/11/2025   Component Date Value Ref Range Status    Creatinine, Urine 06/11/2025 66  23 - 375 mg/dL Final    Microalbumin 06/11/2025 <0.5  <=3.0 mg/dL Final    Microalbumin/Creatinine Ratio 06/11/2025    Final    Sodium 06/11/2025 142  136 - 145 mmol/L Final    Potassium 06/11/2025 4.4  3.5 - 5.1 mmol/L Final    Chloride 06/11/2025 103  98 - 107 mmol/L Final    CO2 06/11/2025 33 (H)  23 - 31 mmol/L Final    Anion Gap 06/11/2025 10  7 - 16 mmol/L Final    Glucose 06/11/2025 95  82 - 115 mg/dL Final    BUN 06/11/2025 19  8 - 26 mg/dL Final    Creatinine 06/11/2025 1.08  0.72 - 1.25 mg/dL Final    BUN/Creatinine Ratio 06/11/2025 18  6 - 20 Final    Calcium 06/11/2025 9.1  8.8 - 10.0 mg/dL Final    Total Protein 06/11/2025 6.9  5.8 - 7.6 g/dL Final    Albumin 06/11/2025 4.1  3.4 - 4.8 g/dL Final    Globulin 06/11/2025 2.8  2.0 - 4.0 g/dL Final    A/G Ratio 06/11/2025 1.5   Final    Bilirubin, Total 06/11/2025 0.4  <=1.5 mg/dL Final    Alk Phos 06/11/2025 43  40 - 150 U/L Final    ALT 06/11/2025 13  <=55 U/L Final     AST 06/11/2025 22  11 - 45 U/L Final    eGFR 06/11/2025 74  >=60 mL/min/1.73m2 Final    Triglycerides 06/11/2025 68  34 - 140 mg/dL Final    Cholesterol 06/11/2025 148  <=200 mg/dL Final    HDL Cholesterol 06/11/2025 51  35 - 60 mg/dL Final    Cholesterol/HDL Ratio (Risk Factor) 06/11/2025 2.9   Final    Non-HDL 06/11/2025 97  mg/dL Final    LDL Calculated 06/11/2025 83  mg/dL Final    LDL/HDL 06/11/2025 1.6   Final    VLDL 06/11/2025 14  mg/dL Final    PSA Total 06/11/2025 0.353  <=4.000 ng/mL Final    WBC 06/11/2025 6.63  4.50 - 11.00 K/uL Final    RBC 06/11/2025 4.86  4.60 - 6.20 M/uL Final    Hemoglobin 06/11/2025 14.5  13.5 - 18.0 g/dL Final    Hematocrit 06/11/2025 46.6  40.0 - 54.0 % Final    MCV 06/11/2025 95.9  80.0 - 96.0 fL Final    MCH 06/11/2025 29.8  27.0 - 31.0 pg Final    MCHC 06/11/2025 31.1 (L)  32.0 - 36.0 g/dL Final    RDW 06/11/2025 12.7  11.5 - 14.5 % Final    Platelet Count 06/11/2025 192  150 - 400 K/uL Final    MPV 06/11/2025 11.7  9.4 - 12.4 fL Final    Neutrophils % 06/11/2025 55.3  53.0 - 65.0 % Final    Lymphocytes % 06/11/2025 28.7  27.0 - 41.0 % Final    Monocytes % 06/11/2025 9.0 (H)  2.0 - 6.0 % Final    Eosinophils % 06/11/2025 5.1 (H)  1.0 - 4.0 % Final    Basophils % 06/11/2025 1.4 (H)  0.0 - 1.0 % Final    Immature Granulocytes % 06/11/2025 0.5 (H)  0.0 - 0.4 % Final    nRBC, Auto 06/11/2025 0.0  <=0.0 % Final    Neutrophils, Abs 06/11/2025 3.67  1.80 - 7.70 K/uL Final    Lymphocytes, Absolute 06/11/2025 1.90  1.00 - 4.80 K/uL Final    Monocytes, Absolute 06/11/2025 0.60  0.00 - 0.80 K/uL Final    Eosinophils, Absolute 06/11/2025 0.34  0.00 - 0.50 K/uL Final    Basophils, Absolute 06/11/2025 0.09  0.00 - 0.20 K/uL Final    Immature Granulocytes, Absolute 06/11/2025 0.03  0.00 - 0.04 K/uL Final    nRBC, Absolute 06/11/2025 0.00  <=0.00 x10e3/uL Final    Diff Type 06/11/2025 Auto   Final   Office Visit on 04/29/2025   Component Date Value Ref Range Status    POC Amphetamines  04/29/2025 Negative  Negative, Inconclusive Final    POC Barbiturates 04/29/2025 Negative  Negative, Inconclusive Final    POC Benzodiazepines 04/29/2025 Negative  Negative, Inconclusive Final    POC Cocaine 04/29/2025 Negative  Negative, Inconclusive Final    POC THC 04/29/2025 Negative  Negative, Inconclusive Final    POC Methadone 04/29/2025 Negative  Negative, Inconclusive Final    POC Methamphetamine 04/29/2025 Negative  Negative, Inconclusive Final    POC Opiates 04/29/2025 Presumptive Positive (A)  Negative, Inconclusive Final    POC Oxycodone 04/29/2025 Negative  Negative, Inconclusive Final    POC Phencyclidine 04/29/2025 Negative  Negative, Inconclusive Final    POC Methylenedioxymethamphetamine * 04/29/2025 Negative  Negative, Inconclusive Final    POC Tricyclic Antidepressants 04/29/2025 Negative  Negative, Inconclusive Final    POC Buprenorphine 04/29/2025 Negative   Final     Acceptable 04/29/2025 Yes   Final    POC Temperature (Urine) 04/29/2025 92   Final   Office Visit on 02/03/2025   Component Date Value Ref Range Status    POC Amphetamines 02/03/2025 Negative  Negative, Inconclusive Final    POC Barbiturates 02/03/2025 Negative  Negative, Inconclusive Final    POC Benzodiazepines 02/03/2025 Negative  Negative, Inconclusive Final    POC Cocaine 02/03/2025 Negative  Negative, Inconclusive Final    POC THC 02/03/2025 Negative  Negative, Inconclusive Final    POC Methadone 02/03/2025 Negative  Negative, Inconclusive Final    POC Methamphetamine 02/03/2025 Negative  Negative, Inconclusive Final    POC Opiates 02/03/2025 Presumptive Positive (A)  Negative, Inconclusive Final    POC Oxycodone 02/03/2025 Negative  Negative, Inconclusive Final    POC Phencyclidine 02/03/2025 Negative  Negative, Inconclusive Final    POC Methylenedioxymethamphetamine * 02/03/2025 Negative  Negative, Inconclusive Final    POC Tricyclic Antidepressants 02/03/2025 Negative  Negative, Inconclusive Final    POC  "Buprenorphine 02/03/2025 Negative   Final     Acceptable 02/03/2025 Yes   Final    POC Temperature (Urine) 02/03/2025 92   Final       Vitals:   Vitals:    07/28/25 1043   BP: 131/74   Pulse: 66   Resp: 18   Weight: 100.7 kg (222 lb)   Height: 6' 2" (1.88 m)   PainSc:   7   PainLoc: Abdomen        Review of Systems   Constitutional: Negative.    HENT: Negative.     Eyes: Negative.    Respiratory: Negative.     Cardiovascular: Negative.    Gastrointestinal:  Positive for abdominal distention and abdominal pain.   Endocrine: Negative.    Genitourinary: Negative.    Musculoskeletal: Negative.    Integumentary:  Negative.   Allergic/Immunologic: Negative.    Neurological: Negative.    Hematological: Negative.    Psychiatric/Behavioral: Negative.         PHYSICAL EXAMINATION:  Physical Exam  Vitals and nursing note reviewed.   Constitutional:       General: He is not in acute distress.     Appearance: Normal appearance. He is not ill-appearing, toxic-appearing or diaphoretic.   HENT:      Head: Normocephalic and atraumatic.      Nose: Nose normal.      Mouth/Throat:      Mouth: Mucous membranes are moist.   Eyes:      Extraocular Movements: Extraocular movements intact.      Pupils: Pupils are equal, round, and reactive to light.   Cardiovascular:      Rate and Rhythm: Normal rate and regular rhythm.      Heart sounds: Normal heart sounds.   Pulmonary:      Effort: Pulmonary effort is normal. No respiratory distress.      Breath sounds: Normal breath sounds. No stridor. No wheezing or rhonchi.   Abdominal:      General: Bowel sounds are normal.      Palpations: Abdomen is soft.      Tenderness: There is abdominal tenderness in the right lower quadrant.   Musculoskeletal:         General: No swelling or deformity. Normal range of motion.      Cervical back: Normal and normal range of motion. No spasms or tenderness. No pain with movement. Normal range of motion.      Thoracic back: Normal.      Lumbar back: " No spasms, tenderness or bony tenderness. Normal range of motion. Negative right straight leg raise test and negative left straight leg raise test. No scoliosis.      Right lower leg: No edema.      Left lower leg: No edema.   Skin:     General: Skin is warm.   Neurological:      General: No focal deficit present.      Mental Status: He is alert and oriented to person, place, and time. Mental status is at baseline.      Cranial Nerves: No cranial nerve deficit.      Sensory: Sensation is intact. No sensory deficit.      Motor: No weakness.      Coordination: Coordination normal.      Gait: Gait normal.      Deep Tendon Reflexes: Reflexes are normal and symmetric.   Psychiatric:         Mood and Affect: Mood normal.         Behavior: Behavior normal.            Assessment and Plan:    Encounter Diagnoses   Name Primary?    Encounter for long-term (current) use of medications Yes    Generalized abdominal pain     Chronic pain syndrome          Plan:  1. report:  Reviewed and consistent with medication use as prescribed.    2.Addiction, Dependency, Tolerance, Opioid abuse-misuse, Death, Diversion Discussed. Overdose reversal drug Naloxone discussed. Patient is prescribed opiates for chronic nonmalignant pain pathology.  Patient is receiving opiates which require greater than a 72 hour supply of therapy.  Patient was educated on potential dependency associated with long-term opioid use as well as decreasing efficacy with prolonged use.  Patient was advised of risks, benefits and side effects and how to utilize each medication.  Patient was also informed that any deviation from therapy protocol will  lead to discontinuation of opiates.  It is reasonable to prescribe opioid analgesics for patient based on positive response to opioid medications, lack of side effects and  limited aberrant behavior.      3.Refill/Continue medications for pain control and function       Requested Prescriptions     Signed Prescriptions  Disp Refills    HYDROcodone-acetaminophen (NORCO)  mg per tablet 120 tablet 0     Sig: Take 1 tablet by mouth every 6 (six) hours as needed for Pain (pain).    HYDROcodone-acetaminophen (NORCO)  mg per tablet 120 tablet 0     Sig: Take 1 tablet by mouth every 6 (six) hours as needed for Pain (pain).    HYDROcodone-acetaminophen (NORCO)  mg per tablet 120 tablet 0     Sig: Take 1 tablet by mouth every 6 (six) hours as needed for Pain (pain).     4.Urine drug screen point of care obtained and consistent with prescribed medications and medication refill date.  Drug screen is to monitor compliance with prescribed opiates and to ensure that there was no misuse/abuse of other non-prescribed opioids or illicit drugs.  From this information I will determine if patient is suitable for opioid therapy      Orders Placed This Encounter   Procedures    POCT Urine Drug Screen Presump     Interpretive Information:     Negative:  No drug detected at the cut off level.   Positive:  This result represents presumptive positive for the   tested drug, other substances may yield a positive response other   than the analyte of interest. This result should be utilized for   diagnostic purpose only. Confirmation testing will be performed upon physician request only.         4.Patient defers nerve block injections, physical therapy or surgical consultation    5.Follow with NANDO Null in 3 months for re-evaluation and medication refill      This note was generated with the assistance of ambient listening technology. Verbal consent was obtained by the patient and accompanying visitor(s) for the recording of patient appointment to facilitate this note. I attest to having reviewed and edited the generated note for accuracy, though some syntax or spelling errors may persist. Please contact the author of this note for any clarification.       Date of encounter: 7/28/2025  Michelle Kong MD          [1]   Current Outpatient  Medications:     atenoloL (TENORMIN) 25 MG tablet, Take 1 tablet (25 mg total) by mouth once daily., Disp: 90 tablet, Rfl: 1    cyclobenzaprine (FLEXERIL) 10 MG tablet, Take 1 tablet (10 mg total) by mouth 3 (three) times daily., Disp: 270 tablet, Rfl: 1    EScitalopram oxalate (LEXAPRO) 20 MG tablet, Take 1 tablet (20 mg total) by mouth once daily., Disp: 90 tablet, Rfl: 1    HYDROcodone-acetaminophen (NORCO)  mg per tablet, Take 1 tablet by mouth every 6 (six) hours as needed for Pain (pain)., Disp: 120 tablet, Rfl: 0    losartan-hydrochlorothiazide 50-12.5 mg (HYZAAR) 50-12.5 mg per tablet, Take 1 tablet by mouth once daily., Disp: 90 tablet, Rfl: 1    lovastatin (MEVACOR) 40 MG tablet, Take 1 tablet (40 mg total) by mouth nightly., Disp: 90 tablet, Rfl: 1    traZODone (DESYREL) 150 MG tablet, Take 1 tablet (150 mg total) by mouth every evening., Disp: 90 tablet, Rfl: 1    [START ON 8/26/2025] HYDROcodone-acetaminophen (NORCO)  mg per tablet, Take 1 tablet by mouth every 6 (six) hours as needed for Pain (pain)., Disp: 120 tablet, Rfl: 0    [START ON 9/25/2025] HYDROcodone-acetaminophen (NORCO)  mg per tablet, Take 1 tablet by mouth every 6 (six) hours as needed for Pain (pain)., Disp: 120 tablet, Rfl: 0    [START ON 10/24/2025] HYDROcodone-acetaminophen (NORCO)  mg per tablet, Take 1 tablet by mouth every 6 (six) hours as needed for Pain (pain)., Disp: 120 tablet, Rfl: 0